# Patient Record
Sex: FEMALE | Race: WHITE | NOT HISPANIC OR LATINO | Employment: OTHER | URBAN - METROPOLITAN AREA
[De-identification: names, ages, dates, MRNs, and addresses within clinical notes are randomized per-mention and may not be internally consistent; named-entity substitution may affect disease eponyms.]

---

## 2017-03-25 DIAGNOSIS — M85.80 OSTEOPENIA: ICD-10-CM

## 2017-03-27 RX ORDER — ALENDRONATE SODIUM 70 MG/1
70 TABLET ORAL
Qty: 12 TABLET | Refills: 0 | Status: SHIPPED | OUTPATIENT
Start: 2017-03-27 | End: 2017-03-27

## 2017-03-27 RX ORDER — ALENDRONATE SODIUM 70 MG/1
70 TABLET ORAL
Qty: 12 TABLET | Refills: 0 | Status: SHIPPED | OUTPATIENT
Start: 2017-03-27 | End: 2017-05-12

## 2017-03-27 NOTE — TELEPHONE ENCOUNTER
alendronate (FOSAMAX) 70 MG       Last Written Prescription Date:  6/6/16  Last Fill Quantity: 12,   # refills: 3  Last Office Visit : 9/15/16  Future Office visit:  none  Creatinine   Date Value Ref Range Status   06/08/2016 0.58 0.52 - 1.04 mg/dL Final

## 2017-05-12 DIAGNOSIS — I10 ESSENTIAL HYPERTENSION WITH GOAL BLOOD PRESSURE LESS THAN 140/90: ICD-10-CM

## 2017-05-12 DIAGNOSIS — M85.80 OSTEOPENIA: ICD-10-CM

## 2017-05-15 RX ORDER — ALENDRONATE SODIUM 70 MG/1
70 TABLET ORAL
Qty: 4 TABLET | Refills: 0 | Status: SHIPPED | OUTPATIENT
Start: 2017-05-15 | End: 2017-05-25

## 2017-05-15 RX ORDER — LOSARTAN POTASSIUM 100 MG/1
100 TABLET ORAL DAILY
Qty: 30 TABLET | Refills: 0 | Status: SHIPPED | OUTPATIENT
Start: 2017-05-15 | End: 2017-05-25

## 2017-05-15 RX ORDER — HYDROCHLOROTHIAZIDE 25 MG/1
25 TABLET ORAL DAILY
Qty: 30 TABLET | Refills: 0 | Status: SHIPPED | OUTPATIENT
Start: 2017-05-15 | End: 2017-05-25

## 2017-05-15 NOTE — TELEPHONE ENCOUNTER
atorvastatin     Last Written Prescription Date:  5/5/16  Last Fill Quantity: 90,   # refills: 3  Last Office Visit : 9/15/16  Future Office visit:  NONE  Routing refill request to provider for review/approval because:  Drug not active on patient's medication list  STOPPED BY PT  6/6/16     losartan       Last Written Prescription Date: 6/6/16  Last Fill Quantity: 90, # refills: 3  Potassium   Date Value Ref Range Status   06/08/2016 3.5 3.4 - 5.3 mmol/L Final     Creatinine   Date Value Ref Range Status   06/08/2016 0.58 0.52 - 1.04 mg/dL Final     BP Readings from Last 3 Encounters:   09/15/16 174/80   09/05/16 156/84   06/06/16 157/79   *LABS DUE 1 MOS RF     HCTZ      Last Written Prescription Date:  6/6/16  Last Fill Quantity: 90,   # refills: 3  K CR & BPs   Results for MAGALYS REGALADO (MRN [id number]) as of 5/15/2017 16:32   Ref. Range 6/8/2016 11:58   Sodium Latest Ref Range: 133 - 144 mmol/L 134   *LABS DUE 1 MOS RF     alendronate       Last Written Prescription Date:  3/27/17   Last Fill Quantity: 12,   # refills: 0  CR  *LABS DUE 1 MOS RF

## 2017-05-18 RX ORDER — ATORVASTATIN CALCIUM 20 MG/1
20 TABLET, FILM COATED ORAL DAILY
Qty: 90 TABLET | Refills: 3 | Status: SHIPPED | OUTPATIENT
Start: 2017-05-18 | End: 2018-05-14

## 2017-05-22 ASSESSMENT — ENCOUNTER SYMPTOMS
NECK PAIN: 0
MUSCLE CRAMPS: 0
MYALGIAS: 0
MUSCLE WEAKNESS: 0
ARTHRALGIAS: 0
JOINT SWELLING: 0
STIFFNESS: 0
BACK PAIN: 1

## 2017-05-22 ASSESSMENT — ACTIVITIES OF DAILY LIVING (ADL)
ARE_THERE_FIREARMS_IN_YOUR_HOME?: N
ARE_THERE_SMOKE_DETECTORS_IN_YOUR_HOME?: Y
DO_MEMBERS_OF_YOUR_HOUSEHOLD_WEAR_SEAT_BELTS?: Y
DO_MEMBERS_OF_YOUR_HOUSEHOLD_USE_SAFETY_HELMETS?: Y
ARE_THERE_CARBON_MONOXIDE_DETECTORS_IN_YOUR_HOME?: Y
DO_MEMBERS_OF_YOUR_HOUSEHOLD_USE_SUNSCREEN?: Y

## 2017-05-25 ENCOUNTER — OFFICE VISIT (OUTPATIENT)
Dept: INTERNAL MEDICINE | Facility: CLINIC | Age: 77
End: 2017-05-25

## 2017-05-25 VITALS
SYSTOLIC BLOOD PRESSURE: 137 MMHG | HEART RATE: 57 BPM | DIASTOLIC BLOOD PRESSURE: 75 MMHG | OXYGEN SATURATION: 96 % | RESPIRATION RATE: 18 BRPM | WEIGHT: 139.9 LBS | BODY MASS INDEX: 25.59 KG/M2

## 2017-05-25 DIAGNOSIS — Z23 PNEUMOCOCCAL VACCINATION ADMINISTERED AT CURRENT VISIT: Primary | ICD-10-CM

## 2017-05-25 DIAGNOSIS — I10 ESSENTIAL HYPERTENSION WITH GOAL BLOOD PRESSURE LESS THAN 140/90: ICD-10-CM

## 2017-05-25 DIAGNOSIS — I10 ESSENTIAL HYPERTENSION: ICD-10-CM

## 2017-05-25 DIAGNOSIS — M85.80 OSTEOPENIA: ICD-10-CM

## 2017-05-25 LAB
ALBUMIN SERPL-MCNC: 4.4 G/DL (ref 3.4–5)
ALP SERPL-CCNC: 52 U/L (ref 40–150)
ALT SERPL W P-5'-P-CCNC: 29 U/L (ref 0–50)
ANION GAP SERPL CALCULATED.3IONS-SCNC: 10 MMOL/L (ref 3–14)
AST SERPL W P-5'-P-CCNC: 24 U/L (ref 0–45)
BILIRUB SERPL-MCNC: 0.7 MG/DL (ref 0.2–1.3)
BUN SERPL-MCNC: 8 MG/DL (ref 7–30)
CALCIUM SERPL-MCNC: 9.2 MG/DL (ref 8.5–10.1)
CHLORIDE SERPL-SCNC: 100 MMOL/L (ref 94–109)
CHOLEST SERPL-MCNC: 211 MG/DL
CO2 SERPL-SCNC: 26 MMOL/L (ref 20–32)
CREAT SERPL-MCNC: 0.46 MG/DL (ref 0.52–1.04)
DEPRECATED CALCIDIOL+CALCIFEROL SERPL-MC: 40 UG/L (ref 20–75)
GFR SERPL CREATININE-BSD FRML MDRD: >90 ML/MIN/1.7M2
GLUCOSE SERPL-MCNC: 94 MG/DL (ref 70–99)
HDLC SERPL-MCNC: 79 MG/DL
LDLC SERPL CALC-MCNC: 111 MG/DL
NONHDLC SERPL-MCNC: 132 MG/DL
POTASSIUM SERPL-SCNC: 3.4 MMOL/L (ref 3.4–5.3)
PROT SERPL-MCNC: 8 G/DL (ref 6.8–8.8)
SODIUM SERPL-SCNC: 136 MMOL/L (ref 133–144)
TRIGL SERPL-MCNC: 105 MG/DL

## 2017-05-25 RX ORDER — LOSARTAN POTASSIUM 100 MG/1
100 TABLET ORAL DAILY
Qty: 90 TABLET | Refills: 3 | Status: SHIPPED | OUTPATIENT
Start: 2017-05-25 | End: 2017-06-28

## 2017-05-25 RX ORDER — HYDROCHLOROTHIAZIDE 25 MG/1
25 TABLET ORAL DAILY
Qty: 90 TABLET | Refills: 3 | Status: SHIPPED | OUTPATIENT
Start: 2017-05-25 | End: 2017-06-28

## 2017-05-25 RX ORDER — ALENDRONATE SODIUM 70 MG/1
70 TABLET ORAL
Qty: 12 TABLET | Refills: 3 | Status: SHIPPED | OUTPATIENT
Start: 2017-05-25 | End: 2017-06-21

## 2017-05-25 RX ORDER — AMLODIPINE BESYLATE 10 MG/1
10 TABLET ORAL DAILY
Qty: 90 TABLET | Refills: 3 | Status: SHIPPED | OUTPATIENT
Start: 2017-05-25 | End: 2017-09-20

## 2017-05-25 ASSESSMENT — ENCOUNTER SYMPTOMS
VOMITING: 0
BRUISES/BLEEDS EASILY: 0
COUGH DISTURBING SLEEP: 0
CONSTIPATION: 0
SEIZURES: 0
BREAST MASS: 0
HYPERTENSION: 0
DISTURBANCES IN COORDINATION: 0
DECREASED LIBIDO: 0
MEMORY LOSS: 0
DYSPNEA ON EXERTION: 0
DIFFICULTY URINATING: 0
HOARSE VOICE: 0
EYE REDNESS: 0
HOT FLASHES: 0
LIGHT-HEADEDNESS: 0
JOINT SWELLING: 0
DECREASED APPETITE: 0
WEAKNESS: 0
HEMATURIA: 0
SLEEP DISTURBANCES DUE TO BREATHING: 0
DIZZINESS: 0
EYE IRRITATION: 0
TREMORS: 0
RESPIRATORY PAIN: 0
BREAST PAIN: 0
POLYDIPSIA: 0
FLANK PAIN: 0
WEIGHT LOSS: 0
HEMOPTYSIS: 0
PANIC: 0
FEVER: 0
DECREASED CONCENTRATION: 0
WEIGHT GAIN: 0
LEG PAIN: 0
INSOMNIA: 0
DOUBLE VISION: 0
JAUNDICE: 0
ORTHOPNEA: 0
NAIL CHANGES: 0
BOWEL INCONTINENCE: 0
SHORTNESS OF BREATH: 0
COUGH: 0
ARTHRALGIAS: 0
TINGLING: 0
PALPITATIONS: 0
SWOLLEN GLANDS: 0
POLYPHAGIA: 0
SINUS CONGESTION: 0
HEADACHES: 0
HYPOTENSION: 0
CHILLS: 0
STIFFNESS: 0
ABDOMINAL PAIN: 0
TACHYCARDIA: 0
BACK PAIN: 1
SINUS PAIN: 0
CLAUDICATION: 0
EXERCISE INTOLERANCE: 0
MYALGIAS: 0
RECTAL BLEEDING: 0
DEPRESSION: 0
NECK PAIN: 0
SORE THROAT: 0
NERVOUS/ANXIOUS: 0
SPEECH CHANGE: 0
WHEEZING: 0
ALTERED TEMPERATURE REGULATION: 0
RECTAL PAIN: 0
SNORES LOUDLY: 0
SMELL DISTURBANCE: 0
BLOOD IN STOOL: 0
HALLUCINATIONS: 0
EXTREMITY NUMBNESS: 0
PARALYSIS: 0
HEARTBURN: 0
SPUTUM PRODUCTION: 0
LOSS OF CONSCIOUSNESS: 0
EYE WATERING: 0
INCREASED ENERGY: 0
NIGHT SWEATS: 0
MUSCLE WEAKNESS: 0
SYNCOPE: 0
NUMBNESS: 0
DIARRHEA: 0
LEG SWELLING: 0
POOR WOUND HEALING: 0
TASTE DISTURBANCE: 0
MUSCLE CRAMPS: 0
SKIN CHANGES: 0
FATIGUE: 0
DYSURIA: 0
NAUSEA: 0
EYE PAIN: 0
NECK MASS: 0
POSTURAL DYSPNEA: 0
TROUBLE SWALLOWING: 0
BLOATING: 0

## 2017-05-25 ASSESSMENT — PAIN SCALES - GENERAL: PAINLEVEL: NO PAIN (0)

## 2017-05-25 NOTE — PROGRESS NOTES
CC: Annual physical exam    HPI:    Janette Rahman is here for a routine physical.  She is feeling well.    HTN: BP is diong good.  120's at home.  No lightheadedness or dizziness.  Small amount of LE edema, but not significant    Back pain: actually having hip pain (just couldn't find a spot to put that ni the questionnaire).  Did PT which helped.  Also sees chiropracter who's her grandson    Gyne:  No spotting    Depression screen:  PHQ-2 Score:     No flowsheet data found.    Tobacco screen:  History   Smoking Status     Never Smoker   Smokeless Tobacco     Never Used     Obesity screen:  Body mass index is 25.59 kg/(m^2).  Exercising regularly    Review of Systems     Constitutional:  Negative for fever, chills, weight loss, weight gain, fatigue, decreased appetite, night sweats, recent stressors, height gain, height loss, post-operative complications, incisional pain, hallucinations, increased energy, hyperactivity and confused.   HENT:  Negative for ear pain, hearing loss, tinnitus, nosebleeds, trouble swallowing, hoarse voice, mouth sores, sore throat, ear discharge, tooth pain, gum tenderness, taste disturbance, smell disturbance, hearing aid, bleeding gums, dry mouth, sinus pain, sinus congestion and neck mass.    Eyes:  Negative for double vision, pain, redness, eye pain, decreased vision, eye watering, eye bulging, eye dryness, flashing lights, spots, floaters, strabismus, tunnel vision, jaundice and eye irritation.   Respiratory:   Negative for cough, hemoptysis, sputum production, shortness of breath, wheezing, sleep disturbances due to breathing, snores loudly, respiratory pain, dyspnea on exertion, cough disturbing sleep and postural dyspnea.    Cardiovascular:  Negative for chest pain, dyspnea on exertion, palpitations, orthopnea, claudication, leg swelling, fingers/toes turn blue, hypertension, hypotension, syncope, history of heart murmur, chest pain on exertion, chest pain at rest, pacemaker,  few scattered varicosities, leg pain, sleep disturbances due to breathing, tachycardia, light-headedness, exercise intolerance and edema.   Gastrointestinal:  Negative for heartburn, nausea, vomiting, abdominal pain, diarrhea, constipation, blood in stool, melena, rectal pain, bloating, hemorrhoids, bowel incontinence, jaundice, rectal bleeding, coffee ground emesis and change in stool.   Genitourinary:  Negative for bladder incontinence, dysuria, urgency, hematuria, flank pain, vaginal discharge, difficulty urinating, genital sores, dyspareunia, decreased libido, nocturia, voiding less frequently, arousal difficulty, abnormal vaginal bleeding, excessive menstruation, menstrual changes, hot flashes, vaginal dryness and postmenopausal bleeding.   Musculoskeletal:  Positive for back pain. Negative for myalgias, joint swelling, arthralgias, stiffness, muscle cramps, neck pain, bone pain, muscle weakness and fracture.   Skin:  Negative for nail changes, itching, poor wound healing, rash, hair changes, skin changes, acne, warts, poor wound healing, scarring, flaky skin, Raynaud's phenomenon, sensitivity to sunlight and skin thickening.   Neurological:  Negative for dizziness, tingling, tremors, speech change, seizures, loss of consciousness, weakness, light-headedness, numbness, headaches, disturbances in coordination, extremity numbness, memory loss, difficulty walking and paralysis.   Endo/Heme:  Negative for anemia, swollen glands and bruises/bleeds easily.   Psychiatric/Behavioral:  Negative for depression, hallucinations, memory loss, decreased concentration, mood swings and panic attacks.    Breast:  Negative for breast discharge, breast mass, breast pain and nipple retraction.   Endocrine:  Negative for altered temperature regulation, polyphagia, polydipsia, unwanted hair growth and change in facial hair.      Medications, allergies, family history, and social history were reviewed and updated in the chart    Past  medical history was reviewed and updated  Patient Active Problem List   Diagnosis     Essential hypertension, benign     HLD (hyperlipidemia)     Inflamed seborrheic keratosis     Osteopenia       OBJECTIVE:  Physical Exam:  /75  Pulse 57  Resp 18  Wt 63.5 kg (139 lb 14.4 oz)  SpO2 96%  Breastfeeding? No  BMI 25.59 kg/m2    GENERAL APPEARANCE: healthy, alert and no distress     EYES: EOMI,     HENT:  mouth without ulcers or lesions     NECK: no adenopathy, no asymmetry, masses, or scars and thyroid normal to palpation     RESP: lungs clear to auscultation - no rales, rhonchi or wheezes     CV: regular rates and rhythm, normal S1 S2, no S3 or S4 and no murmur, click or rub -     ABDOMEN:  soft, nontender, no HSM or masses and bowel sounds normal     MS: extremities normal- no gross deformities noted,    SKIN: no suspicious lesions or rashes     NEURO: mentation intact and speech normal     PSYCH: mentation appears normal. and affect normal/bright     LYMPHATICS: No cervical,  or supraclavicular nodes      ASSESSMENT/PLAN:  # Preventive Care Visit:     Hyperlipidemia:   Have not rechecked lipids since starting lipitor.  WIll check today    Essential hypertension with goal blood pressure less than 140/90  At goal  - losartan (COZAAR) 100 MG tablet  Dispense: 90 tablet; Refill: 3  - hydrochlorothiazide (HYDRODIURIL) 25 MG tablet  Dispense: 90 tablet; Refill: 3  - amLODIPine (NORVASC) 10 MG tablet  Dispense: 90 tablet; Refill: 3    Osteopenia  No fractures  - alendronate (FOSAMAX) 70 MG tablet  Dispense: 12 tablet; Refill: 3  - Vitamin D Deficiency  - Comprehensive metabolic panel    Pneumococcal vaccination administered at current visit  - Pneumococcal vaccine 13 valent PCV13 IM (Prevnar) [00016]    RTC: 1 year      Alyssa Lopez MD

## 2017-05-25 NOTE — MR AVS SNAPSHOT
After Visit Summary   5/25/2017    Janette Rahman    MRN: [id number]           Patient Information     Date Of Birth          1940        Visit Information        Provider Department      5/25/2017 1:30 PM Alyssa Lopez MD [facility]        Today's Diagnoses     Pneumococcal vaccination administered at current visit    -  1    Essential hypertension with goal blood pressure less than 140/90        Essential hypertension        Osteopenia          Care Instructions    Primary Care Center Phone Number [phone]  Primary Care Center Medication Refill Request Information:  * Please contact your pharmacy regarding ANY request for medication refills.  ** [facility] Prescription Fax = [phone]  * Please allow 3 business days for routine medication refills.  * Please allow 5 business days for controlled substance medication refills.     Primary Care Center Test Result notification information:  *You will be notified with in 7-10 days of your appointment day regarding the results of your test.  If you are on MyChart you will be notified as soon as the provider has reviewed the results and signed off on them.                  Follow-ups after your visit        Your next 10 appointments already scheduled     May 25, 2017  1:30 PM CDT   (Arrive by 1:15 PM)   PHYSICAL with Alyssa Lopez MD   [facility] ([facility])    [address] [place] [address] [phone]              Future tests that were ordered for you today     Open Future Orders        Priority Expected Expires Ordered    Lipid Profile Routine 5/25/2017 5/25/2018 5/25/2017    Basic metabolic panel Routine 5/25/2017 6/8/2017 5/25/2017            Who to contact     Please call your clinic at [phone] to:    Ask questions about your health    Make or cancel appointments    Discuss your medicines    Learn about your test  results    Speak to your doctor   If you have compliments or concerns about an experience at your clinic, or if you wish to file a complaint, please contact [facility] Patient Relations at [phone] or email us at [email or web address]         Additional Information About Your Visit        Viewdlehart Information     Movablet gives you secure access to your electronic health record. If you see a primary care provider, you can also send messages to your care team and make appointments. If you have questions, please call your primary care clinic.  If you do not have a primary care provider, please call [phone] and they will assist you.      [employer] is an electronic gateway that provides easy, online access to your medical records. With [employer], you can request a clinic appointment, read your test results, renew a prescription or communicate with your care team.     To access your existing account, please contact your [facility] or call [phone] for assistance.        Care EveryWhere ID     This is your Care EveryWhere ID. This could be used by other organizations to access your [facility] medical records  [id number]        Your Vitals Were     Pulse Respirations Pulse Oximetry Breastfeeding? BMI (Body Mass Index)       57 18 96% No 25.59 kg/m2        Blood Pressure from Last 3 Encounters:   05/25/17 137/75   09/15/16 174/80   09/05/16 156/84    Weight from Last 3 Encounters:   05/25/17 63.5 kg (139 lb 14.4 oz)   09/15/16 61 kg (134 lb 8 oz)   09/05/16 60.7 kg (133 lb 12.8 oz)              We Performed the Following     Pneumococcal vaccine 13 valent PCV13 IM (Prevnar) [13341]          Today's Medication Changes          These changes are accurate as of: 5/25/17  1:02 PM.  If you have any questions, ask your nurse or doctor.               These medicines have changed or have updated prescriptions.        Dose/Directions    alendronate 70 MG  tablet   Commonly known as:  FOSAMAX   This may have changed:  additional instructions   Used for:  Osteopenia   Changed by:  Alyssa Lopez MD        Dose:  70 mg   Take 1 tablet (70 mg) by mouth every 7 days   Quantity:  12 tablet   Refills:  3            Where to get your medicines      These medications were sent to [facility] [place] [address] [place] [address]  [address] [place] [address], [place] [address]     Phone:  [phone]     alendronate 70 MG tablet    amLODIPine 10 MG tablet    hydrochlorothiazide 25 MG tablet    losartan 100 MG tablet                Primary Care Provider Office Phone # Fax #    Alsysa Lopez [phone]       [address] [place] [address]        Thank you!     Thank you for choosing [facility]  for your care. Our goal is always to provide you with excellent care. Hearing back from our patients is one way we can continue to improve our services. Please take a few minutes to complete the written survey that you may receive in the mail after your visit with us. Thank you!             Your Updated Medication List - Protect others around you: Learn how to safely use, store and throw away your medicines at www.disposemymeds.org.          This list is accurate as of: 5/25/17  1:02 PM.  Always use your most recent med list.                   Brand Name Dispense Instructions for use    alendronate 70 MG tablet    FOSAMAX    12 tablet    Take 1 tablet (70 mg) by mouth every 7 days       amLODIPine 10 MG tablet    NORVASC    90 tablet    Take 1 tablet (10 mg) by mouth daily       atorvastatin 20 MG tablet    LIPITOR    90 tablet    Take 1 tablet (20 mg) by mouth daily       fluticasone 50 MCG/ACT spray    FLONASE    3 Bottle    Spray 1-2 sprays into both nostrils daily       hydrochlorothiazide 25 MG tablet    HYDRODIURIL    90 tablet    Take 1 tablet (25 mg)  by mouth daily LABS DUE       losartan 100 MG tablet    COZAAR    90 tablet    Take 1 tablet (100 mg) by mouth daily LABS DUE       meclizine 25 MG tablet    ANTIVERT    30 tablet    Take 1 tablet (25 mg) by mouth every 6 hours as needed for dizziness

## 2017-05-25 NOTE — NURSING NOTE
Chief Complaint   Patient presents with     Physical     pt is here for an annual physical       Venessa Ray CMA at 12:42 PM on 5/25/2017

## 2017-05-25 NOTE — PATIENT INSTRUCTIONS
Primary Care Center Phone Number [phone]  Primary Care Center Medication Refill Request Information:  * Please contact your pharmacy regarding ANY request for medication refills.  ** [facility] Prescription Fax = [phone]  * Please allow 3 business days for routine medication refills.  * Please allow 5 business days for controlled substance medication refills.     Primary Care Center Test Result notification information:  *You will be notified with in 7-10 days of your appointment day regarding the results of your test.  If you are on MyChart you will be notified as soon as the provider has reviewed the results and signed off on them.

## 2017-06-15 DIAGNOSIS — M85.80 OSTEOPENIA: ICD-10-CM

## 2017-06-19 RX ORDER — ALENDRONATE SODIUM 70 MG/1
TABLET ORAL
Qty: 4 TABLET | Refills: 0 | OUTPATIENT
Start: 2017-06-19

## 2017-06-19 NOTE — TELEPHONE ENCOUNTER
Call was placed to patient to clarify where to refill her Fosamax as there was a refill request from a different pharmacy than the last order was sent to . The patient states the is no longer taking the medication.  Pharmacy was notified.  It will be removed from the medication list and an FYI will be sent to the provider.    Kathleen M Doege RN

## 2017-06-20 NOTE — TELEPHONE ENCOUNTER
At my recent visit with her she was taking this medication list -- can you clarify why she stopped?    June 21, 2017 10:39 AM  Spoke with patient. She had confused Fosamax and Flonase. She does take the Fosamax. Prescription sent to her mail order pharmacy.   Marlen Rashid RN, Care Coordinator

## 2017-06-21 RX ORDER — ALENDRONATE SODIUM 70 MG/1
70 TABLET ORAL
Qty: 12 TABLET | Refills: 3 | Status: SHIPPED | OUTPATIENT
Start: 2017-06-21 | End: 2018-06-08

## 2017-06-28 DIAGNOSIS — I10 ESSENTIAL HYPERTENSION WITH GOAL BLOOD PRESSURE LESS THAN 140/90: ICD-10-CM

## 2017-06-29 RX ORDER — LOSARTAN POTASSIUM 100 MG/1
100 TABLET ORAL DAILY
Qty: 90 TABLET | Refills: 2 | Status: SHIPPED | OUTPATIENT
Start: 2017-06-29 | End: 2018-03-11

## 2017-06-29 RX ORDER — HYDROCHLOROTHIAZIDE 25 MG/1
25 TABLET ORAL DAILY
Qty: 90 TABLET | Refills: 2 | Status: SHIPPED | OUTPATIENT
Start: 2017-06-29 | End: 2018-03-11

## 2017-09-20 DIAGNOSIS — I10 ESSENTIAL HYPERTENSION: ICD-10-CM

## 2017-09-22 RX ORDER — AMLODIPINE BESYLATE 10 MG/1
10 TABLET ORAL DAILY
Qty: 90 TABLET | Refills: 1 | Status: SHIPPED | OUTPATIENT
Start: 2017-09-22 | End: 2018-03-11

## 2018-02-19 DIAGNOSIS — M85.80 OSTEOPENIA: ICD-10-CM

## 2018-02-19 DIAGNOSIS — I10 ESSENTIAL HYPERTENSION WITH GOAL BLOOD PRESSURE LESS THAN 140/90: ICD-10-CM

## 2018-02-19 RX ORDER — ATORVASTATIN CALCIUM 20 MG/1
TABLET, FILM COATED ORAL
Qty: 90 TABLET | Refills: 3 | OUTPATIENT
Start: 2018-02-19

## 2018-03-11 DIAGNOSIS — I10 ESSENTIAL HYPERTENSION: ICD-10-CM

## 2018-03-11 DIAGNOSIS — I10 ESSENTIAL HYPERTENSION WITH GOAL BLOOD PRESSURE LESS THAN 140/90: ICD-10-CM

## 2018-03-13 NOTE — TELEPHONE ENCOUNTER
amLODIPine (NORVASC) 10 MG  Last Written Prescription Date:  9/22/17  Last Fill Quantity: 90,   # refills: 1  Last Office Visit : 5/25/17  Future Office visit:  NONE    Routing refill request for review/approval because:  REVIEW LABS    Y DAY

## 2018-03-13 NOTE — TELEPHONE ENCOUNTER
HYDRODIURIL  25MG     Last Written Prescription Date:  6/29/17  Last Fill Quantity: 90,   # refills: 2  Last Office Visit : 5/25/17  Future Office visit:  NONE    losartan (COZAAR) 100 MG   Last Written Prescription Date:  6/29/17  Last Fill Quantity: 90,   # refills: 2     **Routing refill request to provider for review/approval because:  REVIEW LAB/ CR

## 2018-03-14 RX ORDER — LOSARTAN POTASSIUM 100 MG/1
100 TABLET ORAL DAILY
Qty: 90 TABLET | Refills: 0 | Status: SHIPPED | OUTPATIENT
Start: 2018-03-14 | End: 2018-05-14

## 2018-03-14 RX ORDER — HYDROCHLOROTHIAZIDE 25 MG/1
25 TABLET ORAL DAILY
Qty: 90 TABLET | Refills: 0 | Status: SHIPPED | OUTPATIENT
Start: 2018-03-14 | End: 2018-05-14

## 2018-03-14 RX ORDER — AMLODIPINE BESYLATE 10 MG/1
10 TABLET ORAL DAILY
Qty: 90 TABLET | Refills: 0 | Status: SHIPPED | OUTPATIENT
Start: 2018-03-14 | End: 2018-05-14

## 2018-03-14 NOTE — TELEPHONE ENCOUNTER
BP Readings from Last 3 Encounters:   05/25/17 137/75   09/15/16 174/80   09/05/16 156/84     Creatinine   Date Value Ref Range Status   05/25/2017 0.46 (L) 0.52 - 1.04 mg/dL Final     Potassium   Date Value Ref Range Status   05/25/2017 3.4 3.4 - 5.3 mmol/L Final

## 2018-03-24 DIAGNOSIS — M85.80 OSTEOPENIA: ICD-10-CM

## 2018-03-26 RX ORDER — ALENDRONATE SODIUM 70 MG/1
TABLET ORAL
Qty: 12 TABLET | Refills: 3 | OUTPATIENT
Start: 2018-03-26

## 2018-05-14 DIAGNOSIS — I10 ESSENTIAL HYPERTENSION: ICD-10-CM

## 2018-05-14 DIAGNOSIS — I10 ESSENTIAL HYPERTENSION WITH GOAL BLOOD PRESSURE LESS THAN 140/90: ICD-10-CM

## 2018-05-14 RX ORDER — ATORVASTATIN CALCIUM 20 MG/1
20 TABLET, FILM COATED ORAL DAILY
Qty: 90 TABLET | Refills: 0 | Status: SHIPPED | OUTPATIENT
Start: 2018-05-14 | End: 2018-06-08

## 2018-05-14 NOTE — TELEPHONE ENCOUNTER
Hctz,cozaar,norvasc   Last Written Prescription Date:  All filled 3/14/18  Last Fill Quantity: 90,   # refills: 0  Last Office Visit : 5/25/17  Future Office visit:  No future appt    Routing refill request to nurse for review/approval because:  Failed protocol  Scheduling has been notified to contact the pt for appointment.

## 2018-05-17 DIAGNOSIS — I10 ESSENTIAL HYPERTENSION WITH GOAL BLOOD PRESSURE LESS THAN 140/90: ICD-10-CM

## 2018-05-17 DIAGNOSIS — I10 ESSENTIAL HYPERTENSION: ICD-10-CM

## 2018-05-17 RX ORDER — AMLODIPINE BESYLATE 10 MG/1
10 TABLET ORAL DAILY
Qty: 90 TABLET | Refills: 0 | Status: SHIPPED | OUTPATIENT
Start: 2018-05-17 | End: 2018-06-08

## 2018-05-17 RX ORDER — LOSARTAN POTASSIUM 100 MG/1
100 TABLET ORAL DAILY
Qty: 90 TABLET | Refills: 0 | Status: SHIPPED | OUTPATIENT
Start: 2018-05-17 | End: 2018-06-08

## 2018-05-17 RX ORDER — HYDROCHLOROTHIAZIDE 25 MG/1
25 TABLET ORAL DAILY
Qty: 90 TABLET | Refills: 0 | Status: SHIPPED | OUTPATIENT
Start: 2018-05-17 | End: 2018-06-08

## 2018-05-21 RX ORDER — HYDROCHLOROTHIAZIDE 25 MG/1
TABLET ORAL
Qty: 90 TABLET | Refills: 0 | OUTPATIENT
Start: 2018-05-21

## 2018-05-21 RX ORDER — AMLODIPINE BESYLATE 10 MG/1
TABLET ORAL
Qty: 90 TABLET | Refills: 0 | OUTPATIENT
Start: 2018-05-21

## 2018-05-21 RX ORDER — LOSARTAN POTASSIUM 100 MG/1
TABLET ORAL
Qty: 90 TABLET | Refills: 0 | OUTPATIENT
Start: 2018-05-21

## 2018-05-26 ASSESSMENT — ACTIVITIES OF DAILY LIVING (ADL)
IN_THE_PAST_7_DAYS,_DID_YOU_NEED_HELP_FROM_OTHERS_TO_TAKE_CARE_OF_THINGS_SUCH_AS_LAUNDRY_AND_HOUSEKEEPING,_BANKING,_SHOPPING,_USING_THE_TELEPHONE,_FOOD_PREPARATION,_TRANSPORTATION,_OR_TAKING_YOUR_OWN_MEDICATIONS?: N
IN_THE_PAST_7_DAYS,_DID_YOU_NEED_HELP_FROM_OTHERS_TO_PERFORM_EVERYDAY_ACTIVITIES_SUCH_AS_EATING,_GETTING_DRESSED,_GROOMING,_BATHING,_WALKING,_OR_USING_THE_TOILET: N

## 2018-05-26 ASSESSMENT — ENCOUNTER SYMPTOMS
MUSCLE CRAMPS: 0
BACK PAIN: 0
NECK PAIN: 1
ARTHRALGIAS: 1
MUSCLE WEAKNESS: 0
MYALGIAS: 0
JOINT SWELLING: 1
STIFFNESS: 1

## 2018-06-08 ENCOUNTER — OFFICE VISIT (OUTPATIENT)
Dept: INTERNAL MEDICINE | Facility: CLINIC | Age: 78
End: 2018-06-08
Payer: COMMERCIAL

## 2018-06-08 ENCOUNTER — RADIANT APPOINTMENT (OUTPATIENT)
Dept: MAMMOGRAPHY | Facility: CLINIC | Age: 78
End: 2018-06-08
Attending: INTERNAL MEDICINE
Payer: COMMERCIAL

## 2018-06-08 VITALS
BODY MASS INDEX: 26.39 KG/M2 | OXYGEN SATURATION: 98 % | DIASTOLIC BLOOD PRESSURE: 66 MMHG | HEART RATE: 57 BPM | SYSTOLIC BLOOD PRESSURE: 115 MMHG | HEIGHT: 61 IN | WEIGHT: 139.8 LBS

## 2018-06-08 DIAGNOSIS — M85.80 OSTEOPENIA, UNSPECIFIED LOCATION: ICD-10-CM

## 2018-06-08 DIAGNOSIS — Z12.31 ENCOUNTER FOR SCREENING MAMMOGRAM FOR BREAST CANCER: ICD-10-CM

## 2018-06-08 DIAGNOSIS — Z23 NEED FOR PROPHYLACTIC VACCINATION WITH TETANUS-DIPHTHERIA (TD): Primary | ICD-10-CM

## 2018-06-08 DIAGNOSIS — I10 ESSENTIAL HYPERTENSION WITH GOAL BLOOD PRESSURE LESS THAN 140/90: ICD-10-CM

## 2018-06-08 DIAGNOSIS — I10 ESSENTIAL HYPERTENSION: ICD-10-CM

## 2018-06-08 DIAGNOSIS — E78.5 HYPERLIPIDEMIA LDL GOAL <100: ICD-10-CM

## 2018-06-08 LAB
ANION GAP SERPL CALCULATED.3IONS-SCNC: 8 MMOL/L (ref 3–14)
BUN SERPL-MCNC: 7 MG/DL (ref 7–30)
CALCIUM SERPL-MCNC: 9.1 MG/DL (ref 8.5–10.1)
CHLORIDE SERPL-SCNC: 99 MMOL/L (ref 94–109)
CHOLEST SERPL-MCNC: 201 MG/DL
CO2 SERPL-SCNC: 28 MMOL/L (ref 20–32)
CREAT SERPL-MCNC: 0.6 MG/DL (ref 0.52–1.04)
GFR SERPL CREATININE-BSD FRML MDRD: >90 ML/MIN/1.7M2
GLUCOSE SERPL-MCNC: 90 MG/DL (ref 70–99)
HDLC SERPL-MCNC: 70 MG/DL
LDLC SERPL CALC-MCNC: 119 MG/DL
NONHDLC SERPL-MCNC: 132 MG/DL
POTASSIUM SERPL-SCNC: 3 MMOL/L (ref 3.4–5.3)
SODIUM SERPL-SCNC: 134 MMOL/L (ref 133–144)
TRIGL SERPL-MCNC: 65 MG/DL

## 2018-06-08 RX ORDER — AMLODIPINE BESYLATE 10 MG/1
10 TABLET ORAL DAILY
Qty: 90 TABLET | Refills: 0 | Status: SHIPPED | OUTPATIENT
Start: 2018-06-08 | End: 2018-09-17

## 2018-06-08 RX ORDER — ZOLEDRONIC ACID 5 MG/100ML
5 INJECTION, SOLUTION INTRAVENOUS ONCE
Status: CANCELLED
Start: 2018-06-08 | End: 2018-06-08

## 2018-06-08 RX ORDER — HYDROCHLOROTHIAZIDE 25 MG/1
25 TABLET ORAL DAILY
Qty: 90 TABLET | Refills: 0 | Status: SHIPPED | OUTPATIENT
Start: 2018-06-08 | End: 2018-09-17

## 2018-06-08 RX ORDER — ATORVASTATIN CALCIUM 20 MG/1
20 TABLET, FILM COATED ORAL DAILY
Qty: 90 TABLET | Refills: 0 | Status: SHIPPED | OUTPATIENT
Start: 2018-06-08 | End: 2018-09-17

## 2018-06-08 RX ORDER — LOSARTAN POTASSIUM 100 MG/1
100 TABLET ORAL DAILY
Qty: 90 TABLET | Refills: 0 | Status: SHIPPED | OUTPATIENT
Start: 2018-06-08 | End: 2018-09-17

## 2018-06-08 ASSESSMENT — ENCOUNTER SYMPTOMS
TROUBLE SWALLOWING: 0
NIGHT SWEATS: 0
DEPRESSION: 0
BACK PAIN: 0
BLOATING: 0
SNORES LOUDLY: 0
WEIGHT LOSS: 0
CHILLS: 0
LEG SWELLING: 0
EXTREMITY NUMBNESS: 0
COUGH: 0
POOR WOUND HEALING: 0
BREAST MASS: 0
BREAST PAIN: 0
FATIGUE: 0
HOT FLASHES: 0
DISTURBANCES IN COORDINATION: 0
SPUTUM PRODUCTION: 0
DIZZINESS: 0
NAUSEA: 0
HEMOPTYSIS: 0
NERVOUS/ANXIOUS: 0
SHORTNESS OF BREATH: 0
DYSPNEA ON EXERTION: 0
MYALGIAS: 0
ABDOMINAL PAIN: 0
INCREASED ENERGY: 0
HOARSE VOICE: 0
SEIZURES: 0
WEAKNESS: 0
DECREASED LIBIDO: 0
ARTHRALGIAS: 1
SMELL DISTURBANCE: 0
LOSS OF CONSCIOUSNESS: 0
HEADACHES: 0
POLYPHAGIA: 0
JOINT SWELLING: 1
EYE IRRITATION: 0
MUSCLE CRAMPS: 0
PANIC: 0
EYE REDNESS: 0
TINGLING: 0
FEVER: 0
TASTE DISTURBANCE: 0
SORE THROAT: 0
HALLUCINATIONS: 0
WEIGHT GAIN: 0
NAIL CHANGES: 0
RECTAL PAIN: 0
SLEEP DISTURBANCES DUE TO BREATHING: 0
COUGH DISTURBING SLEEP: 0
BOWEL INCONTINENCE: 0
CONSTIPATION: 0
HEMATURIA: 0
MUSCLE WEAKNESS: 0
HYPERTENSION: 0
NECK PAIN: 1
PARALYSIS: 0
NECK MASS: 0
EYE WATERING: 0
LEG PAIN: 0
ORTHOPNEA: 0
DECREASED CONCENTRATION: 0
POSTURAL DYSPNEA: 0
JAUNDICE: 0
BLOOD IN STOOL: 0
PALPITATIONS: 0
LIGHT-HEADEDNESS: 0
RESPIRATORY PAIN: 0
DOUBLE VISION: 0
CLAUDICATION: 0
HYPOTENSION: 0
MEMORY LOSS: 0
RECTAL BLEEDING: 0
POLYDIPSIA: 0
SINUS CONGESTION: 0
DECREASED APPETITE: 0
SWOLLEN GLANDS: 0
HEARTBURN: 0
DIFFICULTY URINATING: 0
SPEECH CHANGE: 0
BRUISES/BLEEDS EASILY: 0
FLANK PAIN: 0
SKIN CHANGES: 0
DIARRHEA: 0
ALTERED TEMPERATURE REGULATION: 0
SINUS PAIN: 0
VOMITING: 0
WHEEZING: 0
STIFFNESS: 1
INSOMNIA: 0
NUMBNESS: 0
EXERCISE INTOLERANCE: 0
DYSURIA: 0
EYE PAIN: 0
TACHYCARDIA: 0
SYNCOPE: 0
TREMORS: 0

## 2018-06-08 ASSESSMENT — PAIN SCALES - GENERAL: PAINLEVEL: NO PAIN (0)

## 2018-06-08 NOTE — NURSING NOTE
Chief Complaint   Patient presents with     Physical     Pt is here for annual physical.      Tiffany Colin LPN at 10:31 AM on 6/8/2018.

## 2018-06-08 NOTE — MR AVS SNAPSHOT
After Visit Summary   6/8/2018    Janette Rahman    MRN: [id number]           Patient Information     Date Of Birth          1940        Visit Information        Provider Department      6/8/2018 10:30 AM Alyssa Lopez MD [facility]        Today's Diagnoses     Need for prophylactic vaccination with tetanus-diphtheria (TD)    -  1    Essential hypertension with goal blood pressure less than 140/90        Essential hypertension        Hyperlipidemia LDL goal <100        Osteopenia, unspecified location        Encounter for screening mammogram for breast cancer          Care Instructions    Primary Care Center: [phone]     Primary Care Center Medication Refill Request Information:  * Please contact your pharmacy regarding ANY request for medication refills.  ** [facility] Prescription Fax = [phone]  * Please allow 3 business days for routine medication refills.  * Please allow 5 business days for controlled substance medication refills.     Primary Care Center Test Result notification information:  *You will be notified with in 7-10 days of your appointment day regarding the results of your test.  If you are on MyChart you will be notified as soon as the provider has reviewed the results and signed off on them.      Infusion Center [phone] option # 7          Follow-ups after your visit        Your next 10 appointments already scheduled     Jun 14, 2018 10:00 AM CDT   Infusion 60 with UC SPEC INFUSION, UC 51 ATC   [facility] ([facility])    [address]  [address]  [facility] [address] [phone]              Who to contact     Please call your clinic at [phone] to:    Ask questions about your health    Make or cancel appointments    Discuss your medicines    Learn about your test results    Speak to your doctor            Additional Information About Your Visit       "  MyChart Information     [employer] gives you secure access to your electronic health record. If you see a primary care provider, you can also send messages to your care team and make appointments. If you have questions, please call your primary care clinic.  If you do not have a primary care provider, please call [phone] and they will assist you.      [employer] is an electronic gateway that provides easy, online access to your medical records. With [employer], you can request a clinic appointment, read your test results, renew a prescription or communicate with your care team.     To access your existing account, please contact your [facility] or call [phone] for assistance.        Care EveryWhere ID     This is your Care EveryWhere ID. This could be used by other organizations to access your [facility] medical records  [id number]        Your Vitals Were     Pulse Height Pulse Oximetry Breastfeeding? BMI (Body Mass Index)       57 1.555 m (5' 1.22\") 98% No 26.23 kg/m2        Blood Pressure from Last 3 Encounters:   06/08/18 115/66   05/25/17 137/75   09/15/16 174/80    Weight from Last 3 Encounters:   06/08/18 63.4 kg (139 lb 12.8 oz)   05/25/17 63.5 kg (139 lb 14.4 oz)   09/15/16 61 kg (134 lb 8 oz)              We Performed the Following     TDAP ( BOOSTRIX AGES 10-64)          Today's Medication Changes          These changes are accurate as of 6/8/18  1:31 PM.  If you have any questions, ask your nurse or doctor.               Stop taking these medicines if you haven't already. Please contact your care team if you have questions.     alendronate 70 MG tablet   Commonly known as:  FOSAMAX   Stopped by:  Alyssa Lopez MD                Where to get your medicines      These medications were sent to [facility] - [place], [place] - [address]   [address], [facility] [address]     Phone:  [phone]     amLODIPine 10 MG tablet    " atorvastatin 20 MG tablet    hydrochlorothiazide 25 MG tablet    losartan 100 MG tablet                Primary Care Provider Office Phone # Fax #    Alyssa Mohini Lopez [phone]       [address] [place] [address] [address] [place] [address]        Equal Access to Services     LYNNERAJESH BROCK : Hadii deisy ku hadseemao Soomaali, waaxda luqadaha, qaybta kaalmada adeegyada, waxloyda butlerin hayashn adecourtney rice catherine maravilla. So [facility] [phone].    ATENCIÓN: Si habla español, tiene a alfonso disposición servicios gratuitos de asistencia lingüística. Llame al [phone].    We comply with applicable federal civil rights laws and [place] laws. We do not discriminate on the basis of race, color, national origin, age, disability, sex, sexual orientation, or gender identity.            Thank you!     Thank you for choosing [facility]  for your care. Our goal is always to provide you with excellent care. Hearing back from our patients is one way we can continue to improve our services. Please take a few minutes to complete the written survey that you may receive in the mail after your visit with us. Thank you!             Your Updated Medication List - Protect others around you: Learn how to safely use, store and throw away your medicines at www.disposemymeds.org.          This list is accurate as of 6/8/18  1:31 PM.  Always use your most recent med list.                   Brand Name Dispense Instructions for use Diagnosis    amLODIPine 10 MG tablet    NORVASC    90 tablet    Take 1 tablet (10 mg) by mouth daily    Essential hypertension with goal blood pressure less than 140/90       atorvastatin 20 MG tablet    LIPITOR    90 tablet    Take 1 tablet (20 mg) by mouth daily    Need for prophylactic vaccination with tetanus-diphtheria (TD)       COQ-10 PO      Take by mouth daily        fluticasone 50 MCG/ACT spray    FLONASE    3 Bottle    Spray 1-2 sprays into both nostrils daily    Post-nasal drainage        hydrochlorothiazide 25 MG tablet    HYDRODIURIL    90 tablet    Take 1 tablet (25 mg) by mouth daily    Essential hypertension with goal blood pressure less than 140/90       losartan 100 MG tablet    COZAAR    90 tablet    Take 1 tablet (100 mg) by mouth daily    Essential hypertension with goal blood pressure less than 140/90       meclizine 25 MG tablet    ANTIVERT    30 tablet    Take 1 tablet (25 mg) by mouth every 6 hours as needed for dizziness    Dizziness       OMEGA 3 PO      Take by mouth daily        PROBIOTIC DAILY PO      Take by mouth daily

## 2018-06-08 NOTE — PROGRESS NOTES
Rooming Note  Health Maintenance   Health Maintenance Due   Topic Date Due     ADVANCE DIRECTIVE PLANNING Q5 YRS  12/07/1997     TETANUS IMMUNIZATION (SYSTEM ASSIGNED)  09/19/2017     BMP Q1 YR  05/25/2018     LIPID MONITORING Q1 YEAR  05/25/2018     FALL RISK ASSESSMENT  05/25/2018    Health maintenance items discussed and ordered/forwarded to PCP  Blood Pressure   BP Readings from Last 1 Encounters:   06/08/18 115/66    Single BP recheck started, 10:57 AM (4 minutes)      Fall Risk  FALL RISK ASSESSMENT 6/8/2018   Fallen 2 or more times in the past year? No   Any fall with injury in the past year? No     Tiffany Colin LPN at 10:57 AM on 6/8/2018.

## 2018-06-08 NOTE — PROGRESS NOTES
CC: Annual physical exam    HPI:    Janette Rahman is here for a routine physical.  She is overall doing well.  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  She has some support systems, so her mood is currently doing well.    She has ongoing trouble with rectal discharge and incontinence.  Colonoscopy in 2016 was unremarkable.  She is trying probiotics which has been helpful.  She had a banana which made it worse.  She has to wear pads because of the issue    She does think the incontinence coincides with the fosamax, which does have listed symptoms of diarrhea and flatulence.    Gyne: no concerns  Depression screen:  PHQ-2 Score:     No flowsheet data found.    Tobacco screen:  History   Smoking Status     Never Smoker   Smokeless Tobacco     Never Used     Obesity screen:  Body mass index is 26.23 kg/(m^2).   exercising regularly.      Review of Systems     Constitutional:  Negative for fever, chills, weight loss, weight gain, fatigue, decreased appetite, night sweats, recent stressors, height gain, height loss, post-operative complications, incisional pain, hallucinations, increased energy, hyperactivity and confused.   HENT:  Negative for ear pain, hearing loss, tinnitus, nosebleeds, trouble swallowing, hoarse voice, mouth sores, sore throat, ear discharge, tooth pain, gum tenderness, taste disturbance, smell disturbance, hearing aid, bleeding gums, dry mouth, sinus pain, sinus congestion and neck mass.    Eyes:  Negative for double vision, pain, redness, eye pain, decreased vision, eye watering, eye bulging, eye dryness, flashing lights, spots, floaters, strabismus, tunnel vision, jaundice and eye irritation.   Respiratory:   Negative for cough, hemoptysis, sputum production, shortness of breath, wheezing, sleep disturbances due to breathing, snores loudly, respiratory pain, dyspnea on exertion, cough disturbing sleep and postural dyspnea.     Cardiovascular:  Negative for chest pain, dyspnea on exertion, palpitations, orthopnea, claudication, leg swelling, fingers/toes turn blue, hypertension, hypotension, syncope, history of heart murmur, chest pain on exertion, chest pain at rest, pacemaker, few scattered varicosities, leg pain, sleep disturbances due to breathing, tachycardia, light-headedness, exercise intolerance and edema.   Gastrointestinal:  Negative for heartburn, nausea, vomiting, abdominal pain, diarrhea, constipation, blood in stool, melena, rectal pain, bloating, hemorrhoids, bowel incontinence, jaundice, rectal bleeding, coffee ground emesis and change in stool.   Genitourinary:  Negative for bladder incontinence, dysuria, urgency, hematuria, flank pain, vaginal discharge, difficulty urinating, genital sores, dyspareunia, decreased libido, nocturia, voiding less frequently, arousal difficulty, abnormal vaginal bleeding, excessive menstruation, menstrual changes, hot flashes, vaginal dryness and postmenopausal bleeding.   Musculoskeletal:  Positive for joint swelling, arthralgias, stiffness and neck pain. Negative for myalgias, back pain, muscle cramps, bone pain, muscle weakness and fracture.   Skin:  Negative for nail changes, itching, poor wound healing, rash, hair changes, skin changes, acne, warts, poor wound healing, scarring, flaky skin, Raynaud's phenomenon, sensitivity to sunlight and skin thickening.   Neurological:  Negative for dizziness, tingling, tremors, speech change, seizures, loss of consciousness, weakness, light-headedness, numbness, headaches, disturbances in coordination, extremity numbness, memory loss, difficulty walking and paralysis.   Endo/Heme:  Negative for anemia, swollen glands and bruises/bleeds easily.   Psychiatric/Behavioral:  Negative for depression, hallucinations, memory loss, decreased concentration, mood swings and panic attacks.    Breast:  Negative for breast discharge, breast mass, breast pain and  "nipple retraction.   Endocrine:  Negative for altered temperature regulation, polyphagia, polydipsia, unwanted hair growth and change in facial hair.      Medications, allergies, family history, and social history were reviewed and updated in the chart    Past medical history was reviewed and updated  Patient Active Problem List   Diagnosis     Essential hypertension, benign     HLD (hyperlipidemia)     Inflamed seborrheic keratosis     Osteopenia       OBJECTIVE:  Physical Exam:  /66  Pulse 57  Ht 1.555 m (5' 1.22\")  Wt 63.4 kg (139 lb 12.8 oz)  SpO2 98%  Breastfeeding? No  BMI 26.23 kg/m2    GENERAL APPEARANCE: healthy, alert and no distress     EYES: EOMI, fundi benign- PERRL     HENT: ear canals and TM's normal and nose and mouth without ulcers or lesions     NECK: no adenopathy, no asymmetry, masses, or scars and thyroid normal to palpation     RESP: lungs clear to auscultation - no rales, rhonchi or wheezes     CV: regular rates and rhythm, normal S1 S2, no S3 or S4 and no murmur, click or rub -     ABDOMEN:  soft, nontender, no HSM or masses and bowel sounds normal     MS: extremities normal- no gross deformities noted     SKIN: no suspicious lesions or rashes     NEURO:  mentation intact and speech normal     PSYCH: mentation appears normal. and affect normal/bright     LYMPHATICS: No cervical, or supraclavicular nodes      ASSESSMENT/PLAN:  # Preventive Care Visit:     Need for prophylactic vaccination with tetanus-diphtheria (TD)  - TDAP ( BOOSTRIX AGES 10-64)  - atorvastatin (LIPITOR) 20 MG tablet  Dispense: 90 tablet; Refill: 0    Essential hypertension with goal blood pressure less than 140/90  - Lipid panel reflex to direct LDL Fasting  - losartan (COZAAR) 100 MG tablet  Dispense: 90 tablet; Refill: 0  - hydrochlorothiazide (HYDRODIURIL) 25 MG tablet  Dispense: 90 tablet; Refill: 0  - amLODIPine (NORVASC) 10 MG tablet  Dispense: 90 tablet; Refill: 0  - BASIC METABOLIC PANEL " (Today)    Rectal incontinence:  She is concerned about possible weakness of the rectal muscles.  WIll do longer trial of probiotics.  If ongoing symptoms consider PT referral    Osteopenia, unspecified location  - Vitamin D Deficiency  - d/c fosamax, start reclast.    Encounter for screening mammogram for breast cancer  mammogram ordered      RTC: del Lopez MD

## 2018-06-08 NOTE — PATIENT INSTRUCTIONS
[facility]: [phone]     [facility] Medication Refill Request Information:  * Please contact your pharmacy regarding ANY request for medication refills.  ** [facility] Prescription Fax = [phone]  * Please allow 3 business days for routine medication refills.  * Please allow 5 business days for controlled substance medication refills.     [facility] Test Result notification information:  *You will be notified with in 7-10 days of your appointment day regarding the results of your test.  If you are on MyChart you will be notified as soon as the provider has reviewed the results and signed off on them.      [facility] [phone] option # 7

## 2018-06-08 NOTE — NURSING NOTE
Patient received TDAP vaccine.  See immunization list for administration details.  Patient tolerated injection well.     Tiffany Phipps at 11:10 AM on 6/8/2018.

## 2018-06-11 LAB — DEPRECATED CALCIDIOL+CALCIFEROL SERPL-MC: 30 UG/L (ref 20–75)

## 2018-06-14 ENCOUNTER — INFUSION THERAPY VISIT (OUTPATIENT)
Dept: INFUSION THERAPY | Facility: CLINIC | Age: 78
End: 2018-06-14
Attending: INTERNAL MEDICINE
Payer: MEDICARE

## 2018-06-14 VITALS
HEART RATE: 57 BPM | DIASTOLIC BLOOD PRESSURE: 53 MMHG | SYSTOLIC BLOOD PRESSURE: 132 MMHG | OXYGEN SATURATION: 98 % | TEMPERATURE: 97.3 F | RESPIRATION RATE: 16 BRPM

## 2018-06-14 DIAGNOSIS — M85.89 OSTEOPENIA OF MULTIPLE SITES: Primary | ICD-10-CM

## 2018-06-14 PROCEDURE — 25000128 H RX IP 250 OP 636: Mod: ZF | Performed by: INTERNAL MEDICINE

## 2018-06-14 PROCEDURE — 96365 THER/PROPH/DIAG IV INF INIT: CPT

## 2018-06-14 RX ORDER — ZOLEDRONIC ACID 5 MG/100ML
5 INJECTION, SOLUTION INTRAVENOUS ONCE
Status: CANCELLED
Start: 2018-06-14 | End: 2018-06-14

## 2018-06-14 RX ORDER — ZOLEDRONIC ACID 5 MG/100ML
5 INJECTION, SOLUTION INTRAVENOUS ONCE
Status: COMPLETED | OUTPATIENT
Start: 2018-06-14 | End: 2018-06-14

## 2018-06-14 RX ADMIN — ZOLEDRONIC ACID 5 MG: 0.05 INJECTION, SOLUTION INTRAVENOUS at 10:27

## 2018-06-14 NOTE — MR AVS SNAPSHOT
After Visit Summary   6/14/2018    Janette Rahman    MRN: [id number]           Patient Information     Date Of Birth          1940        Visit Information        Provider Department      6/14/2018 10:00 AM  51 ATC; [facility] Special[facility] Procedure        Today's Diagnoses     Osteopenia of multiple sites    -  1      Care Instructions    Zometa or Reclast  Generic Name: Zoledronic Acid  Drug type  Zometa or Reclast works to slow the growth of cancer in the bones.  What this drug is used for  Bone metastasis (spread of cancer to the bones), or ________________________________________  How this drug is given  This drug is given through an IV line, a small tube inserted into a vein. Please let your nurse know right away if you feel pain, discomfort or heat during your infusion; or if you see redness on the skin where the IV is placed.  The amount of medicine that you receive depends on many things. Your doctor will decide on the best dose for you.   Make sure your health care team knows about all the medicines and supplements you take. This will help prevent drug interactions.   Side effects  Most people do not have all the side effects listed. Side effects usually go away after the treatment is complete. Some of the rare side effects are not listed here, so tell your doctor if you have any unusual symptoms.  Common side effects   (occur in more than 3 out of 10 of people):    Weakness    Fever    Chills    Feeling tired    Feeling dizzy    Feeling sleepy  Less common side effects   (occur in less than 3 out of 10 people):    Bone, joint or muscle pain    Headache    Confusion    Redness at IV site    Low blood calcium    Feel sick to your stomach (nausea)  Rare but serious side effects    Damage to the jaw bone. Tell your dentist that you are taking this drug before having any dental work. Be sure to have regular dental exams.    Kidney problems  Call  your doctor right away if you have:    Signs of an allergic reaction: Breathing problems, feel like your throat is closing up, swelling of the mouth, face, lips or tongue, or hives (red, itchy blotches on the skin).    Fever of 100.5  F (38 C) or higher or chills    Feel faint or dizzy    Feel confused  Call your doctor within 24 hours if you feel:    So sick to your stomach, that you cannot eat    So tired that you cannot care for yourself    You are vomiting more than 5 times in 24 hours    Unable to eat or drink for 24 hours  If you have any side effects, call us. We can help you cope with them.   Other information: __________________________  _________________________________________  _________________________________________  For more information, see:  www.chemocare.com   www.medlineplus.gov/druginformation.htm  www.cancer.gov/cancertopics/coping/chemotherapy-and-you  For informational purposes only. Not to replace the advice of your health care provider.   Copyright   2016 [facility]. All rights reserved. nu[employer] 349774 - 11/16.            Follow-ups after your visit        Who to contact     If you have questions or need follow up information about today's clinic visit or your schedule please contact [facility] directly at [phone].  Normal or non-critical lab and imaging results will be communicated to you by MyChart, letter or phone within 4 business days after the clinic has received the results. If you do not hear from us within 7 days, please contact the clinic through DropMathart or phone. If you have a critical or abnormal lab result, we will notify you by phone as soon as possible.  Submit refill requests through appsFreedom or call your pharmacy and they will forward the refill request to us. Please allow 3 business days for your refill to be completed.          Additional Information About Your Visit        MyChart Information     appsFreedom  gives you secure access to your electronic health record. If you see a primary care provider, you can also send messages to your care team and make appointments. If you have questions, please call your primary care clinic.  If you do not have a primary care provider, please call [phone] and they will assist you.        Care EveryWhere ID     This is your Care EveryWhere ID. This could be used by other organizations to access your [facility] medical records  [id number]        Your Vitals Were     Pulse Temperature Respirations Pulse Oximetry          57 97.3  F (36.3  C) (Oral) 16 98%         Blood Pressure from Last 3 Encounters:   06/14/18 132/53   06/08/18 115/66   05/25/17 137/75    Weight from Last 3 Encounters:   06/08/18 63.4 kg (139 lb 12.8 oz)   05/25/17 63.5 kg (139 lb 14.4 oz)   09/15/16 61 kg (134 lb 8 oz)              Today, you had the following     No orders found for display       Primary Care Provider Office Phone # Fax #    Alyssa Mohini Lopez [phone]       [address] [place] [phone]  [facility] [address]        Equal Access to Services     RAJESH GUTIÉRREZ : Hadii deisy [place], waaxda luzinaadaha, qaybta kaalmada adecourtneyyada, uzma maravilla. So [facility] [phone].    ATENCIÓN: Si habla español, tiene a alfonso disposición servicios gratuitos de asistencia lingüística. [facility] [phone].    We comply with applicable federal civil rights laws and [place] laws. We do not discriminate on the basis of race, color, national origin, age, disability, sex, sexual orientation, or gender identity.            Thank you!     Thank you for choosing [facility] SPECIALTY AND PROCEDURE  for your care. Our goal is always to provide you with excellent care. Hearing back from our patients is one way we can continue to improve our services. Please take a few minutes to complete the written survey that you may receive in the mail after  your visit with us. Thank you!             Your Updated Medication List - Protect others around you: Learn how to safely use, store and throw away your medicines at www.disposemymeds.org.          This list is accurate as of 6/14/18 10:17 AM.  Always use your most recent med list.                   Brand Name Dispense Instructions for use Diagnosis    amLODIPine 10 MG tablet    NORVASC    90 tablet    Take 1 tablet (10 mg) by mouth daily    Essential hypertension with goal blood pressure less than 140/90       atorvastatin 20 MG tablet    LIPITOR    90 tablet    Take 1 tablet (20 mg) by mouth daily    Need for prophylactic vaccination with tetanus-diphtheria (TD)       COQ-10 PO      Take by mouth daily        fluticasone 50 MCG/ACT spray    FLONASE    3 Bottle    Spray 1-2 sprays into both nostrils daily    Post-nasal drainage       hydrochlorothiazide 25 MG tablet    HYDRODIURIL    90 tablet    Take 1 tablet (25 mg) by mouth daily    Essential hypertension with goal blood pressure less than 140/90       losartan 100 MG tablet    COZAAR    90 tablet    Take 1 tablet (100 mg) by mouth daily    Essential hypertension with goal blood pressure less than 140/90       meclizine 25 MG tablet    ANTIVERT    30 tablet    Take 1 tablet (25 mg) by mouth every 6 hours as needed for dizziness    Dizziness       OMEGA 3 PO      Take by mouth daily        PROBIOTIC DAILY PO      Take by mouth daily

## 2018-06-14 NOTE — PATIENT INSTRUCTIONS
Zometa or Reclast  Generic Name: Zoledronic Acid  Drug type  Zometa or Reclast works to slow the growth of cancer in the bones.  What this drug is used for  Bone metastasis (spread of cancer to the bones), or ________________________________________  How this drug is given  This drug is given through an IV line, a small tube inserted into a vein. Please let your nurse know right away if you feel pain, discomfort or heat during your infusion; or if you see redness on the skin where the IV is placed.  The amount of medicine that you receive depends on many things. Your doctor will decide on the best dose for you.   Make sure your health care team knows about all the medicines and supplements you take. This will help prevent drug interactions.   Side effects  Most people do not have all the side effects listed. Side effects usually go away after the treatment is complete. Some of the rare side effects are not listed here, so tell your doctor if you have any unusual symptoms.  Common side effects   (occur in more than 3 out of 10 of people):    Weakness    Fever    Chills    Feeling tired    Feeling dizzy    Feeling sleepy  Less common side effects   (occur in less than 3 out of 10 people):    Bone, joint or muscle pain    Headache    Confusion    Redness at IV site    Low blood calcium    Feel sick to your stomach (nausea)  Rare but serious side effects    Damage to the jaw bone. Tell your dentist that you are taking this drug before having any dental work. Be sure to have regular dental exams.    Kidney problems  Call your doctor right away if you have:    Signs of an allergic reaction: Breathing problems, feel like your throat is closing up, swelling of the mouth, face, lips or tongue, or hives (red, itchy blotches on the skin).    Fever of 100.5  F (38 C) or higher or chills    Feel faint or dizzy    Feel confused  Call your doctor within 24 hours if you feel:    So sick to your stomach, that you cannot eat    So  tired that you cannot care for yourself    You are vomiting more than 5 times in 24 hours    Unable to eat or drink for 24 hours  If you have any side effects, call us. We can help you cope with them.   Other information: __________________________  _________________________________________  _________________________________________  For more information, see:  www.chemocare.com   www.medlineplus.gov/druginformation.htm  www.cancer.gov/cancertopics/coping/chemotherapy-and-you  For informational purposes only. Not to replace the advice of your health care provider.   Copyright   2016 [facility]. All rights reserved. [employer] 507006 - 11/16.

## 2018-06-14 NOTE — PROGRESS NOTES
Nursing Note  Janette Rah[facility] for:   During [facility]'s [facility] appointment, orders from Alyssa Lopez MD  were completed.  Frequency: once    Progress note:  Patient identification verified by name and date of birth.  Assessment completed.  Vitals recorded in Doc Flowsheets.  Patient was provided with education regarding infusion and possible side effects.  Patient verbalized understanding.      needed: No  Premedications: were not ordered.  Infusion Rates: infusion given over approximately 15 minutes.  Approximate Infusion length:30 minutes.   Labs: were drawn prior to appointment on 06/08/18.  Vascular access: peripheral IV placed today.  Treatment Conditions: RN noticed that K+ from 6/8/18 was 3.0 mg/dl. Educated on high K+ foods.  Patient tolerated infusion: well.      Discharge Plan:   Follow up plan of care with: primary medical doctor.  Discharge instructions were reviewed with patient.  Patient/representative verbalized understanding of discharge instructions and all questions answered.  [facility] discharged [facility] in stable condition.    Mohini Bacon RN    Administrations This Visit     zoledronic Acid (RECLAST) infusion 5 mg     Admin Date Action Dose Rate Route Administered By          06/14/2018 New Bag 5 mg 400 mL/hr Intravenous Mohini Bacon RN                         /53  Pulse 57  Temp 97.3  F (36.3  C) (Oral)  Resp 16  SpO2 98%

## 2018-09-17 DIAGNOSIS — Z23 NEED FOR PROPHYLACTIC VACCINATION WITH TETANUS-DIPHTHERIA (TD): ICD-10-CM

## 2018-09-17 DIAGNOSIS — I10 ESSENTIAL HYPERTENSION WITH GOAL BLOOD PRESSURE LESS THAN 140/90: ICD-10-CM

## 2018-09-18 RX ORDER — HYDROCHLOROTHIAZIDE 25 MG/1
25 TABLET ORAL DAILY
Qty: 90 TABLET | Refills: 0 | Status: SHIPPED | OUTPATIENT
Start: 2018-09-18 | End: 2018-11-19

## 2018-09-18 RX ORDER — AMLODIPINE BESYLATE 10 MG/1
10 TABLET ORAL DAILY
Qty: 90 TABLET | Refills: 2 | Status: SHIPPED | OUTPATIENT
Start: 2018-09-18 | End: 2019-03-07

## 2018-09-18 RX ORDER — ATORVASTATIN CALCIUM 20 MG/1
20 TABLET, FILM COATED ORAL DAILY
Qty: 90 TABLET | Refills: 2 | Status: SHIPPED | OUTPATIENT
Start: 2018-09-18 | End: 2019-03-07

## 2018-09-18 RX ORDER — LOSARTAN POTASSIUM 100 MG/1
100 TABLET ORAL DAILY
Qty: 90 TABLET | Refills: 0 | Status: SHIPPED | OUTPATIENT
Start: 2018-09-18 | End: 2018-11-19

## 2018-09-18 NOTE — TELEPHONE ENCOUNTER
hydrochlorothiazide (HYDRODIURIL) 25 MG  Last Written Prescription Date:  6/8/18  Last Fill Quantity: 90,   # refills: 0  Last Office Visit : 6/8/18  Future Office visit:  NONE    losartan (COZAAR) 100 MG     Last Written Prescription Date:  6/8/18  Last Fill Quantity: 90,   # refills: 0     2 MEDS Routing refill request for review/approval because: 90 DAY RF PENDING   ABN  LAB K+

## 2018-09-23 ENCOUNTER — MYC REFILL (OUTPATIENT)
Dept: INTERNAL MEDICINE | Facility: CLINIC | Age: 78
End: 2018-09-23

## 2018-09-23 DIAGNOSIS — Z23 NEED FOR PROPHYLACTIC VACCINATION WITH TETANUS-DIPHTHERIA (TD): ICD-10-CM

## 2018-09-23 RX ORDER — ATORVASTATIN CALCIUM 20 MG/1
20 TABLET, FILM COATED ORAL DAILY
Qty: 90 TABLET | Refills: 2 | Status: CANCELLED | OUTPATIENT
Start: 2018-09-23

## 2018-11-19 DIAGNOSIS — I10 ESSENTIAL HYPERTENSION WITH GOAL BLOOD PRESSURE LESS THAN 140/90: ICD-10-CM

## 2018-11-23 RX ORDER — LOSARTAN POTASSIUM 100 MG/1
TABLET ORAL
Qty: 90 TABLET | Refills: 1 | Status: SHIPPED | OUTPATIENT
Start: 2018-11-23 | End: 2019-07-02

## 2018-11-23 RX ORDER — HYDROCHLOROTHIAZIDE 25 MG/1
TABLET ORAL
Qty: 90 TABLET | Refills: 1 | Status: SHIPPED | OUTPATIENT
Start: 2018-11-23 | End: 2019-03-07

## 2018-11-23 NOTE — TELEPHONE ENCOUNTER
Losartan and HCTZ    Last Written Prescription Date:  9-18-18  Last Fill Quantity: 90,   # refills: 0  Last Office Visit : 6-8-18  Future Office visit:  none    Routing refill request to clinic RN for review/approval because:  Abnormal Potassium.      Kathleen M Doege RN

## 2019-03-06 ENCOUNTER — TELEPHONE (OUTPATIENT)
Dept: INTERNAL MEDICINE | Facility: CLINIC | Age: 79
End: 2019-03-06

## 2019-03-06 DIAGNOSIS — I10 ESSENTIAL HYPERTENSION WITH GOAL BLOOD PRESSURE LESS THAN 140/90: ICD-10-CM

## 2019-03-06 DIAGNOSIS — Z23 NEED FOR PROPHYLACTIC VACCINATION WITH TETANUS-DIPHTHERIA (TD): ICD-10-CM

## 2019-03-06 NOTE — TELEPHONE ENCOUNTER
[facility] Call Center    Phone Message    May a detailed message be left on voicemail: yes, Pt is wanting a call back from Alyssa Trujillo    Reason for Call: Medication Question or concern regarding medication   Prescription Clarification  Name of Medication:   Prescribing Provider: losartan (COZAAR) 100 MG tablet   What on the order needs clarification? Pt states she heard a recall on the medication and is wanting to know what to do. Pt states she had taken the medication today and is wanting to know if Pt is needing to throw away medication or what do to.       Action Taken: Message routed to:  [facility] ([facility] pcc

## 2019-03-06 NOTE — TELEPHONE ENCOUNTER
Health Call Center    Phone Message    May a detailed message be left on voicemail: yes, Pt would like a call when Prescriptions has been sent to the pharmacy.    Reason for Call: Medication Refill Request    Has the patient contacted the pharmacy for the refill? Yes   Name of medication being requested: hydrochlorothiazide (HYDRODIURIL) 25 MG tablet,  amLODIPine (NORVASC) 10 MG tablet,  atorvastatin (LIPITOR) 20 MG tablet    losartan (COZAAR) 100 MG tablet, Pt is needing a replaced. Losartan has been recalled, Pt is wanting to make sure     Provider who prescribed the medication: Alyssa Lopez    Pharmacy: [phone]  [facility] [id number] [facility] [address] [address]    Date medication is needed: 3/8/2019      Action Taken: Message routed to:  [facility] ([facility] wendi

## 2019-03-07 RX ORDER — HYDROCHLOROTHIAZIDE 25 MG/1
25 TABLET ORAL DAILY
Qty: 90 TABLET | Refills: 1 | Status: SHIPPED | OUTPATIENT
Start: 2019-03-07 | End: 2019-09-10

## 2019-03-07 RX ORDER — AMLODIPINE BESYLATE 10 MG/1
10 TABLET ORAL DAILY
Qty: 90 TABLET | Refills: 2 | Status: SHIPPED | OUTPATIENT
Start: 2019-03-07 | End: 2019-06-14

## 2019-03-07 RX ORDER — ATORVASTATIN CALCIUM 20 MG/1
20 TABLET, FILM COATED ORAL DAILY
Qty: 90 TABLET | Refills: 1 | Status: SHIPPED | OUTPATIENT
Start: 2019-03-07 | End: 2019-07-15

## 2019-03-07 NOTE — TELEPHONE ENCOUNTER
Last Clinic Visit: 6/8/2018  [facility]    Low potassium discussed with pt in My Chart message of 6-15-18 - no medication changes to be made.

## 2019-06-14 ENCOUNTER — ANCILLARY PROCEDURE (OUTPATIENT)
Dept: MAMMOGRAPHY | Facility: CLINIC | Age: 79
End: 2019-06-14
Attending: INTERNAL MEDICINE
Payer: COMMERCIAL

## 2019-06-14 ENCOUNTER — OFFICE VISIT (OUTPATIENT)
Dept: INTERNAL MEDICINE | Facility: CLINIC | Age: 79
End: 2019-06-14
Payer: COMMERCIAL

## 2019-06-14 ENCOUNTER — TELEPHONE (OUTPATIENT)
Dept: CARE COORDINATION | Facility: CLINIC | Age: 79
End: 2019-06-14

## 2019-06-14 ENCOUNTER — ANCILLARY PROCEDURE (OUTPATIENT)
Dept: BONE DENSITY | Facility: CLINIC | Age: 79
End: 2019-06-14
Attending: INTERNAL MEDICINE
Payer: COMMERCIAL

## 2019-06-14 VITALS
OXYGEN SATURATION: 99 % | HEART RATE: 59 BPM | WEIGHT: 139 LBS | DIASTOLIC BLOOD PRESSURE: 74 MMHG | SYSTOLIC BLOOD PRESSURE: 118 MMHG | BODY MASS INDEX: 26.08 KG/M2

## 2019-06-14 DIAGNOSIS — I10 ESSENTIAL HYPERTENSION WITH GOAL BLOOD PRESSURE LESS THAN 140/90: ICD-10-CM

## 2019-06-14 DIAGNOSIS — M89.9 DISORDER OF BONE: ICD-10-CM

## 2019-06-14 DIAGNOSIS — E78.5 HYPERLIPIDEMIA LDL GOAL <100: ICD-10-CM

## 2019-06-14 DIAGNOSIS — Z12.31 VISIT FOR SCREENING MAMMOGRAM: ICD-10-CM

## 2019-06-14 DIAGNOSIS — N95.9 MENOPAUSAL AND PERIMENOPAUSAL DISORDER: ICD-10-CM

## 2019-06-14 DIAGNOSIS — R73.09 ELEVATED GLUCOSE: ICD-10-CM

## 2019-06-14 DIAGNOSIS — R42 DIZZINESS: ICD-10-CM

## 2019-06-14 DIAGNOSIS — M85.80 OSTEOPENIA, UNSPECIFIED LOCATION: ICD-10-CM

## 2019-06-14 DIAGNOSIS — M85.80 OSTEOPENIA, UNSPECIFIED LOCATION: Primary | ICD-10-CM

## 2019-06-14 DIAGNOSIS — H81.10 BENIGN PAROXYSMAL POSITIONAL VERTIGO, UNSPECIFIED LATERALITY: ICD-10-CM

## 2019-06-14 LAB
ANION GAP SERPL CALCULATED.3IONS-SCNC: 8 MMOL/L (ref 3–14)
BUN SERPL-MCNC: 11 MG/DL (ref 7–30)
CALCIUM SERPL-MCNC: 9.7 MG/DL (ref 8.5–10.1)
CHLORIDE SERPL-SCNC: 101 MMOL/L (ref 94–109)
CHOLEST SERPL-MCNC: 191 MG/DL
CO2 SERPL-SCNC: 28 MMOL/L (ref 20–32)
CREAT SERPL-MCNC: 0.57 MG/DL (ref 0.52–1.04)
GFR SERPL CREATININE-BSD FRML MDRD: 90 ML/MIN/{1.73_M2}
GLUCOSE SERPL-MCNC: 102 MG/DL (ref 70–99)
HDLC SERPL-MCNC: 70 MG/DL
LDLC SERPL CALC-MCNC: 100 MG/DL
NONHDLC SERPL-MCNC: 121 MG/DL
POTASSIUM SERPL-SCNC: 3.2 MMOL/L (ref 3.4–5.3)
SODIUM SERPL-SCNC: 137 MMOL/L (ref 133–144)
TRIGL SERPL-MCNC: 105 MG/DL

## 2019-06-14 PROCEDURE — 36415 COLL VENOUS BLD VENIPUNCTURE: CPT | Performed by: INTERNAL MEDICINE

## 2019-06-14 PROCEDURE — 80061 LIPID PANEL: CPT | Performed by: INTERNAL MEDICINE

## 2019-06-14 PROCEDURE — 77080 DXA BONE DENSITY AXIAL: CPT | Mod: 59 | Performed by: RADIOLOGY

## 2019-06-14 PROCEDURE — 80048 BASIC METABOLIC PNL TOTAL CA: CPT | Performed by: INTERNAL MEDICINE

## 2019-06-14 PROCEDURE — 77081 DXA BONE DENSITY APPENDICULR: CPT | Performed by: RADIOLOGY

## 2019-06-14 PROCEDURE — 77067 SCR MAMMO BI INCL CAD: CPT

## 2019-06-14 RX ORDER — AMLODIPINE BESYLATE 10 MG/1
5 TABLET ORAL DAILY
Qty: 90 TABLET | Refills: 2
Start: 2019-06-14 | End: 2019-08-16

## 2019-06-14 RX ORDER — LORAZEPAM 0.5 MG/1
0.5 TABLET ORAL EVERY 6 HOURS PRN
Qty: 10 TABLET | Refills: 0 | Status: SHIPPED | OUTPATIENT
Start: 2019-06-14 | End: 2020-07-06

## 2019-06-14 RX ORDER — MECLIZINE HYDROCHLORIDE 25 MG/1
25 TABLET ORAL EVERY 6 HOURS PRN
Qty: 30 TABLET | Refills: 0 | Status: SHIPPED | OUTPATIENT
Start: 2019-06-14 | End: 2020-07-06

## 2019-06-14 ASSESSMENT — PAIN SCALES - GENERAL: PAINLEVEL: NO PAIN (0)

## 2019-06-14 ASSESSMENT — ENCOUNTER SYMPTOMS
FEVER: 0
SHORTNESS OF BREATH: 0
PALPITATIONS: 0
ALTERED TEMPERATURE REGULATION: 0
COUGH: 0
ABDOMINAL PAIN: 0

## 2019-06-14 NOTE — PATIENT INSTRUCTIONS
[facility] Medication Refill Request Information:  * Please contact your pharmacy regarding ANY request for medication refills.  ** [facility] Prescription Fax = [phone]  * Please allow 3 business days for routine medication refills.  * Please allow 5 business days for controlled substance medication refills.     [facility] Test Result notification information:  *You will be notified with in 7-10 days of your appointment day regarding the results of your test.  If you are on MyChart you will be notified as soon as the provider has reviewed the results and signed off on them.    [facility]: [phone]

## 2019-06-14 NOTE — PROGRESS NOTES
CC:   Chief Complaint   Patient presents with     Physical     Patient is here for her annual physical      Dizziness     episode of vertigo last night per patient        History of Present Illness   Janette Rahman is here for a routine physical.  She is overall doing well.    The health of her  has declined over the past year due to Alzheimers.  He is no longer able to do basic grooming, like brush his teeth independently.  She is wondering what homes ervices might be available.    Episode of severe vertigo last night.  Started when when laying left side and worse with position changes.  She had similar episodes in 2016.    BP: part of a new study that includes a BP cuff that communicates with her phone.      Gyne:  No vaginal bleeding  Depression screen:  PHQ-2 Score:     PHQ-2 ( 1999 [employer]) 6/14/2019   Q1: Little interest or pleasure in doing things 0   Q2: Feeling down, depressed or hopeless 0   PHQ-2 Score 0   Q1: Little interest or pleasure in doing things Not at all   Q2: Feeling down, depressed or hopeless Not at all   PHQ-2 Score 0     Mood is doing ok.  She is trying to take some time for herself in addition to being her 's primary caregiver    Tobacco screen:  History   Smoking Status     Never Smoker   Smokeless Tobacco     Never Used     Obesity screen:  Body mass index is 26.08 kg/m .  Walking regularly.  Getting a lot of exercise with daily routine    Review of Systems   Review of Systems     Constitutional:  Negative for fever.   Eyes:  Negative for decreased vision.   Respiratory:   Negative for cough and shortness of breath.    Cardiovascular:  Negative for chest pain and palpitations.   Gastrointestinal:  Negative for abdominal pain.   Skin:  Negative for rash.   Neurological:  Negative for difficulty walking.   Psychiatric/Behavioral:  Negative for mood swings.    Endocrine:  Negative for altered temperature regulation.      Medications   Medications and allergies were reviewed  and updated in the chart    Family History   Family history was reviewed and updated as needed in the chart    Past Medical History   Past medical history was reviewed and updated  Patient Active Problem List   Diagnosis     Essential hypertension, benign     HLD (hyperlipidemia)     Inflamed seborrheic keratosis     Osteopenia       Physical Exam   /74 (BP Location: Right arm, Patient Position: Sitting, Cuff Size: Adult Regular)   Pulse 59   Wt 63 kg (139 lb)   LMP  (LMP Unknown)   SpO2 99%   Breastfeeding? No   BMI 26.08 kg/m      GENERAL APPEARANCE: healthy, alert and no distress     EYES: EOMI,     HENT: ear canals and TM's normal and nose and mouth without ulcers or lesions     NECK: no adenopathy, no asymmetry, masses, or scars and thyroid normal to palpation     RESP: lungs clear to auscultation - no rales, rhonchi or wheezes     CV: regular rates and rhythm, normal S1 S2, no S3 or S4 and no murmur, click or rub -     ABDOMEN:  soft, nontender, no HSM or masses and bowel sounds normal     : normal cervix, adnexae, and uterus without masses or discharge and rectal exam normal without masses     MS: extremities normal- no gross deformities noted, no evidence of inflammation in joints, FROM in all extremities.     SKIN: no suspicious lesions or rashes     NEURO: Normal strength and tone, sensory exam grossly normal, mentation intact and speech normal     PSYCH: mentation appears normal. and affect normal/bright     LYMPHATICS: No axillary, cervical, inguinal, or supraclavicular nodes      Assessment & Plan   # Preventive Care Visit:     Essential hypertension with goal blood pressure less than 140/90  - BASIC METABOLIC PANEL (Today)  - amLODIPine (NORVASC) 10 MG tablet  Dispense: 90 tablet; Refill: 2    Osteopenia, unspecified location  - DEXA HIP/PELVIS/SPINE - Future    Hyperlipidemia LDL goal <100  - Lipid panel reflex to direct LDL Fasting    Benign paroxysmal positional vertigo, unspecified  laterality  Dizziness  Recurrent BPPV  - PHYSICAL THERAPY REFERRAL  - meclizine (ANTIVERT) 25 MG tablet  Dispense: 30 tablet; Refill: 0  - LORazepam (ATIVAN) 0.5 MG tablet  Dispense: 10 tablet; Refill: 0    Elevated glucose  - Hemoglobin A1c      RTC: No follow-ups on file.      Alyssa Lopez MD

## 2019-06-14 NOTE — PROGRESS NOTES
.Rooming Note  Health Maintenance   Health Maintenance Due   Topic Date Due     ADVANCED DIRECTIVE PLANNING  1940     ZOSTER IMMUNIZATION (2 of 3) 12/31/2013     PHQ-2  01/01/2019     DEXA  05/02/2019     BMP  06/08/2019     MEDICARE ANNUAL WELLNESS VISIT  06/08/2019    All health maintenance items discussed.

## 2019-06-14 NOTE — TELEPHONE ENCOUNTER
Social Work Telephone Message Note  [email or web address]    Patient Name:  Janette Rahman  /Age:  1940 (78 year old)    Referral Source: PCC  Reason for Referral:  Patient looking for resources to help her care for her spouse with Alzheimers.      contacted Patient via telephone today. Patient reported today was not a good time and requested a call back next week.  will call back and will provide assistance at that time.    FABIAN Harman  [facility]  Direct Phone: [phone]  Pager:  [phone]

## 2019-06-14 NOTE — NURSING NOTE
Chief Complaint   Patient presents with     Physical     Patient is here for her annual physical      Dizziness     episode of vertigo last night per patient        Flory Watson EMT at 9:07 AM on 6/14/2019.

## 2019-06-18 ENCOUNTER — MEDICAL CORRESPONDENCE (OUTPATIENT)
Dept: HEALTH INFORMATION MANAGEMENT | Facility: CLINIC | Age: 79
End: 2019-06-18

## 2019-06-20 ENCOUNTER — MYC MEDICAL ADVICE (OUTPATIENT)
Dept: INTERNAL MEDICINE | Facility: CLINIC | Age: 79
End: 2019-06-20

## 2019-07-02 DIAGNOSIS — I10 ESSENTIAL HYPERTENSION WITH GOAL BLOOD PRESSURE LESS THAN 140/90: ICD-10-CM

## 2019-07-07 RX ORDER — LOSARTAN POTASSIUM 100 MG/1
100 TABLET ORAL DAILY
Qty: 90 TABLET | Refills: 3 | Status: SHIPPED | OUTPATIENT
Start: 2019-07-07 | End: 2019-07-09

## 2019-07-07 NOTE — TELEPHONE ENCOUNTER
losartan (COZAAR) 100 MG tablet   Last Written Prescription Date:  11/23/18  Last Fill Quantity: 90,   # refills: 1  Last Office Visit : 6/14/19  Future Office visit: none    K+ L    All Reviewers List     Alyssa Lopez MD on 6/14/2019  3:09 PM

## 2019-07-08 ENCOUNTER — TELEPHONE (OUTPATIENT)
Dept: INTERNAL MEDICINE | Facility: CLINIC | Age: 79
End: 2019-07-08

## 2019-07-08 DIAGNOSIS — I10 ESSENTIAL HYPERTENSION WITH GOAL BLOOD PRESSURE LESS THAN 140/90: ICD-10-CM

## 2019-07-08 NOTE — TELEPHONE ENCOUNTER
[facility] Call Center    Phone Message    May a detailed message be left on voicemail: yes    Reason for Call: Other: Patient wanted RX losartan (COZAAR) 100 MG tablet  sent to [facility] in [facility] and it appears this was sent to [facility] in error.  Please fix and let patient know once complete.      Action Taken: Message routed to:  [facility] ([facility]): [facility]

## 2019-07-09 RX ORDER — LOSARTAN POTASSIUM 100 MG/1
100 TABLET ORAL DAILY
Qty: 90 TABLET | Refills: 3 | Status: SHIPPED | OUTPATIENT
Start: 2019-07-09 | End: 2020-03-06

## 2019-07-09 NOTE — TELEPHONE ENCOUNTER
Refill changed to correct pharmacy.  Patient was called with this information.      Kathleen M Doege RN

## 2019-07-15 DIAGNOSIS — Z23 NEED FOR PROPHYLACTIC VACCINATION WITH TETANUS-DIPHTHERIA (TD): ICD-10-CM

## 2019-07-15 RX ORDER — ATORVASTATIN CALCIUM 20 MG/1
20 TABLET, FILM COATED ORAL DAILY
Qty: 90 TABLET | Refills: 3 | Status: SHIPPED | OUTPATIENT
Start: 2019-07-15 | End: 2020-07-06

## 2019-08-05 ENCOUNTER — TELEPHONE (OUTPATIENT)
Dept: INTERNAL MEDICINE | Facility: CLINIC | Age: 79
End: 2019-08-05

## 2019-08-05 RX ORDER — ZOLEDRONIC ACID 5 MG/100ML
5 INJECTION, SOLUTION INTRAVENOUS ONCE
Status: CANCELLED
Start: 2019-08-05

## 2019-08-05 RX ORDER — HEPARIN SODIUM (PORCINE) LOCK FLUSH IV SOLN 100 UNIT/ML 100 UNIT/ML
5 SOLUTION INTRAVENOUS
Status: CANCELLED | OUTPATIENT
Start: 2019-08-05

## 2019-08-05 RX ORDER — HEPARIN SODIUM,PORCINE 10 UNIT/ML
5 VIAL (ML) INTRAVENOUS
Status: CANCELLED | OUTPATIENT
Start: 2019-08-05

## 2019-08-05 NOTE — TELEPHONE ENCOUNTER
RN assisted with call. I called the patient to advise the status. Yesika Kaur Paramedic on 8/5/2019 at 12:12 PM

## 2019-08-05 NOTE — TELEPHONE ENCOUNTER
[facility] Call Center    Phone Message    May a detailed message be left on voicemail: no    Reason for Call: Order(s): Other:   Reason for requested: Reclast Infusions, the orders entered by Dr. Lopez were not signed, they need to be signed to be carried out. Pt has been waiting on this since June. Patient relations # provided to Pt. Please call Pt back to f/u.  Date needed: Asap  Provider name: Dr. Lopez      Action Taken: Message routed to:  [facility] ([facility]): [facility]

## 2019-08-12 ENCOUNTER — INFUSION THERAPY VISIT (OUTPATIENT)
Dept: INFUSION THERAPY | Facility: CLINIC | Age: 79
End: 2019-08-12
Attending: INTERNAL MEDICINE
Payer: COMMERCIAL

## 2019-08-12 ENCOUNTER — APPOINTMENT (OUTPATIENT)
Dept: LAB | Facility: CLINIC | Age: 79
End: 2019-08-12
Attending: INTERNAL MEDICINE
Payer: COMMERCIAL

## 2019-08-12 VITALS
OXYGEN SATURATION: 98 % | HEART RATE: 50 BPM | DIASTOLIC BLOOD PRESSURE: 70 MMHG | WEIGHT: 137.8 LBS | SYSTOLIC BLOOD PRESSURE: 136 MMHG | BODY MASS INDEX: 25.85 KG/M2 | TEMPERATURE: 97.5 F | RESPIRATION RATE: 16 BRPM

## 2019-08-12 DIAGNOSIS — M85.80 OSTEOPENIA: ICD-10-CM

## 2019-08-12 DIAGNOSIS — M85.89 OSTEOPENIA OF MULTIPLE SITES: Primary | ICD-10-CM

## 2019-08-12 LAB
CALCIUM SERPL-MCNC: 9.3 MG/DL (ref 8.5–10.1)
CREAT SERPL-MCNC: 0.66 MG/DL (ref 0.52–1.04)
GFR SERPL CREATININE-BSD FRML MDRD: 86 ML/MIN/{1.73_M2}

## 2019-08-12 PROCEDURE — 96365 THER/PROPH/DIAG IV INF INIT: CPT

## 2019-08-12 PROCEDURE — 25000128 H RX IP 250 OP 636: Mod: ZF | Performed by: INTERNAL MEDICINE

## 2019-08-12 PROCEDURE — 82310 ASSAY OF CALCIUM: CPT | Performed by: INTERNAL MEDICINE

## 2019-08-12 PROCEDURE — 82565 ASSAY OF CREATININE: CPT | Performed by: INTERNAL MEDICINE

## 2019-08-12 RX ORDER — HEPARIN SODIUM,PORCINE 10 UNIT/ML
5 VIAL (ML) INTRAVENOUS
Status: CANCELLED | OUTPATIENT
Start: 2019-08-12

## 2019-08-12 RX ORDER — ZOLEDRONIC ACID 5 MG/100ML
5 INJECTION, SOLUTION INTRAVENOUS ONCE
Status: COMPLETED | OUTPATIENT
Start: 2019-08-12 | End: 2019-08-12

## 2019-08-12 RX ORDER — ZOLEDRONIC ACID 5 MG/100ML
5 INJECTION, SOLUTION INTRAVENOUS ONCE
Status: CANCELLED
Start: 2019-08-12

## 2019-08-12 RX ORDER — HEPARIN SODIUM (PORCINE) LOCK FLUSH IV SOLN 100 UNIT/ML 100 UNIT/ML
5 SOLUTION INTRAVENOUS
Status: CANCELLED | OUTPATIENT
Start: 2019-08-12

## 2019-08-12 RX ADMIN — ZOLEDRONIC ACID 5 MG: 0.05 INJECTION, SOLUTION INTRAVENOUS at 12:05

## 2019-08-12 ASSESSMENT — PAIN SCALES - GENERAL: PAINLEVEL: NO PAIN (0)

## 2019-08-12 NOTE — PATIENT INSTRUCTIONS
Patient Education     Patient Education    Zoledronic Acid Solution for injection    Zoledronic Acid Solution for injection [Hypercalcemia of Malignancy]    Zoledronic Acid Solution for injection [Pagets Disease]  Zoledronic Acid Solution for injection  What is this medicine?  ZOLEDRONIC ACID (STEPHON le dron ik AS id) lowers the amount of calcium loss from bone. It is used to treat Paget's disease and osteoporosis in women.  This medicine may be used for other purposes; ask your health care provider or pharmacist if you have questions.  What should I tell my health care provider before I take this medicine?  They need to know if you have any of these conditions:    aspirin-sensitive asthma    cancer, especially if you are receiving medicines used to treat cancer    dental disease or wear dentures    infection    kidney disease    low levels of calcium in the blood    past surgery on the parathyroid gland or intestines    receiving corticosteroids like dexamethasone or prednisone    an unusual or allergic reaction to zoledronic acid, other medicines, foods, dyes, or preservatives    pregnant or trying to get pregnant    breast-feeding  How should I use this medicine?  This medicine is for infusion into a vein. It is given by a health care professional in a hospital or clinic setting.  Talk to your pediatrician regarding the use of this medicine in children. This medicine is not approved for use in children.  Overdosage: If you think you have taken too much of this medicine contact a poison control center or emergency room at once.  NOTE: This medicine is only for you. Do not share this medicine with others.  What if I miss a dose?  It is important not to miss your dose. Call your doctor or health care professional if you are unable to keep an appointment.  What may interact with this medicine?    certain antibiotics given by injection    NSAIDs, medicines for pain and inflammation, like ibuprofen or naproxen    some  diuretics like bumetanide, furosemide    teriparatide  This list may not describe all possible interactions. Give your health care provider a list of all the medicines, herbs, non-prescription drugs, or dietary supplements you use. Also tell them if you smoke, drink alcohol, or use illegal drugs. Some items may interact with your medicine.  What should I watch for while using this medicine?  Visit your doctor or health care professional for regular checkups. It may be some time before you see the benefit from this medicine. Do not stop taking your medicine unless your doctor tells you to. Your doctor may order blood tests or other tests to see how you are doing.  Women should inform their doctor if they wish to become pregnant or think they might be pregnant. There is a potential for serious side effects to an unborn child. Talk to your health care professional or pharmacist for more information.  You should make sure that you get enough calcium and vitamin D while you are taking this medicine. Discuss the foods you eat and the vitamins you take with your health care professional.  Some people who take this medicine have severe bone, joint, and/or muscle pain. This medicine may also increase your risk for jaw problems or a broken thigh bone. Tell your doctor right away if you have severe pain in your jaw, bones, joints, or muscles. Tell your doctor if you have any pain that does not go away or that gets worse.  Tell your dentist and dental surgeon that you are taking this medicine. You should not have major dental surgery while on this medicine. See your dentist to have a dental exam and fix any dental problems before starting this medicine. Take good care of your teeth while on this medicine. Make sure you see your dentist for regular follow-up appointments.  What side effects may I notice from receiving this medicine?  Side effects that you should report to your doctor or health care professional as soon as  possible:    allergic reactions like skin rash, itching or hives, swelling of the face, lips, or tongue    anxiety, confusion, or depression    breathing problems    changes in vision    eye pain    feeling faint or lightheaded, falls    jaw pain, especially after dental work    mouth sores    muscle cramps, stiffness, or weakness    redness, blistering, peeling or loosening of the skin, including inside the mouth    trouble passing urine or change in the amount of urine  Side effects that usually do not require medical attention (report to your doctor or health care professional if they continue or are bothersome):    bone, joint, or muscle pain    constipation    diarrhea    fever    hair loss    irritation at site where injected    loss of appetite    nausea, vomiting    stomach upset    trouble sleeping    trouble swallowing    weak or tired  This list may not describe all possible side effects. Call your doctor for medical advice about side effects. You may report side effects to FDA at 3-738-FDA-5625.  Where should I keep my medicine?  This drug is given in a hospital or clinic and will not be stored at home.  NOTE:This sheet is a summary. It may not cover all possible information. If you have questions about this medicine, talk to your doctor, pharmacist, or health care provider. Copyright  2016 Gold Standard

## 2019-08-12 NOTE — PROGRESS NOTES
Nursing Note  Janette Rahman [facility] for:   Chief Complaint   Patient presents with     Blood Draw     Labs drawn via PIV by RN in lab. VS taken.     During today's [facility] appointment, orders from Dr. Lopez were completed.  Frequency: yearly    Progress note:  Patient identification verified by name and date of birth.  Assessment completed.  Vitals recorded in Doc Flowsheets.  Patient was provided with education regarding infusion and possible side effects.  Patient verbalized understanding.     present during visit today: Not Applicable.    Treatment Conditions: patient denies fever, chills, signs of infection, recent illness, on antibiotics, productive cough or elevated temperature.    Premedications: were not ordered.    Drug Waste Record: No    Infusion length and rate:  infusion given over approximately 15 minutes    Labs: drawn today, prior to appointment in second floor lab.    Vascular access: peripheral IV was placed prior to appointment at [facility] on 8/12/19 in [facility] lab.    Post Infusion Assessment:  Patient tolerated infusion without incident.     Discharge Plan:   Follow up plan of care with: ongoing infusions at [facility].  Discharge instructions were reviewed with patient.  Patient/representative verbalized understanding of discharge instructions and all questions answered.  Patient discharged from [facility] in stable condition.    Tatianna Mcclelland RN       Administrations This Visit     zoledronic Acid (RECLAST) infusion 5 mg     Admin Date  08/12/2019 Action  New Bag Dose  5 mg Rate  400 mL/hr Route  Intravenous Administered By  Tatianna Mcclelland RN                /73   Pulse 60   Temp 97.5  F (36.4  C) (Oral)   Resp 16   Wt 62.5 kg (137 lb 12.8 oz)   LMP  (LMP Unknown)   SpO2 98%   BMI 25.85 kg/m

## 2019-08-12 NOTE — NURSING NOTE
Chief Complaint   Patient presents with     Blood Draw     Labs drawn via PIV by RN in lab. VS taken.     Labs drawn via peripheral IV. Vital signs taken. Checked into next appointment.   Marlen Cabrera RN

## 2019-08-15 ENCOUNTER — TELEPHONE (OUTPATIENT)
Dept: INTERNAL MEDICINE | Facility: CLINIC | Age: 79
End: 2019-08-15

## 2019-08-15 NOTE — TELEPHONE ENCOUNTER
[facility] Call Center    Phone Message    May a detailed message be left on voicemail: yes    Reason for Call: Symptoms or Concerns     If patient has red-flag symptoms, warm transfer to triage line    Current symptom or concern: pt calling regarding calcification in the carotid arteries- pt went to the dentist and she is having pain in her neck area and he took some scans.  The dentist took panaromic images at her yearly appt.today  Her pain is getting worse and worse, she wonders if that is root of the problem?      Symptoms have been present for:  1.5 years(s)    Has patient previously been seen for this? No    By Chiropractor, (PT  For vertigo)    Date: NA    Are there any new or worsening symptoms? No  The left side is worse than the right. It just throbs.      Action Taken: Message routed to:  [facility] ([facility]): primary care

## 2019-08-15 NOTE — TELEPHONE ENCOUNTER
Was at check up and didn't talk about the neck. Went to PT for vertigo. Worked on neck with exercises . No dizziness now. Had dental check up, she told him he might have an abscess. Had left neck pain for a year and half but its got worse. Did an xray of the jaw line, he could see her carotid artery, calcification on both sides. , left side neck pain, also left eye changes blurry. Feels like a knot.  I advised her to not rub it, just leave it alone. Made her an appointment in the NP clinic. Yesika Kaur Paramedic on 8/15/2019 at 2:43 PM

## 2019-08-16 ENCOUNTER — OFFICE VISIT (OUTPATIENT)
Dept: FAMILY MEDICINE | Facility: CLINIC | Age: 79
End: 2019-08-16
Payer: COMMERCIAL

## 2019-08-16 ENCOUNTER — ANCILLARY PROCEDURE (OUTPATIENT)
Dept: ULTRASOUND IMAGING | Facility: CLINIC | Age: 79
End: 2019-08-16
Attending: NURSE PRACTITIONER
Payer: COMMERCIAL

## 2019-08-16 VITALS
SYSTOLIC BLOOD PRESSURE: 134 MMHG | OXYGEN SATURATION: 99 % | DIASTOLIC BLOOD PRESSURE: 74 MMHG | WEIGHT: 138.85 LBS | HEART RATE: 59 BPM | BODY MASS INDEX: 26.05 KG/M2

## 2019-08-16 DIAGNOSIS — R93.89 ABNORMAL CAROTID ULTRASOUND: ICD-10-CM

## 2019-08-16 DIAGNOSIS — I10 ESSENTIAL HYPERTENSION WITH GOAL BLOOD PRESSURE LESS THAN 140/90: ICD-10-CM

## 2019-08-16 DIAGNOSIS — M54.2 NECK PAIN ON LEFT SIDE: Primary | ICD-10-CM

## 2019-08-16 DIAGNOSIS — M54.2 NECK PAIN ON LEFT SIDE: ICD-10-CM

## 2019-08-16 RX ORDER — AMLODIPINE BESYLATE 10 MG/1
10 TABLET ORAL DAILY
Qty: 90 TABLET | Refills: 2 | COMMUNITY
Start: 2019-08-16 | End: 2019-09-10

## 2019-08-16 ASSESSMENT — ENCOUNTER SYMPTOMS
CHILLS: 0
INCREASED ENERGY: 0
SHORTNESS OF BREATH: 0
FATIGUE: 1
NECK PAIN: 1

## 2019-08-16 ASSESSMENT — PAIN SCALES - GENERAL: PAINLEVEL: MODERATE PAIN (4)

## 2019-08-16 NOTE — PATIENT INSTRUCTIONS
First, have ultra sounds of your neck and carotid arteries. Next, apply heat and take analgesics as needed for your pain. Next, the plan of care will be decided upon based on the result of your tests. Please follow up in one week or as appropriate based on the test results. Thank you.   [facility] Medication Refill Request Information:  * Please contact your pharmacy regarding ANY request for medication refills.  ** [facility] Clinic Prescription Fax = [phone]  * Please allow 3 business days for routine medication refills.  * Please allow 5 business days for controlled substance medication refills.     [facility] Test Result notification information:  *You will be notified with in 7-10 days of your appointment day regarding the results of your test.  If you are on MyChart you will be notified as soon as the provider has reviewed the results and signed off on them.    [facility]: [phone]

## 2019-08-16 NOTE — NURSING NOTE
78 year old  Chief Complaint   Patient presents with     Pain     Pt has ongoing pain on neck x1 year       Blood pressure 134/74, pulse 59, weight 63 kg (138 lb 13.6 oz), SpO2 99 %, not currently breastfeeding. Body mass index is 26.05 kg/m .  BP completed using cuff size:      Isabela Payne MA  August 16, 2019 7:26 AM

## 2019-08-16 NOTE — PROGRESS NOTES
"       HPI       Janette Rahman is a 78 year old woman who presents left sided neck pain. She started PT in early June to work on vertigo and neck pain. The pain continues to get worse and worse. Recently, her eyes seem to be changing as well. She feels a knot in her left neck, she used to rub it out and no longer does this. She saw the dentist yesterday, he did a scan and told the patient that she has some calcification in her carotid arteries bilaterally. She did have a reclast infusion this past Monday and she has felt worse after that. She denies chest pain or shortness of breath. She has noticed in the past few weeks that her short term memory has been poor. This seems to happen a few times per week. She also notices when she lays on either side, she feels like she \"blocks the flow\" of her blood, she know it sounds silly, but its how she really feels. She sees Dr. Lopez in primary care. She is generally healthy, she does have hypertension and hyperlipidemia.   Chief Complaint   Patient presents with     Pain     Pt has ongoing pain on neck x1 year         Concern: left sided neck pain, vision changes   Description of the problem :here today for exam and evaluation, has not had any testing on her carotids in the past, is feeling fatigued and has less short term memory, most importantly, the left sided neck pain is becoming debilitating.      Problem, Medication and Allergy Lists were reviewed and updated if needed..    Patient is an established patient of this clinic..           Review of Systems:   Review of Systems     Constitutional:  Positive for fatigue. Negative for chills and increased energy.   Respiratory:   Negative for shortness of breath.    Cardiovascular:  Negative for chest pain.   Musculoskeletal:  Positive for neck pain.               Physical Exam:     Vitals:    08/16/19 0723   BP: 134/74   Pulse: 59   SpO2: 99%   Weight: 63 kg (138 lb 13.6 oz)     Body mass index is 26.05 kg/m .  Vitals " were reviewed and were normal     Physical Exam   Constitutional: She is oriented to person, place, and time. She appears well-developed and well-nourished.   HENT:   Head: Normocephalic.   Neck: Normal range of motion. Neck supple.   Cardiovascular: Normal rate, regular rhythm and normal heart sounds.   Pulmonary/Chest: Effort normal and breath sounds normal. No stridor. No respiratory distress. She has no wheezes.   Musculoskeletal:        Cervical back: She exhibits pain.        Back:    Neck pain on left side starting beneath ear and going to upper back. There is a pea sized nodule as well palpated in left lateral neck.    Neurological: She is alert and oriented to person, place, and time.   Skin: Skin is warm and dry.   Psychiatric: She has a normal mood and affect. Her behavior is normal.          Results:     Impression Carotid US:     1. Right side:         Degree of stenosis of the internal carotid artery: Less than 50 %.     2. Left side:          Degree of stenosis of the internal carotid artery: Less than 50 %.                                                                     Impression Neck US:    Small rounded hypoechoic nodule in the region of concern. Appearance  is nonspecific. Atypical lymph node is a possibility. Six-month  ultrasound follow-up could be considered. Sooner if clinical picture  or symptomatology changes    Assessment and Plan     1. Neck pain on left side  - US Carotid Bilateral; Future  - US Head Neck Soft Tissue; Future    2. Essential hypertension with goal blood pressure less than 140/90    - amLODIPine (NORVASC) 10 MG tablet; Take 1 tablet (10 mg) by mouth daily  Dispense: 90 tablet; Refill: 2      There are no discontinued medications.  First, have ultra sounds of your neck and carotid arteries. Next, apply heat and take analgesics as needed for your pain. Next, the plan of care will be decided upon based on the result of your tests. Please follow up in one week or as  appropriate based on the test results. Thank you. Options for treatment and follow-up care were reviewed with the patient. Janette Rahman  engaged in the decision making process and verbalized understanding of the options discussed and agreed with the final plan.  VERÓNICA Reed, CNP  Addendum 1500: I called and explained the results of both tests to Janette. She will see cardiology first and then return to see me for follow up after this appt or prior to that as needed. Janette will monitor her symptoms and call with any questions or concerns. In regards to the nodule on her neck, she will have a repeat US in 6 months or prior to that if clinical picture changes as recommended. She verbalizes understanding of the plan.   VERÓNICA Reed, CNP

## 2019-08-21 ENCOUNTER — TELEPHONE (OUTPATIENT)
Dept: OTOLARYNGOLOGY | Facility: CLINIC | Age: 79
End: 2019-08-21

## 2019-08-21 ENCOUNTER — TELEPHONE (OUTPATIENT)
Dept: FAMILY MEDICINE | Facility: CLINIC | Age: 79
End: 2019-08-21

## 2019-08-21 DIAGNOSIS — M54.2 NECK PAIN ON LEFT SIDE: Primary | ICD-10-CM

## 2019-08-21 RX ORDER — TRAMADOL HYDROCHLORIDE 50 MG/1
50 TABLET ORAL EVERY 6 HOURS PRN
Qty: 20 TABLET | Refills: 0 | Status: SHIPPED | OUTPATIENT
Start: 2019-08-21 | End: 2019-08-24

## 2019-08-21 NOTE — TELEPHONE ENCOUNTER
[facility] Call Center    Phone Message    May a detailed message be left on voicemail: yes    Reason for Call: Other: Referral received from Beryl Prajapati for a neck nodule that was found on an US.  Please call Janette to schedule     Action Taken: Message routed to:  [facility]

## 2019-08-21 NOTE — TELEPHONE ENCOUNTER
Janette is miserable. Left sided neck pain is worse. She is not sleeping well. She feels that she is getting worse instead of better. She feels defeated. I am ordering an MRI of her C spine. She will take Ultram for pain. She will see ENT. She will follow up with me in one week. She verbalizes understanding of the plan.   Beryl Prajapati, VERÓNICA, CNP

## 2019-08-22 NOTE — TELEPHONE ENCOUNTER
Talked to patient and got her scheduled with next available with Dr. Franco, as there were no appts on 8/27 available.

## 2019-08-22 NOTE — TELEPHONE ENCOUNTER
FUTURE VISIT INFORMATION      FUTURE VISIT INFORMATION:    Date: 9/3/19    Time: 7:45AM    [place] [facility] [place]  REFERRAL INFORMATION:    Referring provider:  Beryl Prajapati NP    Referring providers clinic:  [facility]     Reason for visit/diagnosis  Neck pain on left side     RECORDS REQUESTED FROM:       Clinic name Comments Records Status Imaging Status   [facility] clinic  8/21/19 referral  8/16/19 notes with Beryl Prajapati NP EPIC    [facility] 8/16/19 US Head NEck  [facility] PACS

## 2019-08-28 ENCOUNTER — PATIENT OUTREACH (OUTPATIENT)
Dept: INTERNAL MEDICINE | Facility: CLINIC | Age: 79
End: 2019-08-28

## 2019-08-28 NOTE — PATIENT INSTRUCTIONS
RN reached patient by phone and patient was offered an appt to follow up with Dr. Lopez as PCP had sent message about this.  Patient was grateful for Dr. Lopez' suggestion, and relayed she has been resting and taking CBD oil and OTC analgesics and the pain has improved slightly.  She will call back if she thinks she needs an appt to follow up with PCP.    Sejal Gibson RN on 8/28/2019 at 8:56 AM

## 2019-08-28 NOTE — TELEPHONE ENCOUNTER
RECORDS RECEIVED FROM: Internal   DATE RECEIVED: 9-21   NOTES STATUS DETAILS   OFFICE NOTE from referring provider    Internal 8-16-19 [place]   OFFICE NOTE from other vascular specilists N/A    DISCHARGE SUMMARY from hospital    N/A    DISCHARGE REPORT from the ER   N/A    OPERATIVE REPORT    N/A    MEDICATION LIST   Internal    LABS     Most recent labs within 3 months            (most recent result of each lab test) Internal 8-12-19   IMAGING (DISC & REPORT)      Duplex Ultrasounds   N/A Carotid bilateral 8-16-19   MRI/MRA   Internal 8-30-19   Cath   N/A    CT/CTA Scan   N/A

## 2019-08-30 ENCOUNTER — ANCILLARY PROCEDURE (OUTPATIENT)
Dept: MRI IMAGING | Facility: CLINIC | Age: 79
End: 2019-08-30
Attending: NURSE PRACTITIONER
Payer: COMMERCIAL

## 2019-08-30 DIAGNOSIS — M54.2 NECK PAIN ON LEFT SIDE: ICD-10-CM

## 2019-09-03 ENCOUNTER — TELEPHONE (OUTPATIENT)
Dept: PHYSICAL MEDICINE AND REHAB | Facility: CLINIC | Age: 79
End: 2019-09-03

## 2019-09-03 ENCOUNTER — PRE VISIT (OUTPATIENT)
Dept: OTOLARYNGOLOGY | Facility: CLINIC | Age: 79
End: 2019-09-03

## 2019-09-03 ENCOUNTER — OFFICE VISIT (OUTPATIENT)
Dept: OTOLARYNGOLOGY | Facility: CLINIC | Age: 79
End: 2019-09-03
Attending: NURSE PRACTITIONER
Payer: COMMERCIAL

## 2019-09-03 VITALS
DIASTOLIC BLOOD PRESSURE: 70 MMHG | HEART RATE: 61 BPM | SYSTOLIC BLOOD PRESSURE: 130 MMHG | HEIGHT: 61 IN | WEIGHT: 138 LBS | BODY MASS INDEX: 26.06 KG/M2 | RESPIRATION RATE: 17 BRPM

## 2019-09-03 DIAGNOSIS — M54.2 CERVICALGIA: Primary | ICD-10-CM

## 2019-09-03 ASSESSMENT — PAIN SCALES - GENERAL: PAINLEVEL: EXTREME PAIN (9)

## 2019-09-03 ASSESSMENT — MIFFLIN-ST. JEOR: SCORE: 1059.95

## 2019-09-03 NOTE — PROGRESS NOTES
Otolaryngology Clinic      Name: Janette Rahman  MRN: [id number]  Age: 78 year old  : 1940  Referring provider: Beryl Prajapati  2019      Chief Complaint:  Consult       History of Present Illness:   Janette Rahman is a 78 year old female who presents for evaluation of left-sided neck mass.  The patient reports bilateral neck pain for about a year and a half.  She has tried chiropractic treatment without significant improvement.  In addition, she also has noticed a lump on the left side of her neck.  Ultrasound was done which showed a small hypoechoic nodule.  She also had a MRI of her neck which showed multilevel degenerative changes and neural foraminal narrowing.  She has not yet tried PT for her neck pain.     Active Medications:      atorvastatin (LIPITOR) 20 MG tablet, Take 1 tablet (20 mg) by mouth daily, Disp: 90 tablet, Rfl: 3     cholecalciferol 1000 units TABS, , Disp: , Rfl:      Coenzyme Q10 (COQ-10 PO), Take by mouth daily, Disp: , Rfl:      hydrochlorothiazide (HYDRODIURIL) 25 MG tablet, Take 1 tablet (25 mg) by mouth daily, Disp: 90 tablet, Rfl: 1     LORazepam (ATIVAN) 0.5 MG tablet, Take 1 tablet (0.5 mg) by mouth every 6 hours as needed (vertigo), Disp: 10 tablet, Rfl: 0     losartan (COZAAR) 100 MG tablet, Take 1 tablet (100 mg) by mouth daily, Disp: 90 tablet, Rfl: 3     meclizine (ANTIVERT) 25 MG tablet, Take 1 tablet (25 mg) by mouth every 6 hours as needed for dizziness, Disp: 30 tablet, Rfl: 0     Omega-3 Fatty Acids (OMEGA 3 PO), Take by mouth daily, Disp: , Rfl:      Probiotic Product (PROBIOTIC DAILY PO), Take by mouth daily, Disp: , Rfl:      amLODIPine (NORVASC) 10 MG tablet, Take 1 tablet (10 mg) by mouth daily, Disp: 90 tablet, Rfl: 2      Allergies:   No known drug allergies.       Past Medical History:  Hypertension  Hyperlipidemia  Osteoporosis   Inflamed seborrheic keratosis      Past Surgical History:  Rotator cuff repair 2006  Bunionectomy   Carpal tunnel  "release     Family History:   Coronary artery disease - mother  Hypertension - mother  Stroke - mother  Valvular heart disease - father      Social History:   Presents to clinic with her .  Tobacco Use: No previous or current tobacco use.   Alcohol Use: 3 glasses of wine per week  PCP: Alyssa Lopez     Review of Systems:   Pertinent items are noted in HPI or as in patient entered ROS below, remainder of complete ROS is negative.    ENT ROS 9/3/2019   Constitutional Problems with sleep   Ears, Nose, Throat Ringing/noise in ears, Sore throat   Musculoskeletal Neck pain   Hematologic Lymph node swelling      Physical Exam:   /70   Pulse 61   Resp 17   Ht 1.56 m (5' 1.42\")   Wt 62.6 kg (138 lb)   LMP  (LMP Unknown)   BMI 25.72 kg/m       Constitutional:  The patient was accompanied, well-groomed, and in no acute distress.    Skin:  Warm and pink.    Neurologic:  Alert and oriented x 3.  CN's III-XII within normal limits.  Voice normal.   Psychiatric:  The patient's affect was calm, cooperative, and appropriate.    Respiratory:  Breathing comfortably without stridor or exertion of accessory muscles.    Eyes: Extraocular movement intact.    Head:  Normocephalic and atraumatic.  No lesions or scars.    Neck:  Supple with normal laryngeal and tracheal landmarks.  The parotid beds were without masses.  No palpable thyroid. Trigger point along splenius muscles in the mid-neck in zone 3.  Lymphatic:  There is no palpable lymphadenopathy in the neck.     Neck ultrasound 8/16/19:  Findings:  Targeted sonographic evaluation of the left lateral neck in the region of palpable abnormality. In the region of concern, there is a 0.4 x 0.4 x 0.4 cm round hypoechoic nodule. No internal vascularity.  No other mass lesion or loculated fluid collection.    Impression:    Small rounded hypoechoic nodule in the region of concern. Appearance is nonspecific. Atypical lymph node is a possibility. Six-month " ultrasound follow-up could be considered. Sooner if clinical picture or symptomatology changes    Assessment and Plan:  Janette Rahman is a 78 year old female with a small left neck nodule.  This may be a reactive lymph node.  It is too small to biopsy and has no specific abnormal features.  Will plan to repeat ultrasound in 6 months.    Regarding her ongoing neck pain, will have her see PMR for further evaluation.           Scribe Disclosure:  I, Rachel Mcfarlane, am serving as a scribe to document services personally performed by Brody Franco MD at this visit, based upon the provider's statements to me. All documentation has been reviewed by the aforementioned provider prior to being entered into the official medical record.

## 2019-09-03 NOTE — LETTER
9/3/2019       RE: Janette Rahman  [address]  [address]  [place] [address]     Dear Colleague,    Thank you for referring your patient, Janette Rahman, to the [facility] EAR NOSE AND THROAT at [facility]. Please see a copy of my visit note below.      Otolaryngology Clinic      Name: Janette Rahman  MRN: [id number]  Age: 78 year old  : 1940  Referring provider: Beryl Prajapati  2019      Chief Complaint:  Consult       History of Present Illness:   Janette Rahman is a 78 year old female who presents for evaluation of left-sided neck mass.  The patient reports bilateral neck pain for about a year and a half.  She has tried chiropractic treatment without significant improvement.  In addition, she also has noticed a lump on the left side of her neck.  Ultrasound was done which showed a small hypoechoic nodule.  She also had a MRI of her neck which showed multilevel degenerative changes and neural foraminal narrowing.  She has not yet tried PT for her neck pain.     Active Medications:      atorvastatin (LIPITOR) 20 MG tablet, Take 1 tablet (20 mg) by mouth daily, Disp: 90 tablet, Rfl: 3     cholecalciferol 1000 units TABS, , Disp: , Rfl:      Coenzyme Q10 (COQ-10 PO), Take by mouth daily, Disp: , Rfl:      hydrochlorothiazide (HYDRODIURIL) 25 MG tablet, Take 1 tablet (25 mg) by mouth daily, Disp: 90 tablet, Rfl: 1     LORazepam (ATIVAN) 0.5 MG tablet, Take 1 tablet (0.5 mg) by mouth every 6 hours as needed (vertigo), Disp: 10 tablet, Rfl: 0     losartan (COZAAR) 100 MG tablet, Take 1 tablet (100 mg) by mouth daily, Disp: 90 tablet, Rfl: 3     meclizine (ANTIVERT) 25 MG tablet, Take 1 tablet (25 mg) by mouth every 6 hours as needed for dizziness, Disp: 30 tablet, Rfl: 0     Omega-3 Fatty Acids (OMEGA 3 PO), Take by mouth daily, Disp: , Rfl:      Probiotic Product (PROBIOTIC DAILY PO), Take by mouth daily, Disp: , Rfl:      amLODIPine (NORVASC) 10  "MG tablet, Take 1 tablet (10 mg) by mouth daily, Disp: 90 tablet, Rfl: 2      Allergies:   No known drug allergies.       Past Medical History:  Hypertension  Hyperlipidemia  Osteoporosis   Inflamed seborrheic keratosis      Past Surgical History:  Rotator cuff repair 2/2006  Bunionectomy   Carpal tunnel release     Family History:   Coronary artery disease - mother  Hypertension - mother  Stroke - mother  Valvular heart disease - father      Social History:   Presents to clinic with her .  Tobacco Use: No previous or current tobacco use.   Alcohol Use: 3 glasses of wine per week  PCP: Alyssa Lopez     Review of Systems:   Pertinent items are noted in HPI or as in patient entered ROS below, remainder of complete ROS is negative.    ENT ROS 9/3/2019   Constitutional Problems with sleep   Ears, Nose, Throat Ringing/noise in ears, Sore throat   Musculoskeletal Neck pain   Hematologic Lymph node swelling      Physical Exam:   /70   Pulse 61   Resp 17   Ht 1.56 m (5' 1.42\")   Wt 62.6 kg (138 lb)   LMP  (LMP Unknown)   BMI 25.72 kg/m        Constitutional:  The patient was accompanied, well-groomed, and in no acute distress.    Skin:  Warm and pink.    Neurologic:  Alert and oriented x 3.  CN's III-XII within normal limits.  Voice normal.   Psychiatric:  The patient's affect was calm, cooperative, and appropriate.    Respiratory:  Breathing comfortably without stridor or exertion of accessory muscles.    Eyes: Extraocular movement intact.    Head:  Normocephalic and atraumatic.  No lesions or scars.    Neck:  Supple with normal laryngeal and tracheal landmarks.  The parotid beds were without masses.  No palpable thyroid. Trigger point along splenius muscles in the mid-neck in zone 3.  Lymphatic:  There is no palpable lymphadenopathy in the neck.     Neck ultrasound 8/16/19:  Findings:  Targeted sonographic evaluation of the left lateral neck in the region of palpable abnormality. In the " region of concern, there is a 0.4 x 0.4 x 0.4 cm round hypoechoic nodule. No internal vascularity.  No other mass lesion or loculated fluid collection.    Impression:    Small rounded hypoechoic nodule in the region of concern. Appearance is nonspecific. Atypical lymph node is a possibility. Six-month ultrasound follow-up could be considered. Sooner if clinical picture or symptomatology changes    Assessment and Plan:  Janette Rahman is a 78 year old female with a small left neck nodule.  This may be a reactive lymph node.  It is too small to biopsy and has no specific abnormal features.  Will plan to repeat ultrasound in 6 months.    Regarding her ongoing neck pain, will have her see PMR for further evaluation.           Scribe Disclosure:  I, Rachel Maeve, am serving as a scribe to document services personally performed by Brody Franco MD at this visit, based upon the provider's statements to me. All documentation has been reviewed by the aforementioned provider prior to being entered into the official medical record.    Again, thank you for allowing me to participate in the care of your patient.      Sincerely,    Brody Franco MD

## 2019-09-03 NOTE — PATIENT INSTRUCTIONS
1. You were seen in the ENT Clinic today by Dr. Franco.  If you have any questions or concerns after your appointment, please call   - Option 1: ENT Clinic: [phone]  - Option 2: [facility] (Dr. Franco's Nurse): [phone]    2.   Plan to return for a neck ultrasound upon arriving back from [place]     3. Physical medicine referral has been ordered; please schedule on your way out today or call the number listed in your paperwork     Natice Schwab, RN  [facility] Otolaryngology  [phone]

## 2019-09-03 NOTE — TELEPHONE ENCOUNTER
"[facility] Call Center    Phone Message    May a detailed message be left on voicemail: yes    Reason for Call: Other: Calling to schedule New Patient Referral.  Referral can be found in the chart under \"Other Orders.\"  From Dr. Franco in ENT for cerrvical arthritis On  and trigger points.     Action Taken: Message routed to:  [facility] david warren    "

## 2019-09-03 NOTE — NURSING NOTE
"Chief Complaint   Patient presents with     Consult     neck nodule      Blood pressure 130/70, pulse 61, resp. rate 17, height 1.56 m (5' 1.42\"), weight 62.6 kg (138 lb), not currently breastfeeding.    Neel Dunne LPN    "

## 2019-09-04 ENCOUNTER — TELEPHONE (OUTPATIENT)
Dept: PHYSICAL MEDICINE AND REHAB | Facility: CLINIC | Age: 79
End: 2019-09-04

## 2019-09-04 ENCOUNTER — TELEPHONE (OUTPATIENT)
Dept: FAMILY MEDICINE | Facility: CLINIC | Age: 79
End: 2019-09-04

## 2019-09-04 NOTE — TELEPHONE ENCOUNTER
Left voicemail letting pt know Beryl Prajapati can referred her to pain management or a follow up appointment can be made regarding her pain. A number was left for pt to call back.     Isabela Payne MA 5:38pm  9/4/2019

## 2019-09-04 NOTE — TELEPHONE ENCOUNTER
[facility] Call Center    Phone Message    May a detailed message be left on voicemail: yes    Reason for Call: Other: Janette calling again to get scheduled based on her referral to PM&R.  Please call her back asap to get something scheduled for her.      Action Taken: Message routed to:  [facility]): PM&R

## 2019-09-04 NOTE — TELEPHONE ENCOUNTER
----- Message from Natice Schwab, RN sent at 9/3/2019  2:09 PM CDT -----  Could someone contact this patient regarding scheduling. Referral placed under miscellaneous referral and all information listed. Thanks much    Harsha

## 2019-09-04 NOTE — TELEPHONE ENCOUNTER
[facility] Call Center    Phone Message    May a detailed message be left on voicemail: yes    Reason for Call: Symptoms or Concerns     If patient has red-flag symptoms, warm transfer to triage line    Current symptom or concern: Patient called and is still having significant pain in her neck, she stated she is not scheduled with PMR but the appointment wouldn't be until October 31st.     She has concerns that this may be too far out for her to be seen and would like to discuss options.    Her  and herself usually leave October 29th to go out of town for the winter so this would be too long to wait.           Action Taken: Message routed to:  [facility] NP

## 2019-09-05 NOTE — TELEPHONE ENCOUNTER
Patient called to arrange an appointment. She is leaving in 6 weeks for [place] and will be staying there about 6 months. She would like to have PT before she leaves and feels this helps her the most. Advised that she call her Primary Provider for this request. Patient chooses not to follow with PM&R at this time.

## 2019-09-05 NOTE — TELEPHONE ENCOUNTER
Is leaving for [place] in 6 weeks and stays there until spring. Is interested in seeing a provider to order PT. Advised patient that she should see her Primary Provider to discuss this further. Patient chooses not to see PM&R due to time frame for next available appointment and not able to follow up with recommendations

## 2019-09-10 ENCOUNTER — MYC REFILL (OUTPATIENT)
Dept: INTERNAL MEDICINE | Facility: CLINIC | Age: 79
End: 2019-09-10

## 2019-09-10 DIAGNOSIS — I10 ESSENTIAL HYPERTENSION WITH GOAL BLOOD PRESSURE LESS THAN 140/90: ICD-10-CM

## 2019-09-10 RX ORDER — AMLODIPINE BESYLATE 10 MG/1
10 TABLET ORAL DAILY
Qty: 90 TABLET | Refills: 2 | Status: SHIPPED | OUTPATIENT
Start: 2019-09-10 | End: 2020-05-01

## 2019-09-10 RX ORDER — HYDROCHLOROTHIAZIDE 25 MG/1
25 TABLET ORAL DAILY
Qty: 90 TABLET | Refills: 2 | Status: SHIPPED | OUTPATIENT
Start: 2019-09-10 | End: 2020-05-01

## 2019-09-10 NOTE — TELEPHONE ENCOUNTER
Last Clinic Visit: 6/14/2019  [facility]    Outside facility Creat done 8-12-19 - 0.66

## 2019-09-21 ENCOUNTER — PRE VISIT (OUTPATIENT)
Dept: CARDIOLOGY | Facility: CLINIC | Age: 79
End: 2019-09-21

## 2019-09-24 ENCOUNTER — TELEPHONE (OUTPATIENT)
Dept: OTOLARYNGOLOGY | Facility: CLINIC | Age: 79
End: 2019-09-24

## 2019-09-24 NOTE — TELEPHONE ENCOUNTER
Voicemail received asking for call-back regarding ongoing neck pain. Phone call returned    Janette explained that she has been experiencing intolerable neck pain since her last appointment with . She was unable to get in with PM&R so she had her primary send a referral for PT. She's completed 6 sessions without relief.     I stated that  had wanted her to be seen by PM&R for further evaluation. Message sent to care coordinator and appointment obtained tomorrow.     Janette plans to attend scheduled appointment and contact me on my direct line with additional questions/concerns.     Natice Schwab, RN BSN

## 2019-09-25 ENCOUNTER — OFFICE VISIT (OUTPATIENT)
Dept: PHYSICAL MEDICINE AND REHAB | Facility: CLINIC | Age: 79
End: 2019-09-25
Payer: COMMERCIAL

## 2019-09-25 VITALS
RESPIRATION RATE: 16 BRPM | SYSTOLIC BLOOD PRESSURE: 136 MMHG | BODY MASS INDEX: 26.06 KG/M2 | DIASTOLIC BLOOD PRESSURE: 65 MMHG | HEIGHT: 61 IN | OXYGEN SATURATION: 97 % | WEIGHT: 138 LBS | HEART RATE: 57 BPM

## 2019-09-25 DIAGNOSIS — M79.18 MYOFASCIAL PAIN: Primary | ICD-10-CM

## 2019-09-25 DIAGNOSIS — M47.812 SPONDYLOSIS OF CERVICAL REGION WITHOUT MYELOPATHY OR RADICULOPATHY: ICD-10-CM

## 2019-09-25 DIAGNOSIS — G89.29 CHRONIC NECK PAIN: ICD-10-CM

## 2019-09-25 DIAGNOSIS — M54.2 CHRONIC NECK PAIN: ICD-10-CM

## 2019-09-25 RX ORDER — AMITRIPTYLINE HYDROCHLORIDE 10 MG/1
10 TABLET ORAL AT BEDTIME
Qty: 30 TABLET | Refills: 3 | Status: SHIPPED | OUTPATIENT
Start: 2019-09-25 | End: 2020-07-06

## 2019-09-25 RX ORDER — IBUPROFEN 200 MG
400 TABLET ORAL EVERY 4 HOURS PRN
COMMUNITY

## 2019-09-25 ASSESSMENT — PAIN SCALES - GENERAL: PAINLEVEL: SEVERE PAIN (6)

## 2019-09-25 ASSESSMENT — MIFFLIN-ST. JEOR: SCORE: 1060

## 2019-09-25 NOTE — NURSING NOTE
Chief Complaint   Patient presents with     Consult     [facility] NEW- CONSULT NECK PAIN       Den Hernández, EMT

## 2019-09-25 NOTE — LETTER
RE: Janette Rahman  [address]  [address]  [place] [address]     Dear Colleague,    Thank you for referring your patient, Janette Rahman, to the [facility] PHYSICAL MEDICINE AND REHABILITATION at [facility]. Please see a copy of my visit note below.    Service Date: 09/25/2019      INTERVAL HISTORY:  Janette Rahman has been referred to the Rehab Clinic for chronic left-sided neck pain.      Janette states that the neck pain came on about a year and a half ago.  She can think of no reason for it to come.  For instance, there was no injury, no illness, etc., just kind of came on.  It has slowly gotten worse.  She notes knots in the muscles on the left side of the neck.  She points to the left upper trapezius area that are very sore and when she stretches those or when she turns her neck she notes increasing pain in that area.  She denies any pain to shoot down the arm.  She denies numbness or tingling in the arms.  She denies weakness in the arms.  She notes that because of the pain she is not sleeping very well either.  The pain is located primarily on the left side of the neck.  On a 0-10 scale, she reports the least pain is 2/10, the worst pain is 10+/10.  While being seen here in clinic today, she notes the pain at a 7 level.      She notes the pain increases if she stretches the muscles on the left side of the neck.  Gentle massage seems to be helpful.      She has had 6 sessions of physical therapy at a therapist in [place], but has really noted no benefit from that.  She was given a prescription for tramadol for pain, but she found that really was not of help and she is not taking that.      She reports that her health is otherwise quite good.      She does note she is under some stress as her  does have a mild case of Alzheimer's, but she does need to help in that regard and that is causing some stress.      The patient did have an MRI  of the cervical spine on 08/30 of this year.  The MRI showed multi levels of anterolisthesis and retrolisthesis as detailed in the report, multiple levels of cervical spondylosis with multilevel neural foraminal narrowing noted, but no significant spinal canal stenosis.  There was isolated focal dilatation of the central canal of the cervical cord at level C4 measuring 1.2 mm, but no evidence for mass lesion according to the MRI report.  Reviewing the report, there is no evidence of any radiculopathy and the patient denies any evidence for radiculopathy.  She also denies any problems with bowels or bladder, ambulation, etc.      PHYSICAL EXAMINATION:  Ms. Rahman is a delightful 78-year-old woman, alert, oriented and cooperative.   VITAL SIGNS:  Blood pressure 136/65.  Pulse 57.  Respirations 16.  Height 1 meter 56 cm.  Weight 60.6 kg.   She does have decreased range of motion of cervical spine.  She does note with lateral bending to the right, rotation to the right does cause some increase in pain in the musculature on the left side of the neck.  She denies any radiating pain in the arms.  Sensation in both upper limbs is normal.  Strength is grossly normal.  Palpatory examination actually reveals significant tightness of paracervical musculature, upper trapezius muscles bilaterally; however, the left side is what is symptomatic for the patient.       ASSESSMENT:  The patient is having chronic neck pain.  It appears to be primarily myofascial in nature.  This is also keeping her from sleeping well, which I think is a part of the problem.  I discussed with the patient that probably a cause for the myofascial pain is the degenerative changes of cervical spine.  The muscles are just being tight but now she has had the myofascial pain which is quite symptomatic for her.      I do believe the patient could be benefited with a different type of physical therapy.  I will be sending her to [facility] in [place].      I  have also given her a prescription for amitriptyline as a trial.  She will start at either 5 or 10 mg at bedtime.  She will take that for 2 or 3 weeks.  If she notes no benefit and has no significant adverse side effect, she could increase that from 5-10 or 10-20 mg.  She might need to titrate to a higher dose.  I will leave that up to her primary care physician here in the [facility] system.      I gave her a prescription for 10 mg of amitriptyline taken every night at bedtime.  Again, I gave her a prescription for 30 tablets with 3 refills.  The patient may need to be titrated up on that, but I would defer to her primary care physician.      The patient reports that she and her  will be going to [place] for the winter leaving near the end of October.  We will get her started in therapy.  She might also need to continue with therapy when she gets to [place].  I would be happy to see the patient in followup in clinic, but again she is going to be out of state until the springtime.      Total time spent with the patient and her , over 50 minutes.  Return to this clinic on an as-needed basis.      Mariano Moraes MD

## 2019-09-25 NOTE — NURSING NOTE
Physical therapy orders from Dr Moraes were faxed to [facility] in [place] at [phone]. Patient was given a copy of the orders and will call to schedule.

## 2019-09-25 NOTE — PROGRESS NOTES
Service Date: 09/25/2019      INTERVAL HISTORY:  Janette Rahman has been [facility] for chronic left-sided neck pain.      Janette states that the neck pain came on about a year and a half ago.  She can think of no reason for it to come.  For instance, there was no injury, no illness, etc., just kind of came on.  It has slowly gotten worse.  She notes knots in the muscles on the left side of the neck.  She points to the left upper trapezius area that are very sore and when she stretches those or when she turns her neck she notes increasing pain in that area.  She denies any pain to shoot down the arm.  She denies numbness or tingling in the arms.  She denies weakness in the arms.  She notes that because of the pain she is not sleeping very well either.  The pain is located primarily on the left side of the neck.  On a 0-10 scale, she reports the least pain is 2/10, the worst pain is 10+/10.  While being seen here in clinic today, she notes the pain at a 7 level.      She notes the pain increases if she stretches the muscles on the left side of the neck.  Gentle massage seems to be helpful.      She has had 6 sessions of physical therapy at a therapist in [place], but has really noted no benefit from that.  She was given a prescription for tramadol for pain, but she found that really was not of help and she is not taking that.      She reports that her health is otherwise quite good.      She does note she is under some stress as her  does have a mild case of Alzheimer's, but she does need to help in that regard and that is causing some stress.      The patient did have an MRI of the cervical spine on 08/30 of this year.  The MRI showed multi levels of anterolisthesis and retrolisthesis as detailed in the report, multiple levels of cervical spondylosis with multilevel neural foraminal narrowing noted, but no significant spinal canal stenosis.  There was isolated focal dilatation of the  central canal of the cervical cord at level C4 measuring 1.2 mm, but no evidence for mass lesion according to the MRI report.  Reviewing the report, there is no evidence of any radiculopathy and the patient denies any evidence for radiculopathy.  She also denies any problems with bowels or bladder, ambulation, etc.      PHYSICAL EXAMINATION:  Ms. Rahman is a delightful 78-year-old woman, alert, oriented and cooperative.   VITAL SIGNS:  Blood pressure 136/65.  Pulse 57.  Respirations 16.  Height 1 meter 56 cm.  Weight 60.6 kg.   She does have decreased range of motion of cervical spine.  She does note with lateral bending to the right, rotation to the right does cause some increase in pain in the musculature on the left side of the neck.  She denies any radiating pain in the arms.  Sensation in both upper limbs is normal.  Strength is grossly normal.  Palpatory examination actually reveals significant tightness of paracervical musculature, upper trapezius muscles bilaterally; however, the left side is what is symptomatic for the patient.       ASSESSMENT:  The patient is having chronic neck pain.  It appears to be primarily myofascial in nature.  This is also keeping her from sleeping well, which I think is a part of the problem.  I discussed with the patient that probably a cause for the myofascial pain is the degenerative changes of cervical spine.  The muscles are just being tight but now she has had the myofascial pain which is quite symptomatic for her.      I do believe the patient could be benefited with a different type of physical therapy.  I will be sending her to [facility] in [place].      I have also given her a prescription for amitriptyline as a trial.  She will start at either 5 or 10 mg at bedtime.  She will take that for 2 or 3 weeks.  If she notes no benefit and has no significant adverse side effect, she could increase that from 5-10 or 10-20 mg.  She might need to titrate to a higher dose.  I  will leave that up to her primary care physician here in the [facility] system.      I gave her a prescription for 10 mg of amitriptyline taken every night at bedtime.  Again, I gave her a prescription for 30 tablets with 3 refills.  The patient may need to be titrated up on that, but I would defer to her primary care physician.      The patient reports that she and her  will be going to [place] for the winter leaving near the end of October.  We will get her started in therapy.  She might also need to continue with therapy when she gets to [place].  I would be happy to see the patient in followup in clinic, but again she is going to be out of state until the springtime.      Total time spent with the patient and her , over 50 minutes.  Return to this clinic on an as-needed basis.      MD LIBERTAD Calderon MD             D: 2019   T: 2019   MT: COLIN      Name:     MAGALYS REGALADO   MRN:      [id number]        Account:      [id number]   :      1940           Service Date: 2019      Document: [id number]

## 2019-09-30 ENCOUNTER — MEDICAL CORRESPONDENCE (OUTPATIENT)
Dept: HEALTH INFORMATION MANAGEMENT | Facility: CLINIC | Age: 79
End: 2019-09-30

## 2019-11-18 ENCOUNTER — TRANSFERRED RECORDS (OUTPATIENT)
Dept: HEALTH INFORMATION MANAGEMENT | Facility: CLINIC | Age: 79
End: 2019-11-18

## 2020-03-02 ENCOUNTER — HEALTH MAINTENANCE LETTER (OUTPATIENT)
Age: 80
End: 2020-03-02

## 2020-03-03 DIAGNOSIS — I10 ESSENTIAL HYPERTENSION WITH GOAL BLOOD PRESSURE LESS THAN 140/90: ICD-10-CM

## 2020-03-05 NOTE — TELEPHONE ENCOUNTER
losartan (COZAAR) 100 MG tablet      Last Written Prescription Date:  7/9/19  Last Fill Quantity: 90,   # refills: 3  Last Office Visit : 6/14/19  Future Office visit:  none    Routing refill request to provider for review/approval because:    Abnormal K+  Lab Test 06/14/19  1143   POTASSIUM 3.2*

## 2020-03-06 RX ORDER — LOSARTAN POTASSIUM 100 MG/1
TABLET ORAL
Qty: 90 TABLET | Refills: 0 | Status: SHIPPED | OUTPATIENT
Start: 2020-03-06 | End: 2020-05-01

## 2020-05-05 ENCOUNTER — TELEPHONE (OUTPATIENT)
Dept: INTERNAL MEDICINE | Facility: CLINIC | Age: 80
End: 2020-05-05

## 2020-05-05 NOTE — TELEPHONE ENCOUNTER
[facility] Call Center    Phone Message    May a detailed message be left on voicemail: yes     Reason for Call: Other: Please call pt back . Thank you.     Action Taken: Message routed to:  [facility]    Travel Screening: Not Applicable

## 2020-05-08 NOTE — TELEPHONE ENCOUNTER
I called and left VM letting the patient know about Dr. Lopez message.   Flory Watson, EMT at 9:58 AM on 5/8/2020.

## 2020-05-31 DIAGNOSIS — I10 ESSENTIAL HYPERTENSION WITH GOAL BLOOD PRESSURE LESS THAN 140/90: ICD-10-CM

## 2020-06-03 RX ORDER — HYDROCHLOROTHIAZIDE 25 MG/1
25 TABLET ORAL DAILY
Qty: 90 TABLET | Refills: 0 | Status: SHIPPED | OUTPATIENT
Start: 2020-06-03 | End: 2020-07-06

## 2020-06-03 RX ORDER — AMLODIPINE BESYLATE 10 MG/1
10 TABLET ORAL DAILY
Qty: 90 TABLET | Refills: 0 | Status: SHIPPED | OUTPATIENT
Start: 2020-06-03 | End: 2020-07-06

## 2020-06-03 NOTE — TELEPHONE ENCOUNTER
Pharmacy change      Scheduling has been notified to contact the pt for appointment.    K+ L  Notes recorded by Alyssa Lopez MD on 6/14/2019 at 3:09 PM CDT

## 2020-07-06 ENCOUNTER — OFFICE VISIT (OUTPATIENT)
Dept: FAMILY MEDICINE | Facility: CLINIC | Age: 80
End: 2020-07-06
Payer: COMMERCIAL

## 2020-07-06 VITALS
WEIGHT: 138 LBS | RESPIRATION RATE: 11 BRPM | OXYGEN SATURATION: 98 % | TEMPERATURE: 98 F | HEART RATE: 60 BPM | DIASTOLIC BLOOD PRESSURE: 82 MMHG | SYSTOLIC BLOOD PRESSURE: 128 MMHG | HEIGHT: 61 IN | BODY MASS INDEX: 26.06 KG/M2

## 2020-07-06 DIAGNOSIS — N28.1 RENAL CYST: ICD-10-CM

## 2020-07-06 DIAGNOSIS — R22.1 LUMP IN NECK: ICD-10-CM

## 2020-07-06 DIAGNOSIS — Z13.6 CARDIOVASCULAR SCREENING; LDL GOAL LESS THAN 100: Primary | ICD-10-CM

## 2020-07-06 DIAGNOSIS — Z00.00 ROUTINE HISTORY AND PHYSICAL EXAMINATION OF ADULT: ICD-10-CM

## 2020-07-06 DIAGNOSIS — I10 ESSENTIAL HYPERTENSION WITH GOAL BLOOD PRESSURE LESS THAN 140/90: ICD-10-CM

## 2020-07-06 DIAGNOSIS — R30.0 DYSURIA: ICD-10-CM

## 2020-07-06 LAB
ALT SERPL W P-5'-P-CCNC: 19 U/L (ref 0–50)
ANION GAP SERPL CALCULATED.3IONS-SCNC: 5 MMOL/L (ref 3–14)
BUN SERPL-MCNC: 12 MG/DL (ref 7–30)
CALCIUM SERPL-MCNC: 9.2 MG/DL (ref 8.5–10.1)
CHLORIDE SERPL-SCNC: 99 MMOL/L (ref 94–109)
CHOLEST SERPL-MCNC: 335 MG/DL
CO2 SERPL-SCNC: 30 MMOL/L (ref 20–32)
CREAT SERPL-MCNC: 0.73 MG/DL (ref 0.52–1.04)
GFR SERPL CREATININE-BSD FRML MDRD: 79 ML/MIN/{1.73_M2}
GLUCOSE SERPL-MCNC: 93 MG/DL (ref 70–99)
HDLC SERPL-MCNC: 69 MG/DL
LDLC SERPL CALC-MCNC: 244 MG/DL
NONHDLC SERPL-MCNC: 266 MG/DL
POTASSIUM SERPL-SCNC: 2.9 MMOL/L (ref 3.4–5.3)
SODIUM SERPL-SCNC: 134 MMOL/L (ref 133–144)
TRIGL SERPL-MCNC: 110 MG/DL

## 2020-07-06 PROCEDURE — 36415 COLL VENOUS BLD VENIPUNCTURE: CPT | Performed by: NURSE PRACTITIONER

## 2020-07-06 PROCEDURE — 80048 BASIC METABOLIC PNL TOTAL CA: CPT | Performed by: NURSE PRACTITIONER

## 2020-07-06 PROCEDURE — 80061 LIPID PANEL: CPT | Performed by: NURSE PRACTITIONER

## 2020-07-06 PROCEDURE — 84460 ALANINE AMINO (ALT) (SGPT): CPT | Performed by: NURSE PRACTITIONER

## 2020-07-06 PROCEDURE — 99397 PER PM REEVAL EST PAT 65+ YR: CPT | Performed by: NURSE PRACTITIONER

## 2020-07-06 PROCEDURE — 99213 OFFICE O/P EST LOW 20 MIN: CPT | Mod: 25 | Performed by: NURSE PRACTITIONER

## 2020-07-06 RX ORDER — LOSARTAN POTASSIUM 100 MG/1
100 TABLET ORAL DAILY
Qty: 90 TABLET | Refills: 3 | Status: SHIPPED | OUTPATIENT
Start: 2020-07-06 | End: 2021-07-19

## 2020-07-06 RX ORDER — AMLODIPINE BESYLATE 10 MG/1
10 TABLET ORAL DAILY
Qty: 90 TABLET | Refills: 3 | Status: SHIPPED | OUTPATIENT
Start: 2020-07-06 | End: 2021-07-08

## 2020-07-06 RX ORDER — HYDROCHLOROTHIAZIDE 25 MG/1
25 TABLET ORAL DAILY
Qty: 90 TABLET | Refills: 3 | Status: SHIPPED | OUTPATIENT
Start: 2020-07-06 | End: 2020-07-06 | Stop reason: SINTOL

## 2020-07-06 ASSESSMENT — MIFFLIN-ST. JEOR: SCORE: 1044.37

## 2020-07-06 NOTE — PATIENT INSTRUCTIONS
Thank you for choosing [facility].  You may be receiving an email and/or telephone survey request from [facility] Customer Experience regarding your visit today.  Please take a few minutes to respond to the survey to let us know how we are doing.      If you have questions or concerns, please contact us via ideasoft or you can contact your care team at [phone].    Our Clinic hours are:  Monday 6:40 am  to 7:00 pm  Tuesday -Friday 6:40 am to 5:00 pm    The [place] outpatient lab hours are:  Monday - Friday 6:10 am to 4:45 pm  Saturdays 7:00 am to 11:00 am  Appointments are required, call [phone]    If you have clinical questions after hours or would like to schedule an appointment,  call the clinic at [phone].

## 2020-07-06 NOTE — PROGRESS NOTES
SUBJECTIVE:   Janette Rahman is a 79 year old female who presents for Preventive Visit.      Are you in the first 12 months of your Medicare coverage?  No    HPI  Do you feel safe in your environment? Yes    Have you ever done Advance Care Planning? (For example, a Health Directive, POLST, or a discussion with a medical provider or your loved ones about your wishes): Yes, advance care planning is on file.      Fall risk       Cognitive Screening   1) Repeat 3 items (Leader, Season, Table)    2) Clock draw: NORMAL  3) 3 item recall: Recalls 3 objects  Results: NORMAL clock, 1-2 items recalled: COGNITIVE IMPAIRMENT LESS LIKELY    Mini-CogTM Copyright S Tom. Licensed by the author for use in [facility]; reprinted with permission (aide@[facility]). All rights reserved.      Do you have sleep apnea, excessive snoring or daytime drowsiness?: no    Reviewed and updated as needed this visit by clinical staff         Reviewed and updated as needed this visit by Provider        Social History     Tobacco Use     Smoking status: Never Smoker     Smokeless tobacco: Never Used   Substance Use Topics     Alcohol use: Not Currently     Alcohol/week: 1.7 - 2.5 standard drinks     Comment: 3 glasses per week     If you drink alcohol do you typically have >3 drinks per day or >7 drinks per week? No    No flowsheet data found.     Patient had a renal ultrasound in [place] for slow urine stream and frequency at night- ultrasound showed a cyst in the right kidney - was recommended to follow up with CT scan.   Symptoms started 7  Months ago . Does not leak urine.     Hyperlipidemia Follow-Up      Are you regularly taking any medication or supplement to lower your cholesterol?   No- patient stopped taking Lipitor due to dry cough - stopped medication 3-6 months ago.     Are you having muscle aches or other side effects that you think could be caused by your cholesterol lowering medication?  Yes- made her cough so she has  been off for 3-4 months and cough has improved    Hypertension Follow-up      Do you check your blood pressure regularly outside of the clinic? Yes     Are you following a low salt diet? Yes    Are your blood pressures ever more than 140 on the top number (systolic) OR more   than 90 on the bottom number (diastolic), for example 140/90? No      How many servings of fruits and vegetables do you eat daily?  2-3    On average, how many sweetened beverages do you drink each day (Examples: soda, juice, sweet tea, etc.  Do NOT count diet or artificially sweetened beverages)?   0    How many days per week do you exercise enough to make your heart beat faster? 3 or less    How many minutes a day do you exercise enough to make your heart beat faster? 9 or less  How many days per week do you miss taking your medication? none    What makes it hard for you to take your medications?  side effects       Patient wants to discuss her Renal Ultrasound done in 2019, she had a cyst in her right kidney. She is still having slow urine stream and wants to discuss    Also saw an ENT through [facility] 9/2019 for soreness on the back her neck, still have pain- was recommended to follow up with repeat ultrasound. Lump is dime size- has not grown- is painful at times.     Current providers sharing in care for this patient include:   Patient Care Team:  Alyssa Lopez MD as PCP - General (Internal Medicine)  Mariya Jordan MD as MD (Internal Medicine)  Kuldip Yeh MD as Medical Student (Student in organized health care education/training program)  Kuldip Yeh MD as Medical Student (Student in organized health care education/training program)  Stephon Quan MD as MD (Family Practice)  Meka Chris MD as MD (Internal Medicine)  Radha Enamorado MD as Medical Student (Student in organized health care education/training program)  Shaan Root MD as MD (Dermapathology)  Alyssa Lopez  "MD Mohini as MD (Internal Medicine)  Nnamdi Bullard MD as MD (Colon and Rectal Surgery)  Sejal Gibson, RN as Registered Nurse  Alyssa Lopez MD as Assigned PCP    The following health maintenance items are reviewed in Epic and correct as of today:  Health Maintenance   Topic Date Due     ADVANCE CARE PLANNING  1940     ZOSTER IMMUNIZATION (2 of 3) 12/31/2013     PHQ-2  01/01/2020     BMP  06/14/2020     LIPID  06/14/2020     MEDICARE ANNUAL WELLNESS VISIT  06/14/2020     INFLUENZA VACCINE (1) 09/01/2020     FALL RISK ASSESSMENT  09/25/2020     MAMMO SCREENING  06/14/2021     DEXA  06/14/2022     DTAP/TDAP/TD IMMUNIZATION (3 - Td) 06/08/2028     PNEUMOCOCCAL IMMUNIZATION 65+ LOW/MEDIUM RISK  Completed     IPV IMMUNIZATION  Aged Out     MENINGITIS IMMUNIZATION  Aged Out     BP Readings from Last 3 Encounters:   07/06/20 128/82   09/25/19 136/65   09/03/19 130/70    Wt Readings from Last 3 Encounters:   07/06/20 62.6 kg (138 lb)   09/25/19 62.6 kg (138 lb)   09/03/19 62.6 kg (138 lb)                  Pneumonia Vaccine:Adults age 65+ who received Pneumovax (PPSV23) at 65 years or older: Should be given PCV13 > 1 year after their most recent PPSV23  Mammogram Screening: Patient over age 75, has elected to continue with mammography screening.    Review of Systems  Constitutional, HEENT, cardiovascular, pulmonary, GI, , musculoskeletal, neuro, skin, endocrine and psych systems are negative, except as otherwise noted.    OBJECTIVE:   LMP  (LMP Unknown)  Estimated body mass index is 25.72 kg/m  as calculated from the following:    Height as of 9/25/19: 1.56 m (5' 1.42\").    Weight as of 9/25/19: 62.6 kg (138 lb).  Physical Exam  GENERAL: healthy, alert and no distress  NECK: no adenopathy, no asymmetry, masses, or scars and thyroid normal to palpation  RESP: lungs clear to auscultation - no rales, rhonchi or wheezes  CV: regular rate and rhythm, normal S1 S2, no S3 or S4, no murmur, click or rub, no " "peripheral edema and peripheral pulses strong  ABDOMEN: soft, nontender, no hepatosplenomegaly, no masses and bowel sounds normal  MS: no gross musculoskeletal defects noted, no edema    Diagnostic Test Results:  Labs reviewed in Epic    ASSESSMENT / PLAN:   1. Routine history and physical examination of adult      2. Essential hypertension with goal blood pressure less than 140/90  controlled  - amLODIPine (NORVASC) 10 MG tablet; Take 1 tablet (10 mg) by mouth daily  Dispense: 90 tablet; Refill: 3  - losartan (COZAAR) 100 MG tablet; Take 1 tablet (100 mg) by mouth daily  Dispense: 90 tablet; Refill: 3  - hydrochlorothiazide (HYDRODIURIL) 25 MG tablet; Take 1 tablet (25 mg) by mouth daily  Dispense: 90 tablet; Refill: 3  - **Basic metabolic panel FUTURE anytime; Future  - **Basic metabolic panel FUTURE anytime    3. CARDIOVASCULAR SCREENING; LDL GOAL LESS THAN 100    - Lipid panel reflex to direct LDL Fasting; Future  - **ALT FUTURE anytime; Future  - **ALT FUTURE anytime  - Lipid panel reflex to direct LDL Fasting    4. Dysuria  Likely OAB- discussed medication- patient declined  - UROLOGY ADULT REFERRAL    5. Lump in neck  Patient previously saw ENT 6 months ago- recommended 6 month follow up   - US Head Neck Soft Tissue; Future    6. Renal cyst  Noted on ultrasound in [place]- recommended CT scan for follow up   - CT Abdomen Pelvis w Contrast; Future    COUNSELING:  Reviewed preventive health counseling, as reflected in patient instructions       Regular exercise       Healthy diet/nutrition       Osteoporosis Prevention/Bone Health    Estimated body mass index is 25.72 kg/m  as calculated from the following:    Height as of 9/25/19: 1.56 m (5' 1.42\").    Weight as of 9/25/19: 62.6 kg (138 lb).    Weight management plan: Discussed healthy diet and exercise guidelines     reports that she has never smoked. She has never used smokeless tobacco.      Appropriate preventive services were discussed with this patient, " including applicable screening as appropriate for cardiovascular disease, diabetes, osteopenia/osteoporosis, and glaucoma.  As appropriate for age/gender, discussed screening for colorectal cancer, prostate cancer, breast cancer, and cervical cancer. Checklist reviewing preventive services available has been given to the patient.    Reviewed patients plan of care and provided an AVS. The Basic Care Plan (routine screening as documented in Health Maintenance) for Janette meets the Care Plan requirement. This Care Plan has been established and reviewed with the Patient.    Counseling Resources:  ATP IV Guidelines  Pooled Cohorts Equation Calculator  Breast Cancer Risk Calculator  FRAX Risk Assessment  ICSI Preventive Guidelines  Dietary Guidelines for Americans, 2010  USDA's MyPlate  ASA Prophylaxis  Lung CA Screening    VERÓNICA Aguilar CNP  [facility]    Identified Health Risks:

## 2020-07-15 ENCOUNTER — HOSPITAL ENCOUNTER (OUTPATIENT)
Dept: CT IMAGING | Facility: CLINIC | Age: 80
End: 2020-07-15
Attending: NURSE PRACTITIONER
Payer: COMMERCIAL

## 2020-07-15 ENCOUNTER — HOSPITAL ENCOUNTER (OUTPATIENT)
Dept: ULTRASOUND IMAGING | Facility: CLINIC | Age: 80
End: 2020-07-15
Attending: NURSE PRACTITIONER
Payer: COMMERCIAL

## 2020-07-15 DIAGNOSIS — R22.1 LUMP IN NECK: ICD-10-CM

## 2020-07-15 DIAGNOSIS — N28.1 RENAL CYST: ICD-10-CM

## 2020-07-15 PROCEDURE — 25000128 H RX IP 250 OP 636: Performed by: RADIOLOGY

## 2020-07-15 PROCEDURE — 76536 US EXAM OF HEAD AND NECK: CPT

## 2020-07-15 PROCEDURE — 74170 CT ABD WO CNTRST FLWD CNTRST: CPT

## 2020-07-15 PROCEDURE — 25000125 ZZHC RX 250: Performed by: RADIOLOGY

## 2020-07-15 RX ORDER — IOPAMIDOL 755 MG/ML
67 INJECTION, SOLUTION INTRAVASCULAR ONCE
Status: COMPLETED | OUTPATIENT
Start: 2020-07-15 | End: 2020-07-15

## 2020-07-15 RX ADMIN — IOPAMIDOL 67 ML: 755 INJECTION, SOLUTION INTRAVENOUS at 09:50

## 2020-07-15 RX ADMIN — SODIUM CHLORIDE 57 ML: 9 INJECTION, SOLUTION INTRAVENOUS at 09:50

## 2020-07-16 ENCOUNTER — MYC MEDICAL ADVICE (OUTPATIENT)
Dept: FAMILY MEDICINE | Facility: CLINIC | Age: 80
End: 2020-07-16

## 2020-07-20 ENCOUNTER — OFFICE VISIT (OUTPATIENT)
Dept: FAMILY MEDICINE | Facility: CLINIC | Age: 80
End: 2020-07-20
Payer: COMMERCIAL

## 2020-07-20 VITALS
BODY MASS INDEX: 26.44 KG/M2 | WEIGHT: 138.8 LBS | RESPIRATION RATE: 13 BRPM | OXYGEN SATURATION: 96 % | SYSTOLIC BLOOD PRESSURE: 132 MMHG | TEMPERATURE: 97.2 F | HEART RATE: 59 BPM | DIASTOLIC BLOOD PRESSURE: 70 MMHG

## 2020-07-20 DIAGNOSIS — E87.6 HYPOKALEMIA: Primary | ICD-10-CM

## 2020-07-20 DIAGNOSIS — R60.9 EDEMA, UNSPECIFIED TYPE: ICD-10-CM

## 2020-07-20 LAB — POTASSIUM SERPL-SCNC: 4 MMOL/L (ref 3.4–5.3)

## 2020-07-20 PROCEDURE — 36415 COLL VENOUS BLD VENIPUNCTURE: CPT | Performed by: NURSE PRACTITIONER

## 2020-07-20 PROCEDURE — 84132 ASSAY OF SERUM POTASSIUM: CPT | Performed by: NURSE PRACTITIONER

## 2020-07-20 PROCEDURE — 99213 OFFICE O/P EST LOW 20 MIN: CPT | Performed by: NURSE PRACTITIONER

## 2020-07-20 RX ORDER — HYDROCHLOROTHIAZIDE 25 MG/1
25 TABLET ORAL DAILY
Qty: 90 TABLET | Refills: 3 | Status: SHIPPED | OUTPATIENT
Start: 2020-07-20 | End: 2021-07-08

## 2020-07-20 RX ORDER — POTASSIUM CHLORIDE 1500 MG/1
20 TABLET, EXTENDED RELEASE ORAL DAILY
Qty: 90 TABLET | Refills: 3 | Status: SHIPPED | OUTPATIENT
Start: 2020-07-20 | End: 2021-07-19

## 2020-07-20 NOTE — PATIENT INSTRUCTIONS
Thank you for choosing [facility].  You may be receiving an email and/or telephone survey request from [facility] Customer Experience regarding your visit today.  Please take a few minutes to respond to the survey to let us know how we are doing.      If you have questions or concerns, please contact us via GenArts or you can contact your care team at [phone].    Our Clinic hours are:  Monday 6:40 am  to 7:00 pm  Tuesday -Friday 6:40 am to 5:00 pm    The [place] outpatient lab hours are:  Monday - Friday 6:10 am to 4:45 pm  Saturdays 7:00 am to 11:00 am  Appointments are required, call [phone]    If you have clinical questions after hours or would like to schedule an appointment,  call the clinic at [phone].

## 2020-07-20 NOTE — PROGRESS NOTES
Subjective     Janette Rahman is a 79 year old female who presents to clinic today for the following health issues:    HPI       Follow-up from 7/6/20, having foot and ankle swelling since she stopped hydrochlorothiazide. She had labs done K+ to be rechecked (see Kiwilogict message from 7/16).      -------------------------------------    Patient Active Problem List   Diagnosis     Essential hypertension, benign     HLD (hyperlipidemia)     Inflamed seborrheic keratosis     Osteopenia     Past Surgical History:   Procedure Laterality Date     ADENOIDECTOMY       BUNIONECTOMY       CARPAL TUNNEL RELEASE RT/LT       ROTATOR CUFF REPAIR RT/LT  2/2006     SHOULDER SURGERY      impingment     TONSILLECTOMY         Social History     Tobacco Use     Smoking status: Never Smoker     Smokeless tobacco: Never Used   Substance Use Topics     Alcohol use: Not Currently     Alcohol/week: 1.7 - 2.5 standard drinks     Comment: 3 glasses per week     Family History   Problem Relation Age of Onset     Coronary Artery Disease Mother      Hypertension Mother      Cerebrovascular Disease Mother         60-70's     Heart Disease Father         valve replacement in 80's     Cancer No family hx of         no skin cancer           Reviewed and updated as needed this visit by Provider         Review of Systems   Constitutional, HEENT, cardiovascular, pulmonary, GI, , musculoskeletal, neuro, skin, endocrine and psych systems are negative, except as otherwise noted.      Objective    Resp 13   Wt 63 kg (138 lb 12.8 oz)   LMP  (LMP Unknown)   BMI 26.44 kg/m    Body mass index is 26.44 kg/m .  Physical Exam   GENERAL: healthy, alert and no distress  RESP: lungs clear to auscultation - no rales, rhonchi or wheezes  CV: regular rate and rhythm, normal S1 S2, no S3 or S4, no murmur, click or rub, trace peripheral edema and peripheral pulses strong  MS: no gross musculoskeletal defects noted, no edema    Diagnostic Test Results:  Labs  reviewed in Epic        Assessment & Plan     1. Hypokalemia    - Potassium  - potassium chloride ER (K-TAB) 20 MEQ CR tablet; Take 1 tablet (20 mEq) by mouth daily  Dispense: 90 tablet; Refill: 3    2. Edema, unspecified type    - potassium chloride ER (K-TAB) 20 MEQ CR tablet; Take 1 tablet (20 mEq) by mouth daily  Dispense: 90 tablet; Refill: 3  - hydrochlorothiazide (HYDRODIURIL) 25 MG tablet; Take 1 tablet (25 mg) by mouth daily  Dispense: 90 tablet; Refill: 3       Repeat K+ in 1 month    No follow-ups on file.    VERÓNICA Aguilar [facility]

## 2020-07-21 DIAGNOSIS — E87.6 HYPOKALEMIA: Primary | ICD-10-CM

## 2020-08-04 DIAGNOSIS — Z23 NEED FOR PROPHYLACTIC VACCINATION WITH TETANUS-DIPHTHERIA (TD): ICD-10-CM

## 2020-08-09 RX ORDER — ATORVASTATIN CALCIUM 20 MG/1
20 TABLET, FILM COATED ORAL DAILY
Qty: 90 TABLET | Refills: 3 | OUTPATIENT
Start: 2020-08-09

## 2020-08-11 ENCOUNTER — MYC MEDICAL ADVICE (OUTPATIENT)
Dept: FAMILY MEDICINE | Facility: CLINIC | Age: 80
End: 2020-08-11

## 2020-08-11 DIAGNOSIS — E78.5 HYPERLIPIDEMIA, UNSPECIFIED HYPERLIPIDEMIA TYPE: Primary | ICD-10-CM

## 2020-08-14 DIAGNOSIS — Z23 NEED FOR PROPHYLACTIC VACCINATION WITH TETANUS-DIPHTHERIA (TD): ICD-10-CM

## 2020-08-14 RX ORDER — ATORVASTATIN CALCIUM 20 MG/1
20 TABLET, FILM COATED ORAL DAILY
Qty: 90 TABLET | Refills: 3 | Status: SHIPPED | OUTPATIENT
Start: 2020-08-14 | End: 2021-07-08

## 2020-08-14 NOTE — TELEPHONE ENCOUNTER
Provider covering for Alyssa Membreno,    Please see my chart message from patient.  Was told by Yulia to restart her lipitor but she does not have any.  I have pended for your approval. LAUREEN Sánchez, Alyssa Ventura, VERÓNICA CNP   7/6/2020  3:58 PM       Patient's cholesterol is very high- I recommend that patient restart taking her cholesterol medication that she stopped and recheck cholesterol in 3 months       Her potassium is low- recommend to stop taking hydrochlorothiazide and then follow up in clinic with a blood pressure recheck and a repeat K+ in 2 weeks.

## 2020-08-18 RX ORDER — ATORVASTATIN CALCIUM 20 MG/1
20 TABLET, FILM COATED ORAL DAILY
Qty: 90 TABLET | Refills: 3 | OUTPATIENT
Start: 2020-08-18

## 2020-08-18 NOTE — TELEPHONE ENCOUNTER
ATORVASTATIN CALCIUM 20 MG 20 TAB     atorvastatin (LIPITOR) 20 MG tablet  90 tablet  3  8/14/2020   --    Sig - Route: Take 1 tablet (20 mg) by mouth daily - Oral    Sent to pharmacy as: Atorvastatin Calcium 20 MG Oral Tablet (LIPITOR)    Class: E-Prescribe    Order: [id number]    E-Prescribing Status: Receipt confirmed by pharmacy (8/14/2020  5:00 PM CDT)    Printout Tracking     External Result Report    Pharmacy     [facility] [place] [address] [address]      Darlene Draper RN  Central Triage Red Flags/Med Refills

## 2020-08-21 DIAGNOSIS — E87.6 HYPOKALEMIA: ICD-10-CM

## 2020-08-21 LAB — POTASSIUM SERPL-SCNC: 3.6 MMOL/L (ref 3.4–5.3)

## 2020-08-21 PROCEDURE — 84132 ASSAY OF SERUM POTASSIUM: CPT | Performed by: NURSE PRACTITIONER

## 2020-08-21 PROCEDURE — 36415 COLL VENOUS BLD VENIPUNCTURE: CPT | Performed by: NURSE PRACTITIONER

## 2020-09-14 DIAGNOSIS — I10 ESSENTIAL HYPERTENSION WITH GOAL BLOOD PRESSURE LESS THAN 140/90: ICD-10-CM

## 2020-09-17 RX ORDER — LOSARTAN POTASSIUM 100 MG/1
TABLET ORAL
Qty: 90 TABLET | Refills: 0 | OUTPATIENT
Start: 2020-09-17

## 2020-12-04 ENCOUNTER — MYC MEDICAL ADVICE (OUTPATIENT)
Dept: FAMILY MEDICINE | Facility: CLINIC | Age: 80
End: 2020-12-04

## 2020-12-07 ENCOUNTER — OFFICE VISIT (OUTPATIENT)
Dept: FAMILY MEDICINE | Facility: CLINIC | Age: 80
End: 2020-12-07
Payer: COMMERCIAL

## 2020-12-07 ENCOUNTER — ANCILLARY PROCEDURE (OUTPATIENT)
Dept: GENERAL RADIOLOGY | Facility: CLINIC | Age: 80
End: 2020-12-07
Attending: NURSE PRACTITIONER
Payer: COMMERCIAL

## 2020-12-07 ENCOUNTER — HOSPITAL ENCOUNTER (OUTPATIENT)
Dept: MRI IMAGING | Facility: CLINIC | Age: 80
Discharge: HOME | End: 2020-12-07
Attending: NURSE PRACTITIONER | Admitting: NURSE PRACTITIONER
Payer: COMMERCIAL

## 2020-12-07 VITALS
SYSTOLIC BLOOD PRESSURE: 124 MMHG | BODY MASS INDEX: 25.86 KG/M2 | RESPIRATION RATE: 16 BRPM | HEIGHT: 61 IN | HEART RATE: 54 BPM | WEIGHT: 137 LBS | OXYGEN SATURATION: 98 % | TEMPERATURE: 97.1 F | DIASTOLIC BLOOD PRESSURE: 72 MMHG

## 2020-12-07 DIAGNOSIS — S83.281A TEAR OF LATERAL CARTILAGE OR MENISCUS OF KNEE, CURRENT, RIGHT, INITIAL ENCOUNTER: ICD-10-CM

## 2020-12-07 DIAGNOSIS — S89.91XA KNEE INJURY, RIGHT, INITIAL ENCOUNTER: ICD-10-CM

## 2020-12-07 DIAGNOSIS — S83.241A TEAR OF MEDIAL MENISCUS OF RIGHT KNEE, CURRENT, UNSPECIFIED TEAR TYPE, INITIAL ENCOUNTER: Primary | ICD-10-CM

## 2020-12-07 DIAGNOSIS — S89.91XA KNEE INJURY, RIGHT, INITIAL ENCOUNTER: Primary | ICD-10-CM

## 2020-12-07 PROCEDURE — 73562 X-RAY EXAM OF KNEE 3: CPT | Mod: RT | Performed by: RADIOLOGY

## 2020-12-07 PROCEDURE — 73721 MRI JNT OF LWR EXTRE W/O DYE: CPT | Mod: RT

## 2020-12-07 PROCEDURE — 99214 OFFICE O/P EST MOD 30 MIN: CPT | Performed by: NURSE PRACTITIONER

## 2020-12-07 PROCEDURE — 73721 MRI JNT OF LWR EXTRE W/O DYE: CPT | Mod: 26 | Performed by: RADIOLOGY

## 2020-12-07 ASSESSMENT — MIFFLIN-ST. JEOR: SCORE: 1034.84

## 2020-12-07 NOTE — PATIENT INSTRUCTIONS
Schedule MRI and I will let you know what it shows.      Thank you for choosing [facility]  You may be receiving an email and/or telephone survey request from [facility] Customer Experience regarding your visit today.  Please take a few minutes to respond to the survey to let us know how we are doing.      If you have questions or concerns, please contact us via [email or web address] or you can contact your care team at [phone].    Our Clinic hours are:  Monday 6:40 am  to 7:00 pm  Tuesday -Friday 6:40 am to 5:00 pm    The [place] outpatient lab hours are:  Monday - Friday 6:10 am to 4:45 pm  Saturdays 7:00 am to 11:00 am  Appointments are required, call [phone]    If you have clinical questions after hours or would like to schedule an appointment,  call the clinic at [phone].

## 2020-12-07 NOTE — PROGRESS NOTES
"Subjective     Janette Rahman is a 80 year old female who presents to clinic today for the following health issues:    HPI         Musculoskeletal problem/pain  Onset/Duration: 8 weeks ago, something popped in the knee.  Description  Location: knee - right.   Joint Swelling: YES and stiffness.  Redness: no  Pain: YES-  Inner part of the right knee.  Sometimes the knee can give out when walking.  Warmth: no  Intensity:  moderate  Progression of Symptoms:  same  Accompanying signs and symptoms:   Fevers: no  Numbness/tingling/weakness: no  History  Trauma to the area: no, she just went to turn around and that is when she heard the popping sound.  Recent illness:  no  Previous similar problem: no  Previous evaluation:  no  Precipitating or alleviating factors:  Aggravating factors include: walking, climbing stairs and getting into bed.  Therapies tried and outcome: heat and ice, Advil two BID.    Above HPI reviewed. Additionally, pain worst with using stairs, seems to have worsened over past month, feels stiff often. Swells by end of the day.          Review of Systems   Constitutional, HEENT, cardiovascular, pulmonary, gi and gu systems are negative, except as otherwise noted.      Objective    /72   Pulse 54   Temp 97.1  F (36.2  C) (Tympanic)   Resp 16   Ht 1.543 m (5' 0.75\")   Wt 62.1 kg (137 lb)   LMP  (LMP Unknown)   SpO2 98%   BMI 26.10 kg/m    Body mass index is 26.1 kg/m .  Physical Exam  Vitals signs and nursing note reviewed.   Constitutional:       General: She is not in acute distress.     Appearance: Normal appearance.   HENT:      Head: Normocephalic and atraumatic.      Mouth/Throat:      Mouth: Mucous membranes are moist.   Neck:      Musculoskeletal: Neck supple.   Cardiovascular:      Rate and Rhythm: Normal rate.   Pulmonary:      Effort: Pulmonary effort is normal.   Musculoskeletal:      Comments: Right knee - no deformity, no overlying erythema. TTP over medial joint line. Full " extension, flexion limited by pain. No TTP of popliteal fossa, no masses appreciated. CMS intact.   Skin:     General: Skin is warm and dry.   Neurological:      General: No focal deficit present.      Mental Status: She is alert.   Psychiatric:         Mood and Affect: Mood normal.         Behavior: Behavior normal.            Xray - Right knee ?small joint effusion. Pending radiology interpretation.          Assessment & Plan     Knee injury, right, initial encounter  Suspected medial meniscus tear, however given worsening pain over 8 weeks, will obtain MRI. If partial tear, will refer for PT otherwise refer to ortho.  - XR Knee Right 3 Views  - MR Knee Right w/o Contrast; Future        Patient Instructions   Schedule MRI and I will let you know what it shows.      Thank you for choosing [facility].  You may be receiving an email and/or telephone survey request from [facility] regarding your visit today.  Please take a few minutes to respond to the survey to let us know how we are doing.      If you have questions or concerns, please contact us via [employer] or you can contact your care team at [phone].    Our Clinic hours are:  Monday 6:40 am  to 7:00 pm  Tuesday -Friday 6:40 am to 5:00 pm    The [place] outpatient lab hours are:  Monday - Friday 6:10 am to 4:45 pm  Saturdays 7:00 am to 11:00 am  Appointments are required, call [phone]    If you have clinical questions after hours or would like to schedule an appointment,  call the clinic at [phone].        Return in about 1 week (around 12/14/2020) for worsening or continued symptoms.    VERÓNICA Camarillo [facility]

## 2020-12-10 ENCOUNTER — OFFICE VISIT (OUTPATIENT)
Dept: ORTHOPEDICS | Facility: CLINIC | Age: 80
End: 2020-12-10
Payer: COMMERCIAL

## 2020-12-10 VITALS — HEART RATE: 84 BPM | SYSTOLIC BLOOD PRESSURE: 134 MMHG | OXYGEN SATURATION: 96 % | DIASTOLIC BLOOD PRESSURE: 69 MMHG

## 2020-12-10 DIAGNOSIS — S83.241A OTHER TEAR OF MEDIAL MENISCUS OF RIGHT KNEE, UNSPECIFIED WHETHER OLD OR CURRENT TEAR, INITIAL ENCOUNTER: ICD-10-CM

## 2020-12-10 DIAGNOSIS — M17.11 PRIMARY OSTEOARTHRITIS OF RIGHT KNEE: Primary | ICD-10-CM

## 2020-12-10 DIAGNOSIS — S83.281A OTHER TEAR OF LATERAL MENISCUS OF RIGHT KNEE, UNSPECIFIED WHETHER OLD OR CURRENT TEAR, INITIAL ENCOUNTER: ICD-10-CM

## 2020-12-10 PROCEDURE — 20610 DRAIN/INJ JOINT/BURSA W/O US: CPT | Mod: RT | Performed by: ORTHOPAEDIC SURGERY

## 2020-12-10 PROCEDURE — 99203 OFFICE O/P NEW LOW 30 MIN: CPT | Mod: 25 | Performed by: ORTHOPAEDIC SURGERY

## 2020-12-10 RX ORDER — LIDOCAINE HYDROCHLORIDE 10 MG/ML
4 INJECTION, SOLUTION INFILTRATION; PERINEURAL
Status: DISCONTINUED | OUTPATIENT
Start: 2020-12-10 | End: 2021-07-19

## 2020-12-10 RX ORDER — METHYLPREDNISOLONE ACETATE 80 MG/ML
80 INJECTION, SUSPENSION INTRA-ARTICULAR; INTRALESIONAL; INTRAMUSCULAR; SOFT TISSUE
Status: DISCONTINUED | OUTPATIENT
Start: 2020-12-10 | End: 2021-07-19

## 2020-12-10 RX ADMIN — METHYLPREDNISOLONE ACETATE 80 MG: 80 INJECTION, SUSPENSION INTRA-ARTICULAR; INTRALESIONAL; INTRAMUSCULAR; SOFT TISSUE at 13:31

## 2020-12-10 RX ADMIN — LIDOCAINE HYDROCHLORIDE 4 ML: 10 INJECTION, SOLUTION INFILTRATION; PERINEURAL at 13:31

## 2020-12-10 ASSESSMENT — PAIN SCALES - GENERAL: PAINLEVEL: MODERATE PAIN (5)

## 2020-12-10 NOTE — PROGRESS NOTES
Large Joint Injection/Arthocentesis: R knee joint    Date/Time: 12/10/2020 1:31 PM  Performed by: Ho Mendoza  Authorized by: David Schultz MD     Indications:  Pain and osteoarthritis  Needle Size:  22 G  Guidance: landmark guided    Approach:  Anterolateral  Location:  Knee      Medications:  80 mg methylPREDNISolone 80 MG/ML; 4 mL lidocaine 1 %  Outcome:  Tolerated well, no immediate complications  Procedure discussed: discussed risks, benefits, and alternatives    Consent Given by:  Patient  Timeout: timeout called immediately prior to procedure    Prep: patient was prepped and draped in usual sterile fashion

## 2020-12-10 NOTE — LETTER
12/10/2020         RE: Janette Rahman  [address] [place] [address]        Dear Colleague,    Thank you for referring your patient, Janette Rahman, to the [facility]. Please see a copy of my visit note below.    SUBJECTIVE:   Janette Rahman is a 80 year old female who is seen in consultation at the request of Giselle SPRINGER for evaluation of right knee pain.      Onset/Duration: 8 weeks ago, something popped in the knee.  Description  Location: knee - right.   Joint Swelling: YES and stiffness.  Redness: no  Pain: YES-  Inner part of the right knee.  Sometimes the knee can give out when walking.  Warmth: no  Intensity:  moderate    Present symptoms: + giving-way of the knee.  Swells.    Pain medial  Gets warm  Pain squatting  Night pain  Pain to extend     Treatments tried to this point: ice, heat, nsaids.    Previous knee issues: none really.    Past Medical History:   Past Medical History:   Diagnosis Date     HLD (hyperlipidaemia)      HTN (hypertension)      Osteoporosis      Past Surgical History:   Past Surgical History:   Procedure Laterality Date     ADENOIDECTOMY       BUNIONECTOMY       CARPAL TUNNEL RELEASE RT/LT       ROTATOR CUFF REPAIR RT/LT  2/2006     SHOULDER SURGERY      impingment     TONSILLECTOMY       Family History:   Family History   Problem Relation Age of Onset     Coronary Artery Disease Mother      Hypertension Mother      Cerebrovascular Disease Mother         60-70's     Heart Disease Father         valve replacement in 80's     Cancer No family hx of         no skin cancer     Social History:   Social History     Tobacco Use     Smoking status: Never Smoker     Smokeless tobacco: Never Used   Substance Use Topics     Alcohol use: Not Currently     Alcohol/week: 1.7 - 2.5 standard drinks     Comment: 3 glasses per week       Review of Systems:  Constitutional:  NEGATIVE for fever, chills, change in weight  Integumentary/Skin:   "NEGATIVE for worrisome rashes, moles or lesions  Eyes:  NEGATIVE for vision changes or irritation  ENT/Mouth:  NEGATIVE for ear, mouth and throat problems  Resp:  NEGATIVE for significant cough or SOB  Breast:  NEGATIVE for masses, tenderness or discharge  CV:  NEGATIVE for chest pain, palpitations or peripheral edema  GI:  NEGATIVE for nausea, abdominal pain, heartburn, or change in bowel habits  :  Negative   Musculoskeletal:  See HPI above  Neuro:  NEGATIVE for weakness, dizziness or paresthesias  Endocrine:  NEGATIVE for temperature intolerance, skin/hair changes  Heme/allergy/immune:  NEGATIVE for bleeding problems  Psychiatric:  NEGATIVE for changes in mood or affect      OBJECTIVE:  Physical Exam:  /72   Pulse 54   Temp 97.1  F (36.2  C) (Tympanic)   Resp 16   Ht 1.543 m (5' 0.75\")   Wt 62.1 kg (137 lb)   LMP  (LMP Unknown)   SpO2 98%   BMI 26.10 kg/m      General Appearance: healthy, alert and no distress   Skin: no suspicious lesions or rashes  Neuro: Normal strength and tone, mentation intact and speech normal  Vascular: good pulses, and cappillary refill   Lymph: no lymphadenopathy   Psych:  mentation appears normal and affect normal/bright  Resp: no increased work of breathing     Right Knee Exam:  Gait: walks with normal gait  Alignment: normal   Squat: not tested    Patellofemoral joint: minimal crepitations in the patellofemoral joint.  Effusion: mild  ROM: 2*-105*  Tender: medial joint line and lateral joint line  Masses: none  Ligaments:   Lachman's: stable   Anterior/Posterior drawer: stable,   Varus/Valgus stress: stable to varus and valgus stress  McMurrays: negative, pain with hyperflexion    X-rays:  Obtained 12/7/20, standing films, reviewed in the office with the patient today:    No acute bony or soft tissue abnormality. No significant  medial or lateral joint space narrowing but there is mild lateral  compartment marginal bony spurring. Moderate medial patellofemoral  joint " narrowing with minimal bony spurring. Possible mild  chondrocalcinosis. No joint space effusion.      MRI:  Right knee  From 12/7/20 reviewed in the office with the patient today:                                                                      Impression:     1. Horizontal tearing of the body and posterior horn of the medial  meniscus. Horizontal tearing of the body and posterior horn of the  lateral meniscus.     2. 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 Partial-thickness fissuring of cartilage in the medial  compartment.     3. Moderate joint effusion       ASSESSMENT:   Osteoarthritis right knee  Medial meniscus tear and lateral meniscus tear right knee,     Encounter Diagnoses   Name Primary?     Primary osteoarthritis of right knee Yes     Other tear of medial meniscus of right knee, unspecified whether old or current tear, initial encounter      Other tear of lateral meniscus of right knee, unspecified whether old or current tear, initial encounter         PLAN:   We discussed the options.   Injection Therapy: (oa/mt) :Discussed findings and diagnosis with patient.  We talked about treatment options.  The pain may be due to either arthritis or the meniscal tear; or a combination of both. Because of the lack of mechanical symptoms, I recommended corticosteroid injection .     With the patient's consent, right knee(s) injected intra-articularly with 80mg of Depomedrol and 4cc of local anesthetic after sterile prep.    Return to clinic: as needed     GIANNA Schultz MD  Dept. Orthopedic Surgery  [facility]     Large Joint Injection/Arthocentesis: R knee joint    Date/Time: 12/10/2020 1:31 PM  Performed by: Ho Mendoza  Authorized by: David Schultz MD     Indications:  Pain and osteoarthritis  Needle Size:  22 G  Guidance: landmark guided    Approach:  Anterolateral  Location:  Knee      Medications:  80 mg methylPREDNISolone 80 MG/ML; 4 mL  lidocaine 1 %  Outcome:  Tolerated well, no immediate complications  Procedure discussed: discussed risks, benefits, and alternatives    Consent Given by:  Patient  Timeout: timeout called immediately prior to procedure    Prep: patient was prepped and draped in usual sterile fashion              Again, thank you for allowing me to participate in the care of your patient.        Sincerely,        David Schultz MD

## 2020-12-10 NOTE — PROGRESS NOTES
SUBJECTIVE:   Janette Rahman is a 80 year old female who is seen in consultation at the request of Giselle SPRINGER for evaluation of right knee pain.      Onset/Duration: 8 weeks ago, something popped in the knee.  Description  Location: knee - right.   Joint Swelling: YES and stiffness.  Redness: no  Pain: YES-  Inner part of the right knee.  Sometimes the knee can give out when walking.  Warmth: no  Intensity:  moderate    Present symptoms: + giving-way of the knee.  Swells.    Pain medial  Gets warm  Pain squatting  Night pain  Pain to extend     Treatments tried to this point: ice, heat, nsaids.    Previous knee issues: none really.    Past Medical History:   Past Medical History:   Diagnosis Date     HLD (hyperlipidaemia)      HTN (hypertension)      Osteoporosis      Past Surgical History:   Past Surgical History:   Procedure Laterality Date     ADENOIDECTOMY       BUNIONECTOMY       CARPAL TUNNEL RELEASE RT/LT       ROTATOR CUFF REPAIR RT/LT  2/2006     SHOULDER SURGERY      impingment     TONSILLECTOMY       Family History:   Family History   Problem Relation Age of Onset     Coronary Artery Disease Mother      Hypertension Mother      Cerebrovascular Disease Mother         60-70's     Heart Disease Father         valve replacement in 80's     Cancer No family hx of         no skin cancer     Social History:   Social History     Tobacco Use     Smoking status: Never Smoker     Smokeless tobacco: Never Used   Substance Use Topics     Alcohol use: Not Currently     Alcohol/week: 1.7 - 2.5 standard drinks     Comment: 3 glasses per week       Review of Systems:  Constitutional:  NEGATIVE for fever, chills, change in weight  Integumentary/Skin:  NEGATIVE for worrisome rashes, moles or lesions  Eyes:  NEGATIVE for vision changes or irritation  ENT/Mouth:  NEGATIVE for ear, mouth and throat problems  Resp:  NEGATIVE for significant cough or SOB  Breast:  NEGATIVE for masses, tenderness or discharge  CV:   "NEGATIVE for chest pain, palpitations or peripheral edema  GI:  NEGATIVE for nausea, abdominal pain, heartburn, or change in bowel habits  :  Negative   Musculoskeletal:  See HPI above  Neuro:  NEGATIVE for weakness, dizziness or paresthesias  Endocrine:  NEGATIVE for temperature intolerance, skin/hair changes  Heme/allergy/immune:  NEGATIVE for bleeding problems  Psychiatric:  NEGATIVE for changes in mood or affect      OBJECTIVE:  Physical Exam:  /72   Pulse 54   Temp 97.1  F (36.2  C) (Tympanic)   Resp 16   Ht 1.543 m (5' 0.75\")   Wt 62.1 kg (137 lb)   LMP  (LMP Unknown)   SpO2 98%   BMI 26.10 kg/m      General Appearance: healthy, alert and no distress   Skin: no suspicious lesions or rashes  Neuro: Normal strength and tone, mentation intact and speech normal  Vascular: good pulses, and cappillary refill   Lymph: no lymphadenopathy   Psych:  mentation appears normal and affect normal/bright  Resp: no increased work of breathing     Right Knee Exam:  Gait: walks with normal gait  Alignment: normal   Squat: not tested    Patellofemoral joint: minimal crepitations in the patellofemoral joint.  Effusion: mild  ROM: 2*-105*  Tender: medial joint line and lateral joint line  Masses: none  Ligaments:   Lachman's: stable   Anterior/Posterior drawer: stable,   Varus/Valgus stress: stable to varus and valgus stress  McMurrays: negative, pain with hyperflexion    X-rays:  Obtained 12/7/20, standing films, reviewed in the office with the patient today:    No acute bony or soft tissue abnormality. No significant  medial or lateral joint space narrowing but there is mild lateral  compartment marginal bony spurring. Moderate medial patellofemoral  joint narrowing with minimal bony spurring. Possible mild  chondrocalcinosis. No joint space effusion.      MRI:  Right knee  From 12/7/20 reviewed in the office with the patient today:                                                                    "   Impression:     1. Horizontal tearing of the body and posterior horn of the medial  meniscus. Horizontal tearing of the body and posterior horn of the  lateral meniscus.     2. 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 Partial-thickness fissuring of cartilage in the medial  compartment.     3. Moderate joint effusion       ASSESSMENT:   Osteoarthritis right knee  Medial meniscus tear and lateral meniscus tear right knee,     Encounter Diagnoses   Name Primary?     Primary osteoarthritis of right knee Yes     Other tear of medial meniscus of right knee, unspecified whether old or current tear, initial encounter      Other tear of lateral meniscus of right knee, unspecified whether old or current tear, initial encounter         PLAN:   We discussed the options.   Injection Therapy: (oa/mt) :Discussed findings and diagnosis with patient.  We talked about treatment options.  The pain may be due to either arthritis or the meniscal tear; or a combination of both. Because of the lack of mechanical symptoms, I recommended corticosteroid injection .     With the patient's consent, right knee(s) injected intra-articularly with 80mg of Depomedrol and 4cc of local anesthetic after sterile prep.    Return to clinic: as needed     GIANNA Schultz MD  Dept. Orthopedic Surgery  [facility]

## 2020-12-20 ENCOUNTER — HEALTH MAINTENANCE LETTER (OUTPATIENT)
Age: 80
End: 2020-12-20

## 2021-02-28 ENCOUNTER — HEALTH MAINTENANCE LETTER (OUTPATIENT)
Age: 81
End: 2021-02-28

## 2021-03-31 ENCOUNTER — MYC MEDICAL ADVICE (OUTPATIENT)
Dept: FAMILY MEDICINE | Facility: CLINIC | Age: 81
End: 2021-03-31

## 2021-03-31 NOTE — TELEPHONE ENCOUNTER
Alyssa,    Patient is in [place] and sends my chart message about having swollen ankles.  Patient thinks its her losartan.  She is on amlodipine also.  Please advise. Mckenzie AVILA RN

## 2021-06-01 NOTE — PROGRESS NOTES
HISTORY OF PRESENT ILLNESS    Janette Rahman is a 80 year old female who is seen in follow-up for evaluation of  right knee   Osteoarthritis and medial meniscus and lateral meniscus tears based on a 12/7/20 MRI.    Treatments tried to this point:   Because of the lack of mechanical symptoms, Corticosteroid injection 12/10/20.  Length of effectiveness: 5-6 months    Present symptoms:   Pain has returned with some swelling, +catching/popping, +locking, +giving way.      Other PMH:  has a past medical history of HLD (hyperlipidaemia), HTN (hypertension), and Osteoporosis.    Surgical:  has a past surgical history that includes shoulder surgery; rotator cuff repair rt/lt (2/2006); Bunionectomy; carpal tunnel release rt/lt; Adenoidectomy; Tonsillectomy; and meniscal tear knee (Right, 12/2020).    Family Hx:  family history includes Cerebrovascular Disease in her mother; Coronary Artery Disease in her mother; Heart Disease in her father; Hypertension in her mother.    Social Hx:  reports that she has never smoked. She has never used smokeless tobacco. She reports previous alcohol use of about 1.7 - 2.5 standard drinks of alcohol per week. She reports that she does not use drugs.    REVIEW OF SYSTEMS:    CONSTITUTIONAL:  NEGATIVE for fever, chills, change in weight  INTEGUMENTARY/SKIN:  NEGATIVE for worrisome rashes, moles or lesions  EYES:  NEGATIVE for vision changes or irritation  ENT/MOUTH:  NEGATIVE for ear, mouth and throat problems  RESP:  NEGATIVE for significant cough or SOB  BREAST:  NEGATIVE for masses, tenderness or discharge  CV:  NEGATIVE for chest pain, palpitations or peripheral edema  GI:  NEGATIVE for nausea, abdominal pain, heartburn, or change in bowel habits  :  Negative   MUSCULOSKELETAL:  See HPI above  NEURO:  NEGATIVE for weakness, dizziness or paresthesias  ENDOCRINE:  NEGATIVE for temperature intolerance, skin/hair changes  HEME/ALLERGY/IMMUNE:  NEGATIVE for bleeding problems  PSYCHIATRIC:   NEGATIVE for changes in mood or affect    PHYSICAL EXAM:  LMP  (LMP Unknown)    GENERAL APPEARANCE: healthy, alert and no distress   SKIN: no suspicious lesions or rashes  NEURO: Normal strength and tone, mentation intact and speech normal  VASCULAR:  good pulses, and cappillary refill   LYMPH: no lymphadenopathy   PSYCH:  mentation appears normal and affect normal/bright  RESP: no increased work of breathing     KNEE EXAM:   Gait: walks with antalgic gait favoring right side  Alignment: normal   Effusion: moderate  ROM: 0-110    X-RAY:  Obtained 12/7/20,    No acute bony or soft tissue abnormality. No significant  medial or lateral joint space narrowing but there is mild lateral  compartment marginal bony spurring. Moderate medial patellofemoral  joint narrowing with minimal bony spurring. Possible mild  chondrocalcinosis. No joint space effusion.       MRI 12/7/20 right knee  1 Horizontal tearing of the body and posterior horn of the medial  meniscus. Horizontal tearing of the body and posterior horn of the  lateral meniscus.  2. 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 Partial-thickness fissuring of cartilage in the medial  compartment.  3. Moderate joint effusion       Impression:   Osteoarthritis right knee  Medial meniscus tear and lateral meniscus tear right knee,     Plan:  She is going out of town next week, and even though she has mechanical symptoms, she doesn't have them when the injection is working, and doesn't want arthroscopy at this time.    Injection therapy: Discussed findings and diagnosis with patient.  We talked about treatment options.  We decided that repeat corticosteroid injection would be the best option.  Thus, With the patient's consent, right knee(s) injected intra-articularly with 80mg of Depomedrol and 4cc of local anesthetic after sterile prep.      Return to clinic as needed.      GIANNA Schultz MD  Dept. Orthopedic Surgery  [facility]  Services

## 2021-06-02 ENCOUNTER — OFFICE VISIT (OUTPATIENT)
Dept: ORTHOPEDICS | Facility: CLINIC | Age: 81
End: 2021-06-02
Payer: COMMERCIAL

## 2021-06-02 VITALS
DIASTOLIC BLOOD PRESSURE: 75 MMHG | HEART RATE: 72 BPM | HEIGHT: 61 IN | OXYGEN SATURATION: 97 % | SYSTOLIC BLOOD PRESSURE: 130 MMHG | WEIGHT: 137 LBS | BODY MASS INDEX: 25.86 KG/M2

## 2021-06-02 DIAGNOSIS — M17.11 PRIMARY OSTEOARTHRITIS OF RIGHT KNEE: ICD-10-CM

## 2021-06-02 DIAGNOSIS — S83.281A OTHER TEAR OF LATERAL MENISCUS OF RIGHT KNEE, UNSPECIFIED WHETHER OLD OR CURRENT TEAR, INITIAL ENCOUNTER: ICD-10-CM

## 2021-06-02 DIAGNOSIS — S83.241A OTHER TEAR OF MEDIAL MENISCUS OF RIGHT KNEE, UNSPECIFIED WHETHER OLD OR CURRENT TEAR, INITIAL ENCOUNTER: ICD-10-CM

## 2021-06-02 PROCEDURE — 20610 DRAIN/INJ JOINT/BURSA W/O US: CPT | Mod: RT | Performed by: ORTHOPAEDIC SURGERY

## 2021-06-02 RX ORDER — METHYLPREDNISOLONE ACETATE 80 MG/ML
80 INJECTION, SUSPENSION INTRA-ARTICULAR; INTRALESIONAL; INTRAMUSCULAR; SOFT TISSUE
Status: DISCONTINUED | OUTPATIENT
Start: 2021-06-02 | End: 2021-07-19

## 2021-06-02 RX ORDER — LIDOCAINE HYDROCHLORIDE 10 MG/ML
4 INJECTION, SOLUTION EPIDURAL; INFILTRATION; INTRACAUDAL; PERINEURAL
Status: DISCONTINUED | OUTPATIENT
Start: 2021-06-02 | End: 2021-07-19

## 2021-06-02 RX ADMIN — LIDOCAINE HYDROCHLORIDE 4 ML: 10 INJECTION, SOLUTION EPIDURAL; INFILTRATION; INTRACAUDAL; PERINEURAL at 16:19

## 2021-06-02 RX ADMIN — METHYLPREDNISOLONE ACETATE 80 MG: 80 INJECTION, SUSPENSION INTRA-ARTICULAR; INTRALESIONAL; INTRAMUSCULAR; SOFT TISSUE at 16:19

## 2021-06-02 ASSESSMENT — PAIN SCALES - GENERAL: PAINLEVEL: SEVERE PAIN (7)

## 2021-06-02 ASSESSMENT — MIFFLIN-ST. JEOR: SCORE: 1034.84

## 2021-06-02 NOTE — PATIENT INSTRUCTIONS
AFTER VISIT SUMMARY    [facility] Injection Discharge Instructions     You may shower, however avoid swimming, tub baths or hot tubs for 24 hours following your procedure     You may have a mild to moderate increase in pain for several days following the injection.     It may take up to 14 days for the steroid medication to start working although you may feel the effect as early as a few days after the procedure.     You may use ice packs for 10-15 minutes, 3 to 4 times a day at the injection site for comfort     You may use anti-inflammatory medications (such as Ibuprofen or Aleve or Advil) or Tylenol for pain control if necessary     If you were fasting, you may resume your normal diet and medications after the procedure     If you have diabetes, check your blood sugar more frequently than usual as your blood sugar may be higher than normal for 10-14 days following a steroid injection. Contact your doctor who manages your diabetes if your blood sugar is higher than usual      If you experience any of the following, call [facility] [phone]  -Fever over 100 degree F  -Swelling, bleeding, redness, drainage, warmth at the injection site  - New or worsening pain

## 2021-06-02 NOTE — LETTER
6/2/2021         RE: Janette Rahman  [address] [place] [address]        Dear Colleague,    Thank you for referring your patient, Janette Rahman, to the [facility]. Please see a copy of my visit note below.    HISTORY OF PRESENT ILLNESS    Janette Rahman is a 80 year old female who is seen in follow-up for evaluation of  right knee   Osteoarthritis and medial meniscus and lateral meniscus tears based on a 12/7/20 MRI.    Treatments tried to this point:   Because of the lack of mechanical symptoms, Corticosteroid injection 12/10/20.  Length of effectiveness: 5-6 months    Present symptoms:   Pain has returned with some swelling, +catching/popping, +locking, +giving way.      Other PMH:  has a past medical history of HLD (hyperlipidaemia), HTN (hypertension), and Osteoporosis.    Surgical:  has a past surgical history that includes shoulder surgery; rotator cuff repair rt/lt (2/2006); Bunionectomy; carpal tunnel release rt/lt; Adenoidectomy; Tonsillectomy; and meniscal tear knee (Right, 12/2020).    Family Hx:  family history includes Cerebrovascular Disease in her mother; Coronary Artery Disease in her mother; Heart Disease in her father; Hypertension in her mother.    Social Hx:  reports that she has never smoked. She has never used smokeless tobacco. She reports previous alcohol use of about 1.7 - 2.5 standard drinks of alcohol per week. She reports that she does not use drugs.    REVIEW OF SYSTEMS:    CONSTITUTIONAL:  NEGATIVE for fever, chills, change in weight  INTEGUMENTARY/SKIN:  NEGATIVE for worrisome rashes, moles or lesions  EYES:  NEGATIVE for vision changes or irritation  ENT/MOUTH:  NEGATIVE for ear, mouth and throat problems  RESP:  NEGATIVE for significant cough or SOB  BREAST:  NEGATIVE for masses, tenderness or discharge  CV:  NEGATIVE for chest pain, palpitations or peripheral edema  GI:  NEGATIVE for nausea, abdominal pain, heartburn, or  change in bowel habits  :  Negative   MUSCULOSKELETAL:  See HPI above  NEURO:  NEGATIVE for weakness, dizziness or paresthesias  ENDOCRINE:  NEGATIVE for temperature intolerance, skin/hair changes  HEME/ALLERGY/IMMUNE:  NEGATIVE for bleeding problems  PSYCHIATRIC:  NEGATIVE for changes in mood or affect    PHYSICAL EXAM:  LMP  (LMP Unknown)    GENERAL APPEARANCE: healthy, alert and no distress   SKIN: no suspicious lesions or rashes  NEURO: Normal strength and tone, mentation intact and speech normal  VASCULAR:  good pulses, and cappillary refill   LYMPH: no lymphadenopathy   PSYCH:  mentation appears normal and affect normal/bright  RESP: no increased work of breathing     KNEE EXAM:   Gait: walks with antalgic gait favoring right side  Alignment: normal   Effusion: moderate  ROM: 0-110    X-RAY:  Obtained 12/7/20,    No acute bony or soft tissue abnormality. No significant  medial or lateral joint space narrowing but there is mild lateral  compartment marginal bony spurring. Moderate medial patellofemoral  joint narrowing with minimal bony spurring. Possible mild  chondrocalcinosis. No joint space effusion.       MRI 12/7/20 right knee  1 Horizontal tearing of the body and posterior horn of the medial  meniscus. Horizontal tearing of the body and posterior horn of the  lateral meniscus.  2. 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 Partial-thickness fissuring of cartilage in the medial  compartment.  3. Moderate joint effusion       Impression:   Osteoarthritis right knee  Medial meniscus tear and lateral meniscus tear right knee,     Plan:  She is going out of town next week, and even though she has mechanical symptoms, she doesn't have them when the injection is working, and doesn't want arthroscopy at this time.    Injection therapy: Discussed findings and diagnosis with patient.  We talked about treatment options.  We decided that repeat corticosteroid injection would be the  best option.  Thus, With the patient's consent, right knee(s) injected intra-articularly with 80mg of Depomedrol and 4cc of local anesthetic after sterile prep.      Return to clinic as needed.      GIANNA Schultz MD  Dept. Orthopedic Surgery  [facility]     Large Joint Injection/Arthocentesis: R knee joint    Date/Time: 6/2/2021 4:19 PM  Performed by: Ho Mendoza  Authorized by: David Schultz MD     Indications:  Pain and osteoarthritis  Needle Size:  22 G  Guidance: landmark guided    Approach:  Anterolateral  Location:  Knee      Medications:  80 mg methylPREDNISolone 80 MG/ML; 4 mL lidocaine (PF) 1 %  Outcome:  Tolerated well, no immediate complications  Procedure discussed: discussed risks, benefits, and alternatives    Consent Given by:  Patient  Timeout: timeout called immediately prior to procedure    Prep: patient was prepped and draped in usual sterile fashion              Again, thank you for allowing me to participate in the care of your patient.        Sincerely,        David Schultz MD

## 2021-06-02 NOTE — PROGRESS NOTES
Large Joint Injection/Arthocentesis: R knee joint    Date/Time: 6/2/2021 4:19 PM  Performed by: Ho Mendoza  Authorized by: David Schultz MD     Indications:  Pain and osteoarthritis  Needle Size:  22 G  Guidance: landmark guided    Approach:  Anterolateral  Location:  Knee      Medications:  80 mg methylPREDNISolone 80 MG/ML; 4 mL lidocaine (PF) 1 %  Outcome:  Tolerated well, no immediate complications  Procedure discussed: discussed risks, benefits, and alternatives    Consent Given by:  Patient  Timeout: timeout called immediately prior to procedure    Prep: patient was prepped and draped in usual sterile fashion

## 2021-07-06 ENCOUNTER — TELEPHONE (OUTPATIENT)
Dept: FAMILY MEDICINE | Facility: CLINIC | Age: 81
End: 2021-07-06

## 2021-07-06 DIAGNOSIS — R60.9 EDEMA, UNSPECIFIED TYPE: ICD-10-CM

## 2021-07-06 DIAGNOSIS — E78.5 HYPERLIPIDEMIA, UNSPECIFIED HYPERLIPIDEMIA TYPE: ICD-10-CM

## 2021-07-06 DIAGNOSIS — I10 ESSENTIAL HYPERTENSION WITH GOAL BLOOD PRESSURE LESS THAN 140/90: ICD-10-CM

## 2021-07-08 NOTE — TELEPHONE ENCOUNTER
Routing refill requests to provider for review/approval because: Labs not current:  BMP, Lipids  Needs appt    Seema CARLOS RN, BSN

## 2021-07-13 RX ORDER — AMLODIPINE BESYLATE 10 MG/1
10 TABLET ORAL DAILY
Qty: 30 TABLET | Refills: 0 | Status: SHIPPED | OUTPATIENT
Start: 2021-07-13 | End: 2021-07-19

## 2021-07-13 RX ORDER — ATORVASTATIN CALCIUM 20 MG/1
20 TABLET, FILM COATED ORAL DAILY
Qty: 30 TABLET | Refills: 0 | Status: SHIPPED | OUTPATIENT
Start: 2021-07-13 | End: 2021-07-19

## 2021-07-13 RX ORDER — HYDROCHLOROTHIAZIDE 25 MG/1
25 TABLET ORAL DAILY
Qty: 30 TABLET | Refills: 0 | Status: SHIPPED | OUTPATIENT
Start: 2021-07-13 | End: 2021-07-19

## 2021-07-14 ENCOUNTER — MYC MEDICAL ADVICE (OUTPATIENT)
Dept: FAMILY MEDICINE | Facility: CLINIC | Age: 81
End: 2021-07-14

## 2021-07-14 DIAGNOSIS — I10 ESSENTIAL HYPERTENSION WITH GOAL BLOOD PRESSURE LESS THAN 140/90: ICD-10-CM

## 2021-07-15 NOTE — TELEPHONE ENCOUNTER
Routing refill request to provider for review/approval because:  Labs not current: Last BMP was done 7/6/20.  Advise.  Andrei

## 2021-07-17 ASSESSMENT — ENCOUNTER SYMPTOMS
HEADACHES: 0
SHORTNESS OF BREATH: 0
DIARRHEA: 0
HEARTBURN: 0
DYSURIA: 0
BREAST MASS: 0
HEMATURIA: 0
NERVOUS/ANXIOUS: 0
DIZZINESS: 0
MYALGIAS: 0
CHILLS: 0
ARTHRALGIAS: 1
PALPITATIONS: 0
COUGH: 0
CONSTIPATION: 0
NAUSEA: 0
FREQUENCY: 0
SORE THROAT: 0
HEMATOCHEZIA: 0
JOINT SWELLING: 0
ABDOMINAL PAIN: 0
FEVER: 0
WEAKNESS: 0
EYE PAIN: 0
PARESTHESIAS: 0

## 2021-07-17 ASSESSMENT — ACTIVITIES OF DAILY LIVING (ADL): CURRENT_FUNCTION: NO ASSISTANCE NEEDED

## 2021-07-19 ENCOUNTER — OFFICE VISIT (OUTPATIENT)
Dept: FAMILY MEDICINE | Facility: CLINIC | Age: 81
End: 2021-07-19
Payer: COMMERCIAL

## 2021-07-19 VITALS
HEART RATE: 57 BPM | BODY MASS INDEX: 25.07 KG/M2 | DIASTOLIC BLOOD PRESSURE: 82 MMHG | HEIGHT: 61 IN | OXYGEN SATURATION: 96 % | SYSTOLIC BLOOD PRESSURE: 130 MMHG | TEMPERATURE: 97.1 F | WEIGHT: 132.8 LBS

## 2021-07-19 DIAGNOSIS — M54.2 CHRONIC NECK PAIN: ICD-10-CM

## 2021-07-19 DIAGNOSIS — Z00.00 ENCOUNTER FOR MEDICARE ANNUAL WELLNESS EXAM: Primary | ICD-10-CM

## 2021-07-19 DIAGNOSIS — H90.3 SENSORINEURAL HEARING LOSS, BILATERAL: ICD-10-CM

## 2021-07-19 DIAGNOSIS — Z12.31 VISIT FOR SCREENING MAMMOGRAM: ICD-10-CM

## 2021-07-19 DIAGNOSIS — I10 ESSENTIAL HYPERTENSION WITH GOAL BLOOD PRESSURE LESS THAN 140/90: ICD-10-CM

## 2021-07-19 DIAGNOSIS — E87.6 HYPOKALEMIA: ICD-10-CM

## 2021-07-19 DIAGNOSIS — R60.9 EDEMA, UNSPECIFIED TYPE: ICD-10-CM

## 2021-07-19 DIAGNOSIS — E78.5 HYPERLIPIDEMIA, UNSPECIFIED HYPERLIPIDEMIA TYPE: ICD-10-CM

## 2021-07-19 DIAGNOSIS — G89.29 CHRONIC NECK PAIN: ICD-10-CM

## 2021-07-19 LAB
ALT SERPL W P-5'-P-CCNC: 21 U/L (ref 0–50)
ANION GAP SERPL CALCULATED.3IONS-SCNC: 6 MMOL/L (ref 3–14)
BUN SERPL-MCNC: 13 MG/DL (ref 7–30)
CALCIUM SERPL-MCNC: 9.3 MG/DL (ref 8.5–10.1)
CHLORIDE BLD-SCNC: 99 MMOL/L (ref 94–109)
CHOLEST SERPL-MCNC: 213 MG/DL
CO2 SERPL-SCNC: 28 MMOL/L (ref 20–32)
CREAT SERPL-MCNC: 0.65 MG/DL (ref 0.52–1.04)
FASTING STATUS PATIENT QL REPORTED: YES
GFR SERPL CREATININE-BSD FRML MDRD: 85 ML/MIN/1.73M2
GLUCOSE BLD-MCNC: 84 MG/DL (ref 70–99)
HDLC SERPL-MCNC: 85 MG/DL
LDLC SERPL CALC-MCNC: 115 MG/DL
NONHDLC SERPL-MCNC: 128 MG/DL
POTASSIUM BLD-SCNC: 3.2 MMOL/L (ref 3.4–5.3)
SODIUM SERPL-SCNC: 133 MMOL/L (ref 133–144)
TRIGL SERPL-MCNC: 63 MG/DL

## 2021-07-19 PROCEDURE — 80048 BASIC METABOLIC PNL TOTAL CA: CPT | Performed by: NURSE PRACTITIONER

## 2021-07-19 PROCEDURE — G0438 PPPS, INITIAL VISIT: HCPCS | Performed by: NURSE PRACTITIONER

## 2021-07-19 PROCEDURE — 80061 LIPID PANEL: CPT | Performed by: NURSE PRACTITIONER

## 2021-07-19 PROCEDURE — 92551 PURE TONE HEARING TEST AIR: CPT | Performed by: NURSE PRACTITIONER

## 2021-07-19 PROCEDURE — 84460 ALANINE AMINO (ALT) (SGPT): CPT | Performed by: NURSE PRACTITIONER

## 2021-07-19 PROCEDURE — 36415 COLL VENOUS BLD VENIPUNCTURE: CPT | Performed by: NURSE PRACTITIONER

## 2021-07-19 PROCEDURE — 99213 OFFICE O/P EST LOW 20 MIN: CPT | Mod: 25 | Performed by: NURSE PRACTITIONER

## 2021-07-19 RX ORDER — HYDROCHLOROTHIAZIDE 25 MG/1
25 TABLET ORAL DAILY
Qty: 90 TABLET | Refills: 3 | Status: SHIPPED | OUTPATIENT
Start: 2021-07-19 | End: 2022-08-18

## 2021-07-19 RX ORDER — AMLODIPINE BESYLATE 10 MG/1
10 TABLET ORAL DAILY
Qty: 90 TABLET | Refills: 3 | Status: SHIPPED | OUTPATIENT
Start: 2021-07-19 | End: 2022-06-03

## 2021-07-19 RX ORDER — LOSARTAN POTASSIUM 100 MG/1
100 TABLET ORAL DAILY
Qty: 90 TABLET | Refills: 3 | Status: SHIPPED | OUTPATIENT
Start: 2021-07-19 | End: 2022-05-09

## 2021-07-19 RX ORDER — POTASSIUM CHLORIDE 1500 MG/1
20 TABLET, EXTENDED RELEASE ORAL DAILY
Qty: 90 TABLET | Refills: 3 | Status: SHIPPED | OUTPATIENT
Start: 2021-07-19 | End: 2022-06-03

## 2021-07-19 RX ORDER — ATORVASTATIN CALCIUM 20 MG/1
20 TABLET, FILM COATED ORAL DAILY
Qty: 90 TABLET | Refills: 3 | Status: SHIPPED | OUTPATIENT
Start: 2021-07-19 | End: 2022-10-27

## 2021-07-19 RX ORDER — LOSARTAN POTASSIUM 100 MG/1
TABLET ORAL
Qty: 30 TABLET | Refills: 0 | OUTPATIENT
Start: 2021-07-19

## 2021-07-19 ASSESSMENT — ENCOUNTER SYMPTOMS
DIARRHEA: 0
EYE PAIN: 0
COUGH: 0
NERVOUS/ANXIOUS: 0
FEVER: 0
HEMATOCHEZIA: 0
DYSURIA: 0
DIZZINESS: 0
ABDOMINAL PAIN: 0
SHORTNESS OF BREATH: 0
PALPITATIONS: 0
ARTHRALGIAS: 1
HEADACHES: 0
NAUSEA: 0
PARESTHESIAS: 0
CHILLS: 0
HEMATURIA: 0
HEARTBURN: 0
MYALGIAS: 0
JOINT SWELLING: 0
WEAKNESS: 0
FREQUENCY: 0
SORE THROAT: 0
CONSTIPATION: 0
BREAST MASS: 0

## 2021-07-19 ASSESSMENT — ACTIVITIES OF DAILY LIVING (ADL): CURRENT_FUNCTION: NO ASSISTANCE NEEDED

## 2021-07-19 ASSESSMENT — MIFFLIN-ST. JEOR: SCORE: 1015.79

## 2021-07-19 NOTE — PROGRESS NOTES
"SUBJECTIVE:   Janette Rahman is a 80 year old female who presents for Preventive Visit.      Patient has been advised of split billing requirements and indicates understanding: Yes   Are you in the first 12 months of your Medicare coverage?  No    Healthy Habits:     In general, how would you rate your overall health?  Excellent    Frequency of exercise:  2-3 days/week    Duration of exercise:  30-45 minutes    Do you usually eat at least 4 servings of fruit and vegetables a day, include whole grains    & fiber and avoid regularly eating high fat or \"junk\" foods?  Yes    Taking medications regularly:  Yes    Medication side effects:  None    Ability to successfully perform activities of daily living:  No assistance needed    Home Safety:  No safety concerns identified    Hearing Impairment:  Find that men's voices are easier to understand than woman's    In the past 6 months, have you been bothered by leaking of urine?  No    In general, how would you rate your overall mental or emotional health?  Excellent      PHQ-2 Total Score: 0    Additional concerns today:  Yes    Do you feel safe in your environment? Yes    Have you ever done Advance Care Planning? (For example, a Health Directive, POLST, or a discussion with a medical provider or your loved ones about your wishes): Yes, patient states has an Advance Care Planning document and will bring a copy to the clinic.      Right Ear:      1000 Hz RESPONSE- on Level: 40 db (Conditioning sound)   1000 Hz: RESPONSE- on Level: 20 db   2000 Hz: RESPONSE- on Level: 40 db   4000 Hz: RESPONSE- on Level: tone not heard    Left Ear:      4000 Hz: RESPONSE- on Level: tone not heard   2000 Hz: RESPONSE- on Level: 40 db   1000 Hz: RESPONSE- on Level:   20 db     500 Hz: RESPONSE- on Level: 25 db    Right Ear:    500 Hz: RESPONSE- on Level: 25 db    Hearing Acuity: REFER    Hearing Assessment: abnormal--refer to audiology     Fall risk  Fallen 2 or more times in the past year?: " No  Any fall with injury in the past year?: No    Cognitive Screening   1) Repeat 3 items (Leader, Season, Table)    2) Clock draw: NORMAL  3) 3 item recall: Recalls 3 objects  Results: 3 items recalled: COGNITIVE IMPAIRMENT LESS LIKELY    Mini-CogTM Copyright S Tom. Licensed by the author for use in [facility]; reprinted with permission (aide@[facility]). All rights reserved.      Do you have sleep apnea, excessive snoring or daytime drowsiness?: no    Reviewed and updated as needed this visit by clinical staff  Tobacco  Allergies  Meds   Med Hx  Surg Hx  Fam Hx  Soc Hx        Reviewed and updated as needed this visit by Provider                Social History     Tobacco Use     Smoking status: Never Smoker     Smokeless tobacco: Never Used   Substance Use Topics     Alcohol use: Not Currently     Alcohol/week: 1.7 - 2.5 standard drinks     Comment: 3 glasses per week     If you drink alcohol do you typically have >3 drinks per day or >7 drinks per week? No    Alcohol Use 7/17/2021   Prescreen: >3 drinks/day or >7 drinks/week? No   Prescreen: >3 drinks/day or >7 drinks/week? -   No flowsheet data found.        Hyperlipidemia Follow-Up      Are you regularly taking any medication or supplement to lower your cholesterol?   Yes- atorvastatin 20mg     Are you having muscle aches or other side effects that you think could be caused by your cholesterol lowering medication?  Unknown but has a sore neck a lot of the time. Recently has cramping in lower legs at night.     Hypertension Follow-up      Do you check your blood pressure regularly outside of the clinic? Yes     Are you following a low salt diet? Yes    Are your blood pressures ever more than 140 on the top number (systolic) OR more   than 90 on the bottom number (diastolic), for example 140/90? No      Current providers sharing in care for this patient include:   Patient Care Team:  Alyssa Lopez MD as PCP - General (Internal  Medicine)  Mariya Jordan MD as MD (Internal Medicine)  Kuldip Yeh MD as Medical Student (Student in organized health care education/training program)  Kuldip Yeh MD as Medical Student (Student in organized health care education/training program)  Stephon Quan MD as MD (Family Practice)  Meka Chris MD as MD (Internal Medicine)  Radha Enamorado MD as Medical Student (Student in organized health care education/training program)  Shaan Root MD as MD (Dermapathology)  Alyssa Lopez MD as MD (Internal Medicine)  Nnamdi Bullard MD as MD (Colon and Rectal Surgery)  Sejal Gibson RN as Registered Nurse  David Schultz MD as Assigned Musculoskeletal Provider  Giselle Lemus APRN CNP as Assigned PCP    The following health maintenance items are reviewed in Epic and correct as of today:  Health Maintenance Due   Topic Date Due     ANNUAL REVIEW OF HM ORDERS  Never done     COVID-19 Vaccine (1) Never done     HEPATITIS C SCREENING  Never done     ZOSTER IMMUNIZATION (2 of 3) 12/31/2013     MAMMO SCREENING  06/14/2020     FALL RISK ASSESSMENT  09/25/2020     BMP  07/06/2021     LIPID  07/06/2021     BP Readings from Last 3 Encounters:   07/19/21 130/82   06/02/21 130/75   12/10/20 134/69    Wt Readings from Last 3 Encounters:   07/19/21 60.2 kg (132 lb 12.8 oz)   06/02/21 62.1 kg (137 lb)   12/07/20 62.1 kg (137 lb)                  Pneumonia Vaccine:Adults age 65+ who received Pneumovax (PPSV23) at 65 years or older: Should be given PCV13 > 1 year after their most recent PPSV23  Mammogram Screening: Mammogram Screening - Patient over age 75, has elected to continue with screening.    Mammogram Screening - Patient over age 75, has elected to continue with screening.  Pertinent mammograms are reviewed under the imaging tab.    Review of Systems   Constitutional: Negative for chills and fever.   HENT: Negative for congestion, ear pain, hearing loss and sore  "throat.    Eyes: Negative for pain and visual disturbance.   Respiratory: Negative for cough and shortness of breath.    Cardiovascular: Negative for chest pain, palpitations and peripheral edema.   Gastrointestinal: Negative for abdominal pain, constipation, diarrhea, heartburn, hematochezia and nausea.   Breasts:  Negative for tenderness, breast mass and discharge.   Genitourinary: Negative for dysuria, frequency, genital sores, hematuria, pelvic pain, urgency, vaginal bleeding and vaginal discharge.   Musculoskeletal: Positive for arthralgias. Negative for joint swelling and myalgias.   Skin: Negative for rash.   Neurological: Negative for dizziness, weakness, headaches and paresthesias.   Psychiatric/Behavioral: Negative for mood changes. The patient is not nervous/anxious.      Constitutional, HEENT, cardiovascular, pulmonary, GI, , musculoskeletal, neuro, skin, endocrine and psych systems are negative, except as otherwise noted.    OBJECTIVE:   BP (!) 140/82   Pulse 57   Temp 97.1  F (36.2  C) (Tympanic)   Ht 1.543 m (5' 0.75\")   Wt 60.2 kg (132 lb 12.8 oz)   LMP  (LMP Unknown)   SpO2 96%   BMI 25.30 kg/m   Estimated body mass index is 25.3 kg/m  as calculated from the following:    Height as of this encounter: 1.543 m (5' 0.75\").    Weight as of this encounter: 60.2 kg (132 lb 12.8 oz).  Physical Exam  GENERAL: healthy, alert and no distress  EYES: Eyes grossly normal to inspection, PERRL and conjunctivae and sclerae normal  HENT: ear canals and TM's normal, nose and mouth without ulcers or lesions  NECK: no adenopathy, no asymmetry, masses, or scars and thyroid normal to palpation  RESP: lungs clear to auscultation - no rales, rhonchi or wheezes  CV: regular rate and rhythm, normal S1 S2, no S3 or S4, no murmur, click or rub, no peripheral edema and peripheral pulses strong  ABDOMEN: soft, nontender, no hepatosplenomegaly, no masses and bowel sounds normal  MS: no gross musculoskeletal defects " "noted, no edema  SKIN: no suspicious lesions or rashes  NEURO: Normal strength and tone, mentation intact and speech normal  PSYCH: mentation appears normal, affect normal/bright    Diagnostic Test Results:  Labs reviewed in Epic    ASSESSMENT / PLAN:   1. Encounter for Medicare annual wellness exam      2. Visit for screening mammogram  Ordered mammmogram    3. Essential hypertension with goal blood pressure less than 140/90  controlled  - Basic metabolic panel  (Ca, Cl, CO2, Creat, Gluc, K, Na, BUN)  - Refilled amLODIPine (NORVASC) 10 MG tablet; Take 1 tablet (10 mg) by mouth daily  Dispense: 90 tablet; Refill: 3  - Refilled losartan (COZAAR) 100 MG tablet; Take 1 tablet (100 mg) by mouth daily  Dispense: 90 tablet; Refill: 3    4. Hyperlipidemia, unspecified hyperlipidemia type  Tolerating statin  - Lipid panel reflex to direct LDL Fasting  - Refilled atorvastatin (LIPITOR) 20 MG tablet; Take 1 tablet (20 mg) by mouth daily  Dispense: 90 tablet; Refill: 3  - ALT; Future    5. Edema, unspecified type  stable  - hydrochlorothiazide (HYDRODIURIL) 25 MG tablet; Take 1 tablet (25 mg) by mouth daily  Dispense: 90 tablet; Refill: 3    6. Hypokalemia    - potassium chloride ER (K-TAB) 20 MEQ CR tablet; Take 1 tablet (20 mEq) by mouth daily  Dispense: 90 tablet; Refill: 3    7. Sensorineural hearing loss, bilateral    - Adult Audiology Referral; Future    8. Chronic neck pain  Patient has orthopedic that she will see  Has had workup in the past- was told she had bone spurs and arthritis       Patient has been advised of split billing requirements and indicates understanding: Yes  COUNSELING:  Reviewed preventive health counseling, as reflected in patient instructions       Regular exercise       Healthy diet/nutrition       Osteoporosis prevention/bone health    Estimated body mass index is 25.3 kg/m  as calculated from the following:    Height as of this encounter: 1.543 m (5' 0.75\").    Weight as of this encounter: 60.2 " kg (132 lb 12.8 oz).        She reports that she has never smoked. She has never used smokeless tobacco.      Appropriate preventive services were discussed with this patient, including applicable screening as appropriate for cardiovascular disease, diabetes, osteopenia/osteoporosis, and glaucoma.  As appropriate for age/gender, discussed screening for colorectal cancer, prostate cancer, breast cancer, and cervical cancer. Checklist reviewing preventive services available has been given to the patient.    Reviewed patients plan of care and provided an AVS. The Basic Care Plan (routine screening as documented in Health Maintenance) for Janette meets the Care Plan requirement. This Care Plan has been established and reviewed with the Patient.    Counseling Resources:  ATP IV Guidelines  Pooled Cohorts Equation Calculator  Breast Cancer Risk Calculator  Breast Cancer: Medication to Reduce Risk  FRAX Risk Assessment  ICSI Preventive Guidelines  Dietary Guidelines for Americans, 2010  USDA's MyPlate  ASA Prophylaxis  Lung CA Screening    VERÓNICA Aguilar CNP  [facility]    Identified Health Risks:    The patient was provided with written information regarding signs of hearing loss.

## 2021-07-19 NOTE — PATIENT INSTRUCTIONS
Patient Education   Personalized Prevention Plan  You are due for the preventive services outlined below.  Your care team is available to assist you in scheduling these services.  If you have already completed any of these items, please share that information with your care team to update in your medical record.  Health Maintenance Due   Topic Date Due     ANNUAL REVIEW OF HM ORDERS  Never done     COVID-19 Vaccine (1) Never done     Hepatitis C Screening  Never done     Zoster (Shingles) Vaccine (2 of 3) 12/31/2013     Mammogram  06/14/2020     FALL RISK ASSESSMENT  09/25/2020     Basic Metabolic Panel  07/06/2021     Cholesterol Lab  07/06/2021       Signs of Hearing Loss      Hearing much better with one ear can be a sign of hearing loss.   Hearing loss is a problem shared by many people. In fact, it is one of the most common health problems, particularly as people age. Most people age 65 and older have some hearing loss. By age 80, almost everyone does. Hearing loss often occurs slowly over the years. So you may not realize your hearing has gotten worse.  Have your hearing checked  Call your healthcare provider if you:    Have to strain to hear normal conversation    Have to watch other people s faces very carefully to follow what they re saying    Need to ask people to repeat what they ve said    Often misunderstand what people are saying    Turn the volume of the television or radio up so high that others complain    Feel that people are mumbling when they re talking to you    Find that the effort to hear leaves you feeling tired and irritated    Notice, when using the phone, that you hear better with one ear than the other  [employer] last reviewed this educational content on 1/1/2020 2000-2021 [employer]. All rights reserved. This information is not intended as a substitute for professional medical care. Always follow your healthcare professional's instructions.            - - -

## 2021-08-11 ENCOUNTER — OFFICE VISIT (OUTPATIENT)
Dept: AUDIOLOGY | Facility: CLINIC | Age: 81
End: 2021-08-11
Attending: NURSE PRACTITIONER
Payer: COMMERCIAL

## 2021-08-11 DIAGNOSIS — H90.3 SENSORINEURAL HEARING LOSS, BILATERAL: ICD-10-CM

## 2021-08-11 DIAGNOSIS — R42 DIZZINESS: Primary | ICD-10-CM

## 2021-08-11 PROCEDURE — 99207 PR NO CHARGE LOS: CPT | Performed by: AUDIOLOGIST

## 2021-08-11 PROCEDURE — 92550 TYMPANOMETRY & REFLEX THRESH: CPT | Performed by: AUDIOLOGIST

## 2021-08-11 PROCEDURE — 92557 COMPREHENSIVE HEARING TEST: CPT | Performed by: AUDIOLOGIST

## 2021-08-11 NOTE — PROGRESS NOTES
AUDIOLOGY REPORT    SUBJECTIVE:  Janette Rahman is a 80 year old female who was seen in the Audiology [facility] on 8/11/21 for audiologic evaluation, referred by Alyssa SANCHES CNP.  The patient reports a gradual decline in hearing as well as some episodes of true vertigo brought on by movement. Patient reports they are under the care of a therapist for the dizziness. The patient denies  bilateral tinnitus, bilateral otalgia, bilateral drainage, bilateral aural fullness, family history of hearing loss, and history of noise exposure. The patient notes difficulty with communication in a variety of listening situations especially when in background noise. Patient was unaccompanied to today's visit.     Abuse Screening:  Do you feel unsafe at home or work/school? No  Do you feel threatened by someone? No  Does anyone try to keep you from having contact with others, or doing things outside of your home? No  Physical signs of abuse present? No     OBJECTIVE:    Otoscopic exam indicates ears are clear of cerumen bilaterally   NOTE: There is a small bump on the entrance of the right ear canal, Janette was made aware of this.      Pure Tone Thresholds assessed using standard techniques  audiometry with good  reliability from 250-8000 Hz bilaterally using insert earphones and circumaural headphones     RIGHT:  normal hearing sensitivity through 1500 Hz the a mild-moderate sensorineural hearing loss    LEFT:    normal hearing sensitivity through 1500 Hz the a mild-moderate sensorineural hearing loss    NOTE: Change in transducers did not merit a change in thresholds.     Tympanogram:    RIGHT: restricted eardrum mobility (Type B)    LEFT:   restricted eardrum mobility (Type As)    Reflexes (reported by stimulus ear): 1000 Hz  RIGHT: Ipsilateral is absent at frequencies tested  RIGHT: Contralateral is absent at frequencies tested  LEFT:   Ipsilateral is absent at frequencies  tested  LEFT:   Contralateral is absent at frequencies tested    Speech Reception Threshold:    RIGHT: 30 dB HL    LEFT:   20 dB HL    Word Recognition Score:     RIGHT: 100% at 70 dB HL using NU-6 recorded word list.    LEFT:   96% at 60 dB HL using NU-6 recorded word list.    ASSESSMENT:     ICD-10-CM    1. Sensorineural hearing loss, bilateral  H90.3 Adult Audiology Referral       Today s results were discussed with the patient in detail. Patient requested and was provided a copy of her audiogram.     PLAN:  Patient was counseled regarding hearing loss and impact on communication.  Patient is a good candidate for amplification at this time. Handout on good communication strategies, and hearing aid use was given to patient. It is recommended that the patient return for a hearing aid consultation.  Please call this clinic with questions regarding these results or recommendations.    Ry Grant CCC-A  Licensed Audiologist #[id number]  8/11/2021    CC: Alyssa SANCHES CNP

## 2021-09-09 ENCOUNTER — HOSPITAL ENCOUNTER (OUTPATIENT)
Dept: MAMMOGRAPHY | Facility: CLINIC | Age: 81
Discharge: HOME | End: 2021-09-09
Attending: NURSE PRACTITIONER | Admitting: NURSE PRACTITIONER
Payer: COMMERCIAL

## 2021-09-09 DIAGNOSIS — Z12.31 VISIT FOR SCREENING MAMMOGRAM: ICD-10-CM

## 2021-09-09 PROCEDURE — 77067 SCR MAMMO BI INCL CAD: CPT

## 2021-10-03 ENCOUNTER — HEALTH MAINTENANCE LETTER (OUTPATIENT)
Age: 81
End: 2021-10-03

## 2022-05-03 NOTE — PROGRESS NOTES
SUBJECTIVE:  Janette Rahman is a 81 year old female who is seen as self referral for right shoulder pain that started  that started/occurred  1 year ago.  Previous right rotator cuff repair and rotator cuff repair revision 1997 and 2007    Present symptoms: pain just sitting. Has to hold arm sometimes for comfort  Pain with overhead activities.  Worse pain bringing it down.   Feels weak, feels like it's going to pop  No noises.  Pain anteriorly and laterally  Night pain.   Pain sneezing    Associated symptoms: none    Treatment up to this point: none.  Prior history of related problems: Right rotator cuff repair in Feb 2006, 1997     Significant Orthopedic past medical history: osteoarthritis knee with medial meniscus tear and lateral meniscus tear with mechanical symptoms.   1 Horizontal tearing of the body and posterior horn of the medial  meniscus. Horizontal tearing of the body and posterior horn of the  lateral meniscus.  2. 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 Partial-thickness fissuring of cartilage in the medial  compartment.Has has corticosteroid injection   Previous corticosteroid injection for this 6/2/21    Past Medical History:   Diagnosis Date     HLD (hyperlipidaemia)      HTN (hypertension)      Osteoporosis        Past Surgical History:   Procedure Laterality Date     ADENOIDECTOMY       BUNIONECTOMY       CARPAL TUNNEL RELEASE RT/LT       meniscal tear knee Right 12/2020     ROTATOR CUFF REPAIR RT/LT  2/2006     SHOULDER SURGERY      impingment     TONSILLECTOMY         REVIEW OF SYSTEMS:  CONSTITUTIONAL:  NEGATIVE for fever, chills, change in weight  INTEGUMENTARY/SKIN:  NEGATIVE for worrisome rashes, moles or lesions  EYES:  NEGATIVE for vision changes or irritation  ENT/MOUTH:  NEGATIVE for ear, mouth and throat problems  RESP:  NEGATIVE for significant cough or SOB  BREAST:  NEGATIVE for masses, tenderness or discharge  CV:  NEGATIVE for chest pain,  "palpitations or peripheral edema  GI:  NEGATIVE for nausea, abdominal pain, heartburn, or change in bowel habits  :  Negative   MUSCULOSKELETAL:  See HPI above  NEURO:  NEGATIVE for weakness, dizziness or paresthesias  ENDOCRINE:  NEGATIVE for temperature intolerance, skin/hair changes  HEME/ALLERGY/IMMUNE:  NEGATIVE for bleeding problems  PSYCHIATRIC:  NEGATIVE for changes in mood or affect    OBJECTIVE:  /76 (BP Location: Left arm, Patient Position: Sitting, Cuff Size: Adult Regular)   Pulse 62   Ht 1.543 m (5' 0.75\")   Wt 59.9 kg (132 lb)   LMP  (LMP Unknown)   SpO2 98%   BMI 25.15 kg/m       GENERAL: healthy, alert and no distress  GAIT: normal   SKIN: no suspicious lesions or rashes  NEURO: Normal strength and tone, mentation intact and speech normal  VASCULAR:  normal pulses and cappillary refill   PSYCH:  mentation appears normal and affect normal/bright    MUSCULOSKELETAL:    NECK:  Cervical range of motion: limited,  causes pain in neck, does not cause pain into shoulder or reproduce shoulder pain  Sensory deficits:  none  Motor deficits:  none    SHOULDER:  Shoulder Inspection: moderate muscle atrophy supraspinatus     Tender: proximal bicep tendon and greater tuberosity  Non-tender: AC joint  Range of Motion:   Active:forward flexion 130 degrees, external rotation  70 degrees   Passive: forward flexion 130 degrees, internal rotation  T9  Impingement: all grade 2 positive  Strength: forward flexion 4+/5, External rotation 5/5  Liftoff: Able Bear Hug: Negative      X-RAY INTERPRETATION Radiographs from today were reviewed independently with the patient, and demonstrate: significant glenohumeral joint narrowing. Evidence of previous rotator cuff repair with metal suture anchor in place. Flat acromion. Humeral head not particularly elevated.      MRI:    none    ASSESSMENT    ICD-10-CM    1. Chronic right shoulder pain  M25.511 XR Shoulder Right G/E 3 Views    G89.29 Physical Therapy Referral    "  Orthopedic  Referral   2. Osteoarthritis of glenohumeral joint, right  M19.011 Physical Therapy Referral     Orthopedic  Referral   3. Nontraumatic tear of right rotator cuff, unspecified tear extent  M75.101 Physical Therapy Referral     Orthopedic  Referral     I think her rotator cuff is compromised, but may not have a massive recurrent tear, based on exam.    PLAN:   Image guided corticosteroid injection ordered for the right glenohumeral joint   physical therapy ordered  She should gauge how much pain goes away with this injection.   She has issues with a finger cyst and right knee issues that she will come in a different time for.     GIANNA Schultz MD  Dept. Orthopedic Surgery  [facility]

## 2022-05-04 ENCOUNTER — OFFICE VISIT (OUTPATIENT)
Dept: ORTHOPEDICS | Facility: CLINIC | Age: 82
End: 2022-05-04
Payer: COMMERCIAL

## 2022-05-04 VITALS
HEIGHT: 61 IN | SYSTOLIC BLOOD PRESSURE: 129 MMHG | BODY MASS INDEX: 24.92 KG/M2 | HEART RATE: 62 BPM | WEIGHT: 132 LBS | OXYGEN SATURATION: 98 % | DIASTOLIC BLOOD PRESSURE: 76 MMHG

## 2022-05-04 DIAGNOSIS — G89.29 CHRONIC RIGHT SHOULDER PAIN: Primary | ICD-10-CM

## 2022-05-04 DIAGNOSIS — M25.511 CHRONIC RIGHT SHOULDER PAIN: Primary | ICD-10-CM

## 2022-05-04 DIAGNOSIS — M75.101 NONTRAUMATIC TEAR OF RIGHT ROTATOR CUFF, UNSPECIFIED TEAR EXTENT: ICD-10-CM

## 2022-05-04 DIAGNOSIS — M19.011 OSTEOARTHRITIS OF GLENOHUMERAL JOINT, RIGHT: ICD-10-CM

## 2022-05-04 PROCEDURE — 99213 OFFICE O/P EST LOW 20 MIN: CPT | Performed by: ORTHOPAEDIC SURGERY

## 2022-05-04 NOTE — LETTER
5/4/2022         RE: Janette Rahman  [address] [place] [address]        Dear Colleague,    Thank you for referring your patient, Janette Rahman, to the [facility]. Please see a copy of my visit note below.    SUBJECTIVE:  Janette Rahman is a 81 year old female who is seen as self referral for right shoulder pain that started  that started/occurred  1 year ago.  Previous right rotator cuff repair and rotator cuff repair revision 1997 and 2007    Present symptoms: pain just sitting. Has to hold arm sometimes for comfort  Pain with overhead activities.  Worse pain bringing it down.   Feels weak, feels like it's going to pop  No noises.  Pain anteriorly and laterally  Night pain.   Pain sneezing    Associated symptoms: none    Treatment up to this point: none.  Prior history of related problems: Right rotator cuff repair in Feb 2006, 1997     Significant Orthopedic past medical history: osteoarthritis knee with medial meniscus tear and lateral meniscus tear with mechanical symptoms.   1 Horizontal tearing of the body and posterior horn of the medial  meniscus. Horizontal tearing of the body and posterior horn of the  lateral meniscus.  2. 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 Partial-thickness fissuring of cartilage in the medial  compartment.Has has corticosteroid injection   Previous corticosteroid injection for this 6/2/21    Past Medical History:   Diagnosis Date     HLD (hyperlipidaemia)      HTN (hypertension)      Osteoporosis        Past Surgical History:   Procedure Laterality Date     ADENOIDECTOMY       BUNIONECTOMY       CARPAL TUNNEL RELEASE RT/LT       meniscal tear knee Right 12/2020     ROTATOR CUFF REPAIR RT/LT  2/2006     SHOULDER SURGERY      impingment     TONSILLECTOMY         REVIEW OF SYSTEMS:  CONSTITUTIONAL:  NEGATIVE for fever, chills, change in weight  INTEGUMENTARY/SKIN:  NEGATIVE for worrisome rashes,  "moles or lesions  EYES:  NEGATIVE for vision changes or irritation  ENT/MOUTH:  NEGATIVE for ear, mouth and throat problems  RESP:  NEGATIVE for significant cough or SOB  BREAST:  NEGATIVE for masses, tenderness or discharge  CV:  NEGATIVE for chest pain, palpitations or peripheral edema  GI:  NEGATIVE for nausea, abdominal pain, heartburn, or change in bowel habits  :  Negative   MUSCULOSKELETAL:  See HPI above  NEURO:  NEGATIVE for weakness, dizziness or paresthesias  ENDOCRINE:  NEGATIVE for temperature intolerance, skin/hair changes  HEME/ALLERGY/IMMUNE:  NEGATIVE for bleeding problems  PSYCHIATRIC:  NEGATIVE for changes in mood or affect    OBJECTIVE:  /76 (BP Location: Left arm, Patient Position: Sitting, Cuff Size: Adult Regular)   Pulse 62   Ht 1.543 m (5' 0.75\")   Wt 59.9 kg (132 lb)   LMP  (LMP Unknown)   SpO2 98%   BMI 25.15 kg/m       GENERAL: healthy, alert and no distress  GAIT: normal   SKIN: no suspicious lesions or rashes  NEURO: Normal strength and tone, mentation intact and speech normal  VASCULAR:  normal pulses and cappillary refill   PSYCH:  mentation appears normal and affect normal/bright    MUSCULOSKELETAL:    NECK:  Cervical range of motion: limited,  causes pain in neck, does not cause pain into shoulder or reproduce shoulder pain  Sensory deficits:  none  Motor deficits:  none    SHOULDER:  Shoulder Inspection: moderate muscle atrophy supraspinatus     Tender: proximal bicep tendon and greater tuberosity  Non-tender: AC joint  Range of Motion:   Active:forward flexion 130 degrees, external rotation  70 degrees   Passive: forward flexion 130 degrees, internal rotation  T9  Impingement: all grade 2 positive  Strength: forward flexion 4+/5, External rotation 5/5  Liftoff: Able Bear Hug: Negative      X-RAY INTERPRETATION Radiographs from today were reviewed independently with the patient, and demonstrate: significant glenohumeral joint narrowing. Evidence of previous rotator " cuff repair with metal suture anchor in place. Flat acromion. Humeral head not particularly elevated.      MRI:    none    ASSESSMENT    ICD-10-CM    1. Chronic right shoulder pain  M25.511 XR Shoulder Right G/E 3 Views    G89.29 Physical Therapy Referral     Orthopedic  Referral   2. Osteoarthritis of glenohumeral joint, right  M19.011 Physical Therapy Referral     Orthopedic  Referral   3. Nontraumatic tear of right rotator cuff, unspecified tear extent  M75.101 Physical Therapy Referral     Orthopedic  Referral     I think her rotator cuff is compromised, but may not have a massive recurrent tear, based on exam.    PLAN:   Image guided corticosteroid injection ordered for the right glenohumeral joint   physical therapy ordered  She should gauge how much pain goes away with this injection.   She has issues with a finger cyst and right knee issues that she will come in a different time for.     GIANNA Schultz MD  Dept. Orthopedic Surgery  [facility]        Again, thank you for allowing me to participate in the care of your patient.        Sincerely,        David Schultz MD

## 2022-05-05 DIAGNOSIS — I10 ESSENTIAL HYPERTENSION WITH GOAL BLOOD PRESSURE LESS THAN 140/90: ICD-10-CM

## 2022-05-09 ENCOUNTER — OFFICE VISIT (OUTPATIENT)
Dept: ORTHOPEDICS | Facility: CLINIC | Age: 82
End: 2022-05-09
Payer: COMMERCIAL

## 2022-05-09 DIAGNOSIS — M19.011 OSTEOARTHRITIS OF GLENOHUMERAL JOINT, RIGHT: ICD-10-CM

## 2022-05-09 DIAGNOSIS — M25.511 CHRONIC RIGHT SHOULDER PAIN: ICD-10-CM

## 2022-05-09 DIAGNOSIS — M75.101 NONTRAUMATIC TEAR OF RIGHT ROTATOR CUFF, UNSPECIFIED TEAR EXTENT: ICD-10-CM

## 2022-05-09 DIAGNOSIS — G89.29 CHRONIC RIGHT SHOULDER PAIN: ICD-10-CM

## 2022-05-09 PROCEDURE — 20611 DRAIN/INJ JOINT/BURSA W/US: CPT | Mod: RT | Performed by: FAMILY MEDICINE

## 2022-05-09 RX ORDER — LOSARTAN POTASSIUM 100 MG/1
100 TABLET ORAL DAILY
Qty: 90 TABLET | Refills: 0 | Status: SHIPPED | OUTPATIENT
Start: 2022-05-09 | End: 2022-08-12

## 2022-05-09 RX ORDER — METHYLPREDNISOLONE ACETATE 40 MG/ML
40 INJECTION, SUSPENSION INTRA-ARTICULAR; INTRALESIONAL; INTRAMUSCULAR; SOFT TISSUE
Status: DISCONTINUED | OUTPATIENT
Start: 2022-05-09 | End: 2023-06-16

## 2022-05-09 RX ADMIN — METHYLPREDNISOLONE ACETATE 40 MG: 40 INJECTION, SUSPENSION INTRA-ARTICULAR; INTRALESIONAL; INTRAMUSCULAR; SOFT TISSUE at 11:09

## 2022-05-09 NOTE — TELEPHONE ENCOUNTER
losartan (COZAAR) 100 MG tablet  Last Written Prescription Date:  7/19/2021  Last Fill Quantity: 90,   # refills: 3  Last Office Visit :  7/19/2021  Future Office visit:  6/3/2022  Routing refill request to provider for review/approval because:  Last filled by [place] Provider.   Pt has visit with [facility] Provider in June.   Who will be taking care of medication management and follow up.    [place] Provider or [facility] Provider?          Darlene Draper RN  Central Triage Red Flags/Med Refills

## 2022-05-09 NOTE — LETTER
5/9/2022         RE: Janette Rahman  [address] [place] [address]        Dear Colleague,    Thank you for referring your patient, Janette Rahman, to the [facility] [place]. Please see a copy of my visit note below.    Janette is here for a right GH injection at the request of Dr. Schultz, she has right GH OA.            Large Joint Injection/Arthocentesis: R glenohumeral joint    Date/Time: 5/9/2022 11:09 AM  Performed by: Giuliano Segal DO  Authorized by: Giuliano Segal DO     Indications:  Pain  Needle Size:  25 G  Guidance: ultrasound    Location:  Shoulder      Site:  R glenohumeral joint  Medications:  40 mg methylPREDNISolone 40 MG/ML  Outcome:  Tolerated well, no immediate complications  Procedure discussed: discussed risks, benefits, and alternatives    Consent Given by:  Patient  Timeout: timeout called immediately prior to procedure    Prep: patient was prepped and draped in usual sterile fashion         Glenohumeral Injection - Ultrasound Guided    The patient was informed of the risks and the benefits of the procedure and a written consent was signed.  The patient s right shoulder was prepped with chlorhexidine in sterile fashion.   40 mg of kenalog suspension was drawn up into a 5 mL syringe with 3 mL of 1% lidocaine w/o Epi.  Injection was performed using sterile technique.  Under ultrasound guidance a 3.5-inch 22-gauge needle was used to enter the glenohumeral joint.  Posterior approach was used with the patient in lateral recumbent position, arm in neutral position at the side.  Needle placement was visualized and documented with ultrasound.  Ultrasound visualization was necessary due to the small joint space entered.  Injection performed long axis to the probe.  Injection solution visualized within the joint space.  Images were permanently stored for the patient's record.  There were no complications. The patient tolerated the  procedure well. There was negligible bleeding.                     Again, thank you for allowing me to participate in the care of your patient.        Sincerely,        Giuliano Segal, DO

## 2022-05-09 NOTE — PROGRESS NOTES
Janette is here for a right GH injection at the request of Dr. Schultz, she has right GH OA.            Large Joint Injection/Arthocentesis: R glenohumeral joint    Date/Time: 5/9/2022 11:09 AM  Performed by: Giuliano Segal DO  Authorized by: Giuliano Segal DO     Indications:  Pain  Needle Size:  25 G  Guidance: ultrasound    Location:  Shoulder      Site:  R glenohumeral joint  Medications:  40 mg methylPREDNISolone 40 MG/ML  Outcome:  Tolerated well, no immediate complications  Procedure discussed: discussed risks, benefits, and alternatives    Consent Given by:  Patient  Timeout: timeout called immediately prior to procedure    Prep: patient was prepped and draped in usual sterile fashion         Glenohumeral Injection - Ultrasound Guided    The patient was informed of the risks and the benefits of the procedure and a written consent was signed.  The patient's right shoulder was prepped with chlorhexidine in sterile fashion.   40 mg of methylprednisolone suspension was drawn up into a 5 mL syringe with 3 mL of 1% lidocaine w/o Epi.  Injection was performed using sterile technique.  Under ultrasound guidance a 3.5-inch 22-gauge needle was used to enter the glenohumeral joint.  Posterior approach was used with the patient in lateral recumbent position, arm in neutral position at the side.  Needle placement was visualized and documented with ultrasound.  Ultrasound visualization was necessary due to the small joint space entered.  Injection performed long axis to the probe.  Injection solution visualized within the joint space.  Images were permanently stored for the patient's record.  There were no complications. The patient tolerated the procedure well. There was negligible bleeding.

## 2022-05-09 NOTE — PATIENT INSTRUCTIONS
Thanks for coming today.  Ortho/[facility] Medicine Clinic  [address] [place] [address]    To schedule future appointments in Ortho Clinic, you may call [phone].    To schedule ordered imaging by your Provider: Call [facility] at [phone]    [employer] available online at:   [email or web address]    Please call if any further questions or concerns [phone] and ask for the Orthopedic Department. Clinic hours 8 am to 5 pm.    Return to clinic if symptoms worsen.

## 2022-06-01 ENCOUNTER — TRANSFERRED RECORDS (OUTPATIENT)
Dept: HEALTH INFORMATION MANAGEMENT | Facility: CLINIC | Age: 82
End: 2022-06-01
Payer: COMMERCIAL

## 2022-06-01 ENCOUNTER — NURSE TRIAGE (OUTPATIENT)
Dept: NURSING | Facility: CLINIC | Age: 82
End: 2022-06-01

## 2022-06-01 NOTE — TELEPHONE ENCOUNTER
Nurse Triage SBAR    Is this a 2nd Level Triage?  Yes    Situation:    Questions regarding appointment and B/P concerns.    Background/Assessment:     Pt has a visit on 6/3/2022 for a physical.      But 3 weeks ago.  Pt was diagnosed with Covid.  So Pt is not sure if she should come in for her visit on Friday.  Pt wants to make sure she is clear of the Covid.  Pt is feeling better and symptoms have subsided.   But does not want to jeopardize the safety of others.   Also, Pt was in the hospital for one night during her Covid symptoms. Pt collapsed on the floor due to increased weakness.   Pt was hospitalized for one night. Pt had low potassium and sodium.  The hospitalitis at that time.   Told the Pt to stop taking all of her blood pressure medications and follow up with her Provider.   Pt has been monitoring her blood pressure for the last 3 weeks since she has been off her blood pressure medications.     But yesterday 5/31/2022 and this morning her blood pressure has been running higher.    It was 171/89 on 5/31 and This morning it was 169/80.    No chest pain, chest pressure, headache, or shortness of breath noted.    No edema in the lower extremities.    So Pt took her blood pressure medications again last night and this morning.       So Pt is wondering if she needs to be on the same blood pressure medications, or continue taking the same medications.   But Pt mentioned when she was on Blood pressure medications.   She had severe swelling in her lower extremities.        So Pt is not sure what to do at this point.    Would like a call back from the clinic.  Please call [phone] for further assistance.         Darlene Draper RN  Central Triage Red Flags/Med Refills        Additional Information    Negative: Drug overdose and triager unable to answer question    Caller requesting information unrelated to medicine    Negative: Nursing judgment    Negative: Nursing judgment    Negative: Nursing judgment    Nursing  judgment    Protocols used: MEDICATION QUESTION CALL-A-OH, INFORMATION ONLY CALL - NO TRIAGE-A-OH

## 2022-06-03 ENCOUNTER — OFFICE VISIT (OUTPATIENT)
Dept: INTERNAL MEDICINE | Facility: CLINIC | Age: 82
End: 2022-06-03

## 2022-06-03 ENCOUNTER — MYC MEDICAL ADVICE (OUTPATIENT)
Dept: INTERNAL MEDICINE | Facility: CLINIC | Age: 82
End: 2022-06-03

## 2022-06-03 VITALS
BODY MASS INDEX: 25.22 KG/M2 | RESPIRATION RATE: 14 BRPM | DIASTOLIC BLOOD PRESSURE: 75 MMHG | SYSTOLIC BLOOD PRESSURE: 150 MMHG | TEMPERATURE: 97.8 F | OXYGEN SATURATION: 97 % | WEIGHT: 133.6 LBS | HEART RATE: 62 BPM | HEIGHT: 61 IN

## 2022-06-03 DIAGNOSIS — Z00.00 ENCOUNTER FOR MEDICARE ANNUAL WELLNESS EXAM: Primary | ICD-10-CM

## 2022-06-03 DIAGNOSIS — E78.5 HYPERLIPIDEMIA, UNSPECIFIED HYPERLIPIDEMIA TYPE: ICD-10-CM

## 2022-06-03 DIAGNOSIS — N95.2 VAGINAL ATROPHY: ICD-10-CM

## 2022-06-03 DIAGNOSIS — N94.10 DYSPAREUNIA, FEMALE: ICD-10-CM

## 2022-06-03 DIAGNOSIS — I10 ESSENTIAL HYPERTENSION, BENIGN: ICD-10-CM

## 2022-06-03 DIAGNOSIS — Z78.0 POST-MENOPAUSAL: ICD-10-CM

## 2022-06-03 PROCEDURE — G0439 PPPS, SUBSEQ VISIT: HCPCS | Performed by: INTERNAL MEDICINE

## 2022-06-03 RX ORDER — ESTRADIOL 0.1 MG/G
2 CREAM VAGINAL
Qty: 42.5 G | Refills: 3 | Status: SHIPPED | OUTPATIENT
Start: 2022-06-06 | End: 2023-07-14

## 2022-06-03 RX ORDER — SPIRONOLACTONE 25 MG/1
25 TABLET ORAL DAILY
Qty: 90 TABLET | Refills: 0 | Status: SHIPPED | OUTPATIENT
Start: 2022-06-03 | End: 2022-06-17

## 2022-06-03 RX ORDER — VIT C/B6/B5/MAGNESIUM/HERB 173 50-5-6-5MG
CAPSULE ORAL DAILY
COMMUNITY
End: 2023-06-16

## 2022-06-03 ASSESSMENT — PAIN SCALES - GENERAL: PAINLEVEL: NO PAIN (0)

## 2022-06-03 NOTE — PROGRESS NOTES
Medicare Annual Wellness Visit Previsit note    This 81 year old year old female presents for a  Medicare Wellness Exam.        What is your marital status:    Who lives in your household?     Does your home have loose rugs in the hallway?   No    Does your home have grab bars in the bathroom?   Yes    Does your home have handrails on the stairs?   Yes     Does your home have poorly lit areas?  No    How many times have you fallen in the past year? 1    How many times have you been in the Emergency Room in the last year? 1     How many times have you been hospitalized in the last year? 1    Do you feel threatened or controlled by a partner, ex-partner or anyone in your life? No    Has anyone hurt you physically, for example by pushing, hitting, slapping or kicking you   or forcing you to have sex? No    Are you sexually active? Yes      If yes, with men, women, or both? Male    If yes, do you have more than one current partner? No    If yes, are you using condoms? No     Have you had any sexually transmitted infections in the last year? No    Do you have any sexual concerns? Yes        FOR WOMEN ONLY:  1. What year did you stop having periods (approximate age)? 50    2.   Any vaginal bleeding in the last year? No    3.   Have you ever had an abnormal Pap smear? No        Do you need help with the phone, transportation, shopping, preparing meals, housework, laundry, medications or managing money? No     Have you noticed any hearing difficulties? No    Do you wear hearing aids? Yes     Have you seen a hearing professional such as an audiologist in the past year? Yes     Do you have vision difficulties? No    Do you wear glasses or contacts? Yes     Have you seen an eye doctor in the past year? Yes     How many servings of fruits and vegetables do you eat a day (1 serving is 1 cup)? Once a day    How often do you exercise in a week? 4x    What kind of exercise do you do and how long? Walking    Do you  wear a seatbelt when driving or riding in a vehicle? Yes    Do you use tobacco?  No    Do you use e-cigarettes?  No    Do you use any other drugs? No         Do you drink alcohol? Yes    If you drink alcohol, how many drinks per week? 4      PHQ-2: Over the past 2 weeks, how often have you been bothered by any of the following problems?  1) Little interest or pleasure in doing things: 0    2) Feeling down, depressed, or hopeless: 0    Total = 0    Have you completed an Advance Directives document? Yes     If yes, have you given a copy to the clinic? No    Would you like information on Advance Directives today? No      ANTONETTE Resendez at 8:52 AM on 6/3/2022.

## 2022-06-03 NOTE — PATIENT INSTRUCTIONS
Patient Education   Personalized Prevention Plan  You are due for the preventive services outlined below.  Your care team is available to assist you in scheduling these services.  If you have already completed any of these items, please share that information with your care team to update in your medical record.  Health Maintenance Due   Topic Date Due     COVID-19 Vaccine (1) Never done     Hepatitis C Screening  Never done     Zoster (Shingles) Vaccine (2 of 3) 12/31/2013     Osteoporosis Screening  06/14/2022         Assessment  1  Pancreatitis (577 0) (K85 90)   2  Controlled type 2 diabetes mellitus without complication (791 92) (Y34 3)   3  Mild intermittent asthma without complication (393 64) (M18 94)    Plan  Controlled type 2 diabetes mellitus without complication    · NovoLIN 70/30 ReliOn (70-30) 100 UNIT/ML Subcutaneous Suspension; INJECT  20 UNIT Twice daily   · ReliOn Insulin Syringe 31G X 5/16 0 5 ML Miscellaneous; Inject insulin BID   · (1) CBC/PLT/DIFF; Status:Active; Requested ROHITH:[id number];    · (1) COMPREHENSIVE METABOLIC PANEL; Status:Active; Requested ZHR:[id number];    · (1) HEMOGLOBIN A1C; Status:Active; Requested ZEO:[id number];    · (1) LIPID PANEL FASTING W DIRECT LDL REFLEX; Status:Active; Requested  YIJ:[id number];    · (1) MICROALBUMIN CREATININE RATIO, RANDOM URINE; Status:Active; Requested  for:26Oct2017;    · (1) TSH WITH FT4 REFLEX; Status:Active; Requested VEP:[id number];    · 2 - Mayank GARCIA, Jai DENISE [facility]  *  Status: Hold For -  Scheduling  Requested for: [id number]  Care Summary provided  : Yes  Pancreatitis    · (1) LIPASE; Status:Active; Requested TAWANA:[id number];     Discussion/Summary  Discussion Summary:   *DMadvised the metformin 1000mg BID will not help him  Adding insulin to regimen  We will switch the insulin once his insurance becomes active  Relion 70/30 BID, 20 units for now  Need to stop drinking as this is a source of carbs  Need to start a low intensity exercise program  RTC in 1 week  lipase  Abd is negative on palpation for pain in pancreas  back pain: will assess in next visits  : will order spirometry next visit  For now, stay away from irritants  We have agreed that give the cost, Ventolin will be prescribed when his insurance gets active  Counseling Documentation With Imm: The patient was counseled regarding instructions for management,-- risk factor reductions,-- risks and benefits of treatment options,-- importance of compliance with treatment     Patient Education: Educational resources provided:   Medication SE Review and Pt Understands Tx: Possible side effects of new medications were reviewed with the patient/guardian today  The treatment plan was reviewed with the patient/guardian  The patient/guardian understands and agrees with the treatment plan      Chief Complaint  Chief Complaint Free Text Note Form: Presents to the clinic to establish care      History of Present Illness  HPI: as abovereports he has been diagnosed with DM x 2 years but has not been taking his meds  was supposed to be on insulin and metformin  He started using the insulin but stopped  Started taking the metformin only last night, because his serum glucose was 599 and he was feeling weak while at work  While reviewed his record, it showed he had been to [facility] ED on Oct 25th 2017, and his serum glucose was 600 mg/dL  Denied C P , SOB, cramps, abd pain  Has had frequent fatigue  No change in vision  He is not sure what insulin he was supposed to be on but says it looks like it was Levemir (he knows how to use insulin and has testing supplies at home, but by his wife)  With no insurance at this time, he is unable to afford it  Advised to get relion 70/30 from [facility] and take as directed  He reports he drinks a lot, almost every day  He uses it to hide away from daily problems of life at his job and at home   with 5 kids  No other medical problems  PHQ-9 score is elevated  He is advised to consult with psych  Also reports he has been told in the past that he has problems with his pancreas  He is not sure what  Has a Hx of mild intermittent asthma, several years, not having frequent wheezes, maybe 2-3 times a year  No a cougher  Hospital Based Practices Required Assessment:   Pain Assessment   the patient states they have pain  The pain is located in the Head and Stomach  The pain radiates to the Weakness , Diziness   The patient describes the pain as throbbing  (on a scale of 0 to 10, the patient rates the pain at 7 )    Prefered Language is  [address]   Primary Language is  Upper sorbian  Education Completed: disease/condition,-- medications-- and-- treatment/procedure   Teaching Method: verbal   Person Taught: patient   Evaluation Of Learning: verbalized/demonstrated understanding      Review of Systems  Complete-Male:   Constitutional: No fever or chills, feels well, no tiredness, no recent weight gain or weight loss  Cardiovascular: No complaints of slow heart rate, no fast heart rate, no chest pain, no palpitations, no leg claudication, no lower extremity  Respiratory: No complaints of shortness of breath, no wheezing, no cough, no SOB on exertion, no orthopnea or PND  Musculoskeletal: No complaints of arthralgia, no myalgias, no joint swelling or stiffness, no limb pain or swelling  Psychiatric: depression, but-- as noted in HPI,-- not suicidal,-- no anxiety-- and-- no sleep disturbances  ROS Reviewed:   ROS reviewed  Past Medical History  Active Problems And Past Medical History Reviewed: The active problems and past medical history were reviewed and updated today  Surgical History  1  History Of Prior Surgery  Surgical History Reviewed: The surgical history was reviewed and updated today  Family History  Mother    1  Family history of diabetes mellitus (V18 0) (Z83 3)   2  Family history of hypertension (V17 49) (Z82 49)  Father    3  Family history of bipolar disorder (V17 0) (Z81 8)   4  Family history of epilepsy (V17 2) (Z82 0)  Family History    5  Denied: Family history of drug addiction  Family History Reviewed: The family history was reviewed and updated today  Social History   · Alcohol use (V49 89) (Z78 9)   · Currently working   · Five children   · 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)   ·   Social History Reviewed: The social history was reviewed and updated today  The social history was reviewed and is unchanged  Current Meds   1  MetFORMIN HCl - 1000 MG Oral Tablet; Take 1 tablet twice daily; Therapy: 10SKM5305 to (Evaluate:24Apr2018) Recorded    Allergies  1  No Known Drug Allergies  2  Seafood   3  Seasonal    Vitals  Signs   Recorded: 01QZD5130 02:52PM   Temperature: 98 9 F  Heart Rate: 84  Systolic: 320  Diastolic: 80  Height: 5 ft 11 in  Weight: 264 lb 15 86 oz  BMI Calculated: 36 96  BSA Calculated: 2 38    Physical Exam    Constitutional   General appearance: Abnormal   well developed,-- normal body odor,-- does not smell of feces-- and-- does not smell of urine  Eyes   Conjunctiva and lids: No erythema, swelling or discharge  Pupils and irises: Equal, round, reactive to light  Pulmonary   Respiratory effort: No increased work of breathing or signs of respiratory distress  Auscultation of lungs: Clear to auscultation  Cardiovascular   Auscultation of heart: Normal rate and rhythm, normal S1 and S2, no murmurs  Pedal pulses: 2+ bilaterally  Abdomen   Abdomen: Abnormal  -- obese abd, soft, NTTP, no mass effect  Liver and spleen: No hepatomegaly or splenomegaly  Psychiatric   Judgment and insight: Normal     Orientation to person, place and time: Normal     Recent and remote memory: Intact  Mood and affect: Normal        Results/Data  PHQ-9 Adult Depression Screening 26Oct2017 02:58PM User, s     Test Name Result Flag Reference   PHQ-9 Adult Depression Score 11     Over the last two weeks, how often have you been bothered by any of the following problems?   Little interest or pleasure in doing things: More than half the days - 2  Feeling down, depressed, or hopeless: More than half the days - 2  Trouble falling or staying asleep, or sleeping too much: More than half the days - 2  Feeling tired or having little energy: Several days - 1  Poor appetite or over eating: More than half the days - 2  Feeling bad about yourself - or that you are a failure or have let yourself or your family down: More than half the days - 2  Trouble concentrating on things, such as reading the newspaper or watching television: Not at all - 0  Moving or speaking so slowly that other people could have noticed  Or the opposite -  being so fidgety or restless that you have been moving around a lot more than usual: Not at all - 0  Thoughts that you would be better off dead, or of hurting yourself in some way: Not at all - 0   PHQ-9 Adult Depression Screening Positive     PHQ-9 Difficulty Level Somewhat difficult     PHQ-9 Severity Moderate Depression         Attending Note  Collaborating Physician Note: Collaborating Physician: I agree with the Advanced Practitioner note  Agree with Advanced Practitioner Note Except: discussed the case today with provider  Patient is being referred to the financial counselor  Recommended a referral to  to provide assistance for mental health issues/alcohol use  Per provider, patient is aware of the diabetic diet and how to administer insulin  As he is asymptomatic regarding abdominal pain, not sure checking lipase is indicated  Will be following up at [facility] in one week        Signatures   Electronically signed by : Hermelinda Herndon; Oct 26 2017  4:25PM EST                       (Author)    Electronically signed by : Hermelinda Herndon; Oct 27 2017  9:01AM EST                       (Author)    Electronically signed by : Luciano Brittle, M D ; Oct 27 2017  9:17AM EST                       (Review)

## 2022-06-03 NOTE — NURSING NOTE
Chief Complaint   Patient presents with     Wellness Visit     Patient comes in for a wellness exam.         Marciano Galicia MA on 6/3/2022 at 7:14 AM

## 2022-06-03 NOTE — PROGRESS NOTES
Janette Rahman is a 81 year old year old female who presents for Annual Wellness Visit.    Are you in the first 12 months of your Medicare coverage?  No    Recently recovered from COVID.  She was very hypovolemic.  BP meds were stopped  Restarted meds 3 days ago  At home, BP has been 148/78.  No lightheadedness    In [place] over the winter she was having low BPs.  She would note blurry vision.  Typically in the afternoon    Takes hydrochlorothiazide in the am and lisinopril/amlodipine in the afternoon    Healthy Habits:  See MA note    1) Repeat 3 items (Leader, Season, Table)    2) Clock draw: NORMAL  3) 3 item recall: Recalls 3 objects  Results: 3 items recalled: COGNITIVE IMPAIRMENT LESS LIKELY    Mini-CogTM Copyright S Tom. Licensed by the author for use in [facility] [employer]; reprinted with permission (aide@[facility]). All rights reserved.      The patient does not drink >3 drinks per day nor >7 drinks per week.      Do you have a Health Care Directive?: No, advance care planning information given to patient to review.  Advanced care planning was discussed at today's visit.    Current providers sharing in care for this patient include:   Patient Care Team: see care team in [facility]      Hearing impairment: Yes, uses hearing aid    Ability to successfully perform activities of daily living: Yes, no assistance needed     Fall risk:   Fallen 2 or more times in the past year?: No  Any fall with injury in the past year?: No    Home safety:  none identified    All Histories, problem list, medication list, allergies and medical/social/surgical histories reviewed and updated in [facility] as appropriate.  HM was reviewed and updated     ROS:  Constitutional: no fevers, chills, night sweats or unintentional weight change   Eyes: no vision change, diplopia or red eyes   Ears, Nose, Mouth, Throat: no tinnitus or hearing change, no epistaxis or nasal discharge, no oral lesions, throat clear   Cardiovascular: no chest  "pain, palpitations, or pain with walking, no orthopnea or PND   Respiratory: no dyspnea, cough, shortness of breath or wheezing   GI: no nausea, vomiting, diarrhea or constipation, no abdominal pain   : no change in urine, no dysuria or hematuria, no sexual dysfunction   Musculoskeletal: no joint or muscle pain or swelling   Integumentary: no concerning lesions or moles   Neuro: no loss of strength or sensation, no numbness or tingling, no tremor, no dizziness, no headache   Endo: no polyuria or polydipsia, no temperature intolerance   Heme/Lymph: no concerning bumps, no bleeding problems   Allergy: no environmental allergies   Psych: no depression or anxiety, no sleep problems    OBJECTIVE:                                                            BP (!) 150/75   Pulse 62   Temp 97.8  F (36.6  C) (Oral)   Resp 14   Ht 1.543 m (5' 0.75\")   Wt 60.6 kg (133 lb 9.6 oz)   LMP  (LMP Unknown)   SpO2 97%   BMI 25.45 kg/m   Body mass index is 25.45 kg/m .  General:  Pleasant, alert, no acute distress  Eyes:  Pupils 2-3 mm, sclera white, EOM's full.    Ears:  TM's normal, EAC's patent, clear of cerumen  Throat/Mouth:  Wearing mask  Neck:  Full AROM, supple, thyroid smooth, symmetric, not enlarge, no nodules  Lungs:  Clear to auscultation throughout, no wheezes, rhonchi or rales.  C/V:  Regular rate and rhythm, no murmurs, rubs or gallops.  No JVD, no carotid bruits.  No peripheral edema.    Abdomen:  Not distended.  Bowel sounds active.  No tenderness, no hepatosplenomegaly or masses.  No CVA tenderness or masses.  Lymph:  No cervical, lymph nodes.  Neuro:   Face symmetric. No tremor.  Gait steady.   M/S:  No joint deformities noted.  No joint swelling.  Skin:  No suspicious lesions.  No rashes or jaundice.  Normal moisture, good skin turgor.   Psych:  Broad range affect.  Not psychomotor slowed.  No signs of anxiety or agitation.    ASSESSMENT / PLAN:                                                          "     COUNSELING:  Reviewed preventive health counseling, as reflected in patient instructions       Advanced Planning   (recent marriage)  Declines shingles and COVID vaccination  Continue yearly mammograms as she is overall in excellent health    Essential hypertension, benign  Above gaol today, although previously was having low BPs at home.  She also noted bothersome LE edema with the amlodipine  Discontinue amlodipine  Start low dose spironolactone and titrate as needed.  She historically has had hypokalemia on her labs.  - she will send home BP readings in 1-2 weeks  - spironolactone (ALDACTONE) 25 MG tablet  Dispense: 90 tablet; Refill: 0  - Basic metabolic panel (stopped K replacement)    Dyspareunia, female  Vaginal atrophy  Continue with lubricants  Can also start topical estrogen  - estradiol (ESTRACE) 0.1 MG/GM vaginal cream  Dispense: 42.5 g; Refill: 3    Hyperlipidemia, unspecified hyperlipidemia type  - Lipid panel reflex to direct LDL Fasting    Reviewed patients plan of care and provided an AVS. This Care Plan has been established and reviewed with the Patient.    Alyssa Lopez MD   [facility]

## 2022-06-09 ENCOUNTER — OFFICE VISIT (OUTPATIENT)
Dept: ORTHOPEDICS | Facility: CLINIC | Age: 82
End: 2022-06-09
Payer: COMMERCIAL

## 2022-06-09 ENCOUNTER — LAB (OUTPATIENT)
Dept: LAB | Facility: OTHER | Age: 82
End: 2022-06-09

## 2022-06-09 VITALS
DIASTOLIC BLOOD PRESSURE: 70 MMHG | OXYGEN SATURATION: 97 % | BODY MASS INDEX: 24.92 KG/M2 | WEIGHT: 132 LBS | HEART RATE: 69 BPM | SYSTOLIC BLOOD PRESSURE: 134 MMHG | HEIGHT: 61 IN

## 2022-06-09 DIAGNOSIS — M17.11 PRIMARY OSTEOARTHRITIS OF RIGHT KNEE: Primary | ICD-10-CM

## 2022-06-09 DIAGNOSIS — I10 ESSENTIAL HYPERTENSION, BENIGN: ICD-10-CM

## 2022-06-09 DIAGNOSIS — S83.241A OTHER TEAR OF MEDIAL MENISCUS OF RIGHT KNEE, UNSPECIFIED WHETHER OLD OR CURRENT TEAR, INITIAL ENCOUNTER: ICD-10-CM

## 2022-06-09 DIAGNOSIS — M67.449 GANGLION CYST OF FINGER: ICD-10-CM

## 2022-06-09 DIAGNOSIS — S83.281A OTHER TEAR OF LATERAL MENISCUS OF RIGHT KNEE, UNSPECIFIED WHETHER OLD OR CURRENT TEAR, INITIAL ENCOUNTER: ICD-10-CM

## 2022-06-09 DIAGNOSIS — E78.5 HYPERLIPIDEMIA, UNSPECIFIED HYPERLIPIDEMIA TYPE: ICD-10-CM

## 2022-06-09 DIAGNOSIS — Z11.59 NEED FOR HEPATITIS C SCREENING TEST: Primary | ICD-10-CM

## 2022-06-09 LAB
ANION GAP SERPL CALCULATED.3IONS-SCNC: 7 MMOL/L (ref 3–14)
BUN SERPL-MCNC: 11 MG/DL (ref 7–30)
CALCIUM SERPL-MCNC: 9 MG/DL (ref 8.5–10.1)
CHLORIDE BLD-SCNC: 98 MMOL/L (ref 94–109)
CHOLEST SERPL-MCNC: 195 MG/DL
CO2 SERPL-SCNC: 27 MMOL/L (ref 20–32)
CREAT SERPL-MCNC: 0.65 MG/DL (ref 0.52–1.04)
FASTING STATUS PATIENT QL REPORTED: YES
GFR SERPL CREATININE-BSD FRML MDRD: 89 ML/MIN/1.73M2
GLUCOSE BLD-MCNC: 101 MG/DL (ref 70–99)
HDLC SERPL-MCNC: 61 MG/DL
LDLC SERPL CALC-MCNC: 108 MG/DL
NONHDLC SERPL-MCNC: 134 MG/DL
POTASSIUM BLD-SCNC: 3.8 MMOL/L (ref 3.4–5.3)
SODIUM SERPL-SCNC: 132 MMOL/L (ref 133–144)
TRIGL SERPL-MCNC: 129 MG/DL

## 2022-06-09 PROCEDURE — 80061 LIPID PANEL: CPT

## 2022-06-09 PROCEDURE — 20610 DRAIN/INJ JOINT/BURSA W/O US: CPT | Mod: RT | Performed by: ORTHOPAEDIC SURGERY

## 2022-06-09 PROCEDURE — 99213 OFFICE O/P EST LOW 20 MIN: CPT | Mod: 25 | Performed by: ORTHOPAEDIC SURGERY

## 2022-06-09 PROCEDURE — 36415 COLL VENOUS BLD VENIPUNCTURE: CPT

## 2022-06-09 PROCEDURE — 80048 BASIC METABOLIC PNL TOTAL CA: CPT

## 2022-06-09 RX ORDER — LIDOCAINE HYDROCHLORIDE 10 MG/ML
4 INJECTION, SOLUTION EPIDURAL; INFILTRATION; INTRACAUDAL; PERINEURAL
Status: DISCONTINUED | OUTPATIENT
Start: 2022-06-09 | End: 2023-06-16

## 2022-06-09 RX ORDER — LIDOCAINE HYDROCHLORIDE 10 MG/ML
1 INJECTION, SOLUTION INFILTRATION; PERINEURAL
Status: DISCONTINUED | OUTPATIENT
Start: 2022-06-09 | End: 2024-06-28

## 2022-06-09 RX ORDER — METHYLPREDNISOLONE ACETATE 80 MG/ML
80 INJECTION, SUSPENSION INTRA-ARTICULAR; INTRALESIONAL; INTRAMUSCULAR; SOFT TISSUE
Status: DISCONTINUED | OUTPATIENT
Start: 2022-06-09 | End: 2023-06-16

## 2022-06-09 RX ADMIN — LIDOCAINE HYDROCHLORIDE 4 ML: 10 INJECTION, SOLUTION EPIDURAL; INFILTRATION; INTRACAUDAL; PERINEURAL at 15:35

## 2022-06-09 RX ADMIN — METHYLPREDNISOLONE ACETATE 80 MG: 80 INJECTION, SUSPENSION INTRA-ARTICULAR; INTRALESIONAL; INTRAMUSCULAR; SOFT TISSUE at 15:35

## 2022-06-09 RX ADMIN — LIDOCAINE HYDROCHLORIDE 1 ML: 10 INJECTION, SOLUTION INFILTRATION; PERINEURAL at 15:33

## 2022-06-09 ASSESSMENT — PAIN SCALES - GENERAL: PAINLEVEL: SEVERE PAIN (6)

## 2022-06-09 NOTE — PROGRESS NOTES
Hand / Upper Extremity Injection/Arthrocentesis    Date/Time: 6/9/2022 3:33 PM  Performed by: David Schultz MD  Authorized by: David Schultz MD     Indications:  Therapeutic and pain  Needle Size:  18 G  Guidance: landmark    Approach:  Lateral    Medications:  1 mL lidocaine 1 %  Procedure discussed: discussed risks, benefits, and alternatives    Consent Given by:  Patient  Timeout: timeout called immediately prior to procedure    Prep: patient was prepped and draped in usual sterile fashion    Large Joint Injection/Arthocentesis: R knee joint    Date/Time: 6/9/2022 3:35 PM  Performed by: David Schultz MD  Authorized by: David Schultz MD     Indications:  Pain and osteoarthritis  Needle Size:  22 G  Guidance: landmark guided    Approach:  Anterolateral  Location:  Knee      Medications:  80 mg methylPREDNISolone 80 MG/ML; 4 mL lidocaine (PF) 1 %  Outcome:  Tolerated well, no immediate complications  Procedure discussed: discussed risks, benefits, and alternatives    Consent Given by:  Patient  Timeout: timeout called immediately prior to procedure    Prep: patient was prepped and draped in usual sterile fashion

## 2022-06-09 NOTE — LETTER
6/9/2022         RE: Janette Rahman  [address] [place] [address]        Dear Colleague,    Thank you for referring your patient, Janette Rahman, to the [facility]. Please see a copy of my visit note below.    HISTORY OF PRESENT ILLNESS    Janette Rahman is a 80 year old female who is seen in follow-up for evaluation of  right knee   Osteoarthritis and medial meniscus and lateral meniscus tears based on a 12/7/20 MRI.    Treatments tried to this point:   Because of the lack of mechanical symptoms, Corticosteroid injection 12/10/20.  Length of effectiveness: 5-6 months  Corticosteroid injection 6/2/21: close to one years    Present symptoms:   Pain has returned  +catching/popping, +locking, +feeling of giving-way, the injections help these symptoms       Also complains of a finger cyst of the left 3rd finger.  Causes stiffness  Pain if bumped.   Makes finger looked deformed    She says her right shoulder is doing well after the corticosteroid injection     Other PMH:  has a past medical history of HLD (hyperlipidaemia), HTN (hypertension), and Osteoporosis.    She has no past medical history of Breast cancer (H) or Malignant neoplasm of ovary (H).    Surgical:  has a past surgical history that includes shoulder surgery; rotator cuff repair rt/lt (2/2006); Bunionectomy; carpal tunnel release rt/lt; Adenoidectomy; Tonsillectomy; and meniscal tear knee (Right, 12/2020).    Family Hx:  family history includes Cerebrovascular Disease in her mother; Coronary Artery Disease in her mother; Heart Disease in her father; Hypertension in her mother.    Social Hx:  reports that she has never smoked. She has never used smokeless tobacco. She reports previous alcohol use of about 1.7 - 2.5 standard drinks of alcohol per week. She reports that she does not use drugs.    REVIEW OF SYSTEMS:    CONSTITUTIONAL:  NEGATIVE for fever, chills, change in  "weight  INTEGUMENTARY/SKIN:  NEGATIVE for worrisome rashes, moles or lesions  EYES:  NEGATIVE for vision changes or irritation  ENT/MOUTH:  NEGATIVE for ear, mouth and throat problems  RESP:  NEGATIVE for significant cough or SOB  BREAST:  NEGATIVE for masses, tenderness or discharge  CV:  NEGATIVE for chest pain, palpitations or peripheral edema  GI:  NEGATIVE for nausea, abdominal pain, heartburn, or change in bowel habits  :  Negative   MUSCULOSKELETAL:  See HPI above  NEURO:  NEGATIVE for weakness, dizziness or paresthesias  ENDOCRINE:  NEGATIVE for temperature intolerance, skin/hair changes  HEME/ALLERGY/IMMUNE:  NEGATIVE for bleeding problems  PSYCHIATRIC:  NEGATIVE for changes in mood or affect    PHYSICAL EXAM:  /70 (BP Location: Right arm, Patient Position: Sitting, Cuff Size: Adult Regular)   Pulse 69   Ht 1.543 m (5' 0.75\")   Wt 59.9 kg (132 lb)   LMP  (LMP Unknown)   SpO2 97%   BMI 25.15 kg/m     GENERAL APPEARANCE: healthy, alert and no distress   SKIN: no suspicious lesions or rashes  NEURO: Normal strength and tone, mentation intact and speech normal  VASCULAR:  good pulses, and cappillary refill   LYMPH: no lymphadenopathy   PSYCH:  mentation appears normal and affect normal/bright  RESP: no increased work of breathing     KNEE EXAM:    Gait: walks with antalgic gait favoring right side  Alignment: normal   Effusion: mild  ROM: 0-110  Medial joint line tenderness, lateral joint line tenderness     Left 3rd finger:  There is a soft, mobile mass over the dorsum of the PIP joint.  The fluid seems to track down the ulnar-dorsal side to midway down the proximal phalanx.  She has some osteoarthritis changes in the fingers of this hand.  Tender over the mass.  Mild limitations of PIP flexion.    X-RAY:  Obtained 12/7/20,    No acute bony or soft tissue abnormality. No significant  medial or lateral joint space narrowing but there is mild lateral  compartment marginal bony spurring. Moderate " medial patellofemoral  joint narrowing with minimal bony spurring. Possible mild  chondrocalcinosis. No joint space effusion.       MRI 12/7/20 right knee  1 Horizontal tearing of the body and posterior horn of the medial  meniscus. Horizontal tearing of the body and posterior horn of the  lateral meniscus.  2. 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 Partial-thickness fissuring of cartilage in the medial  compartment.  3. Moderate joint effusion       Impression:   Osteoarthritis right knee  Medial meniscus tear and lateral meniscus tear right knee, Mechanical symptoms   Left 3rd finger ganglion cyst.    Plan:  Even though she has  mechanical symptoms, she doesn't have them when the injection is working, and doesn't want arthroscopy at this time.    Injection therapy: Discussed findings and diagnosis with patient.  We talked about treatment options.  We decided that repeat corticosteroid injection would be the best option.  Thus, With the patient's consent, right knee(s) injected intra-articularly with 80mg of Depomedrol and 4cc of local anesthetic after sterile prep.      Procedure Note:  The risks, benefits and potential complications of aspiration were discussed with the patient (including but not limited to, bleeding, infection, pain, scar, damage to adjacent structures, failure to relieve symptoms) . Questions were addressed and answered.The patient elected to proceed. Written, informed consent was obtained. The correct procedural site was identified and confirmed. A Left 3rd finger  cyst aspiration was performed using 1mL of local anesthetic after sterile prep to the correct procedural site.  Approximately 1 cc of bloody-gelatinous fluid resulted/extruded from the needle insertion site.  I impaled the mass a few times, no fluid came up into the syringe.  I manipulated the area manually to try to extrude more fluid.   The mass was smaller, but certainly not gone. Sterile bandaid  applied. This was tolerated well by the patient. No apparent complications.     Consider surgical excision in the future    Return to clinic as needed.      GIANNA Schultz MD  Dept. Orthopedic Surgery  [facility]     Hand / Upper Extremity Injection/Arthrocentesis    Date/Time: 6/9/2022 3:33 PM  Performed by: David Schultz MD  Authorized by: David Schultz MD     Indications:  Therapeutic and pain  Needle Size:  18 G  Guidance: landmark    Approach:  Lateral    Medications:  1 mL lidocaine 1 %  Procedure discussed: discussed risks, benefits, and alternatives    Consent Given by:  Patient  Timeout: timeout called immediately prior to procedure    Prep: patient was prepped and draped in usual sterile fashion    Large Joint Injection/Arthocentesis: R knee joint    Date/Time: 6/9/2022 3:35 PM  Performed by: David Schultz MD  Authorized by: David Schultz MD     Indications:  Pain and osteoarthritis  Needle Size:  22 G  Guidance: landmark guided    Approach:  Anterolateral  Location:  Knee      Medications:  80 mg methylPREDNISolone 80 MG/ML; 4 mL lidocaine (PF) 1 %  Outcome:  Tolerated well, no immediate complications  Procedure discussed: discussed risks, benefits, and alternatives    Consent Given by:  Patient  Timeout: timeout called immediately prior to procedure    Prep: patient was prepped and draped in usual sterile fashion                  Again, thank you for allowing me to participate in the care of your patient.        Sincerely,        David Schultz MD

## 2022-06-09 NOTE — PROGRESS NOTES
HISTORY OF PRESENT ILLNESS    Janette Rahman is a 80 year old female who is seen in follow-up for evaluation of  right knee   Osteoarthritis and medial meniscus and lateral meniscus tears based on a 12/7/20 MRI.    Treatments tried to this point:   Because of the lack of mechanical symptoms, Corticosteroid injection 12/10/20.  Length of effectiveness: 5-6 months  Corticosteroid injection 6/2/21: close to one years    Present symptoms:   Pain has returned  +catching/popping, +locking, +feeling of giving-way, the injections help these symptoms       Also complains of a finger cyst of the left 3rd finger.  Causes stiffness  Pain if bumped.   Makes finger looked deformed    She says her right shoulder is doing well after the corticosteroid injection     Other PMH:  has a past medical history of HLD (hyperlipidaemia), HTN (hypertension), and Osteoporosis.    She has no past medical history of Breast cancer (H) or Malignant neoplasm of ovary (H).    Surgical:  has a past surgical history that includes shoulder surgery; rotator cuff repair rt/lt (2/2006); Bunionectomy; carpal tunnel release rt/lt; Adenoidectomy; Tonsillectomy; and meniscal tear knee (Right, 12/2020).    Family Hx:  family history includes Cerebrovascular Disease in her mother; Coronary Artery Disease in her mother; Heart Disease in her father; Hypertension in her mother.    Social Hx:  reports that she has never smoked. She has never used smokeless tobacco. She reports previous alcohol use of about 1.7 - 2.5 standard drinks of alcohol per week. She reports that she does not use drugs.    REVIEW OF SYSTEMS:    CONSTITUTIONAL:  NEGATIVE for fever, chills, change in weight  INTEGUMENTARY/SKIN:  NEGATIVE for worrisome rashes, moles or lesions  EYES:  NEGATIVE for vision changes or irritation  ENT/MOUTH:  NEGATIVE for ear, mouth and throat problems  RESP:  NEGATIVE for significant cough or SOB  BREAST:  NEGATIVE for masses, tenderness or discharge  CV:   "NEGATIVE for chest pain, palpitations or peripheral edema  GI:  NEGATIVE for nausea, abdominal pain, heartburn, or change in bowel habits  :  Negative   MUSCULOSKELETAL:  See HPI above  NEURO:  NEGATIVE for weakness, dizziness or paresthesias  ENDOCRINE:  NEGATIVE for temperature intolerance, skin/hair changes  HEME/ALLERGY/IMMUNE:  NEGATIVE for bleeding problems  PSYCHIATRIC:  NEGATIVE for changes in mood or affect    PHYSICAL EXAM:  /70 (BP Location: Right arm, Patient Position: Sitting, Cuff Size: Adult Regular)   Pulse 69   Ht 1.543 m (5' 0.75\")   Wt 59.9 kg (132 lb)   LMP  (LMP Unknown)   SpO2 97%   BMI 25.15 kg/m     GENERAL APPEARANCE: healthy, alert and no distress   SKIN: no suspicious lesions or rashes  NEURO: Normal strength and tone, mentation intact and speech normal  VASCULAR:  good pulses, and cappillary refill   LYMPH: no lymphadenopathy   PSYCH:  mentation appears normal and affect normal/bright  RESP: no increased work of breathing     KNEE EXAM:    Gait: walks with antalgic gait favoring right side  Alignment: normal   Effusion: mild  ROM: 0-110  Medial joint line tenderness, lateral joint line tenderness     Left 3rd finger:  There is a soft, mobile mass over the dorsum of the PIP joint.  The fluid seems to track down the ulnar-dorsal side to midway down the proximal phalanx.  She has some osteoarthritis changes in the fingers of this hand.  Tender over the mass.  Mild limitations of PIP flexion.    X-RAY:  Obtained 12/7/20,    No acute bony or soft tissue abnormality. No significant  medial or lateral joint space narrowing but there is mild lateral  compartment marginal bony spurring. Moderate medial patellofemoral  joint narrowing with minimal bony spurring. Possible mild  chondrocalcinosis. No joint space effusion.       MRI 12/7/20 right knee  1 Horizontal tearing of the body and posterior horn of the medial  meniscus. Horizontal tearing of the body and posterior horn of " the  lateral meniscus.  2. 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 Partial-thickness fissuring of cartilage in the medial  compartment.  3. Moderate joint effusion       Impression:   Osteoarthritis right knee  Medial meniscus tear and lateral meniscus tear right knee, Mechanical symptoms   Left 3rd finger ganglion cyst.    Plan:  Even though she has  mechanical symptoms, she doesn't have them when the injection is working, and doesn't want arthroscopy at this time.    Injection therapy: Discussed findings and diagnosis with patient.  We talked about treatment options.  We decided that repeat corticosteroid injection would be the best option.  Thus, With the patient's consent, right knee(s) injected intra-articularly with 80mg of Depomedrol and 4cc of local anesthetic after sterile prep.      Procedure Note:  The risks, benefits and potential complications of aspiration were discussed with the patient (including but not limited to, bleeding, infection, pain, scar, damage to adjacent structures, failure to relieve symptoms) . Questions were addressed and answered.The patient elected to proceed. Written, informed consent was obtained. The correct procedural site was identified and confirmed. A Left 3rd finger  cyst aspiration was performed using 1mL of local anesthetic after sterile prep to the correct procedural site.  Approximately 1 cc of bloody-gelatinous fluid resulted/extruded from the needle insertion site.  I impaled the mass a few times, no fluid came up into the syringe.  I manipulated the area manually to try to extrude more fluid.   The mass was smaller, but certainly not gone. Sterile bandaid applied. This was tolerated well by the patient. No apparent complications.     Consider surgical excision in the future    Return to clinic as needed.      GIANNA Schultz MD  Dept. Orthopedic Surgery  [facility]

## 2022-06-14 ENCOUNTER — MYC MEDICAL ADVICE (OUTPATIENT)
Dept: INTERNAL MEDICINE | Facility: CLINIC | Age: 82
End: 2022-06-14
Payer: COMMERCIAL

## 2022-06-15 ENCOUNTER — NURSE TRIAGE (OUTPATIENT)
Dept: NURSING | Facility: CLINIC | Age: 82
End: 2022-06-15
Payer: COMMERCIAL

## 2022-06-15 NOTE — TELEPHONE ENCOUNTER
"Call from Janette, see triage note.  Is asking to be seen, no appointments with primary care until 6/23, scheduled with NP clinic for Friday.  Knows if symptoms worsen/change will go to urgent care prior to  Friday  Nurse Triage SBAR    Is this a 2nd Level Triage? YES, LICENSED PRACTITIONER REVIEW IS REQUIRED    Situation:   Blood pressure fluctuating since changed from amlodipine to spironolactone. BP's range 148/72-94/65, pulse 56-79   Can hear her heart swishing in her ears    Background:   seen 6/6/22, change of medication from amlodipine due to swelling, started on spironolactone    Assessment:   follow up per primary care    Protocol Recommended Disposition:   Discuss With PCP And Call Back By Nurse Today        Reason for Disposition    Taking BP medications and feels is having side effects (e.g., impotence, cough, dizziness)    Additional Information    Negative: Sounds like a life-threatening emergency to the triager    Negative: Pregnant > 20 weeks or postpartum (< 6 weeks after delivery) and new hand or face swelling    Negative: Pregnant > 20 weeks and BP > 140/90    Negative: Systolic BP >= 160 OR Diastolic >= 100, and any cardiac or neurologic symptoms (e.g., chest pain, difficulty breathing, unsteady gait, blurred vision)    Negative: Patient sounds very sick or weak to the triager    Negative: BP Systolic BP >= 140 OR Diastolic >= 90 and postpartum (from 0 to 6 weeks after delivery)    Negative: Systolic BP >= 180 OR Diastolic >= 110, and missed most recent dose of blood pressure medication    Negative: Systolic BP >= 180 OR Diastolic >= 110    Negative: Patient wants to be seen    Negative: Ran out of BP medications    Answer Assessment - Initial Assessment Questions  1. BLOOD PRESSURE: \"What is the blood pressure?\" \"Did you take at least two measurements 5 minutes apart?\"      Yesterday afternoon 94/65, yesterday morning 148/72 pulse 56  2. ONSET: \"When did you take your blood pressure?\"      " "yesterday  3. HOW: \"How did you obtain the blood pressure?\" (e.g., visiting nurse, automatic home BP monitor)      Home BP monitor  4. HISTORY: \"Do you have a history of high blood pressure?\"      yes  5. MEDICATIONS: \"Are you taking any medications for blood pressure?\" \"Have you missed any doses recently?\"      Is taking spironolactone which is a change from amlodipine on 6/6/22  6. OTHER SYMPTOMS: \"Do you have any symptoms?\" (e.g., headache, chest pain, blurred vision, difficulty breathing, weakness)      Reports hearing her heart swishing when sits or lies down.  Yesterday when BP 94/65 she felt weak.  Today no weakness  7. PREGNANCY: \"Is there any chance you are pregnant?\" \"When was your last menstrual period?\"      N/A    Protocols used: HIGH BLOOD PRESSURE-A-OH      "

## 2022-06-17 ENCOUNTER — OFFICE VISIT (OUTPATIENT)
Dept: FAMILY MEDICINE | Facility: CLINIC | Age: 82
End: 2022-06-17
Payer: COMMERCIAL

## 2022-06-17 ENCOUNTER — LAB (OUTPATIENT)
Dept: LAB | Facility: CLINIC | Age: 82
End: 2022-06-17

## 2022-06-17 VITALS
RESPIRATION RATE: 16 BRPM | BODY MASS INDEX: 26.45 KG/M2 | HEIGHT: 60 IN | OXYGEN SATURATION: 100 % | DIASTOLIC BLOOD PRESSURE: 75 MMHG | SYSTOLIC BLOOD PRESSURE: 160 MMHG | HEART RATE: 50 BPM | WEIGHT: 134.7 LBS

## 2022-06-17 DIAGNOSIS — I10 ESSENTIAL HYPERTENSION, BENIGN: ICD-10-CM

## 2022-06-17 DIAGNOSIS — I10 ESSENTIAL HYPERTENSION, BENIGN: Primary | ICD-10-CM

## 2022-06-17 DIAGNOSIS — Z86.79 HX OF CAROTID ARTERY STENOSIS: ICD-10-CM

## 2022-06-17 DIAGNOSIS — R42 DIZZINESS: ICD-10-CM

## 2022-06-17 DIAGNOSIS — Z11.59 NEED FOR HEPATITIS C SCREENING TEST: Primary | ICD-10-CM

## 2022-06-17 LAB
ANION GAP SERPL CALCULATED.3IONS-SCNC: 8 MMOL/L (ref 3–14)
BUN SERPL-MCNC: 10 MG/DL (ref 7–30)
CALCIUM SERPL-MCNC: 9.4 MG/DL (ref 8.5–10.1)
CHLORIDE BLD-SCNC: 92 MMOL/L (ref 94–109)
CO2 SERPL-SCNC: 27 MMOL/L (ref 20–32)
CREAT SERPL-MCNC: 0.6 MG/DL (ref 0.52–1.04)
GFR SERPL CREATININE-BSD FRML MDRD: >90 ML/MIN/1.73M2
GLUCOSE BLD-MCNC: 93 MG/DL (ref 70–99)
HCV AB SERPL QL IA: NONREACTIVE
MAGNESIUM SERPL-MCNC: 2.2 MG/DL (ref 1.6–2.3)
PHOSPHATE SERPL-MCNC: 3.3 MG/DL (ref 2.5–4.5)
POTASSIUM BLD-SCNC: 3.6 MMOL/L (ref 3.4–5.3)
SODIUM SERPL-SCNC: 127 MMOL/L (ref 133–144)

## 2022-06-17 PROCEDURE — 99214 OFFICE O/P EST MOD 30 MIN: CPT | Performed by: NURSE PRACTITIONER

## 2022-06-17 PROCEDURE — 83735 ASSAY OF MAGNESIUM: CPT | Performed by: PATHOLOGY

## 2022-06-17 PROCEDURE — 80048 BASIC METABOLIC PNL TOTAL CA: CPT | Performed by: PATHOLOGY

## 2022-06-17 PROCEDURE — 86803 HEPATITIS C AB TEST: CPT | Performed by: INTERNAL MEDICINE

## 2022-06-17 PROCEDURE — 93000 ELECTROCARDIOGRAM COMPLETE: CPT | Performed by: INTERNAL MEDICINE

## 2022-06-17 PROCEDURE — 36415 COLL VENOUS BLD VENIPUNCTURE: CPT | Performed by: PATHOLOGY

## 2022-06-17 PROCEDURE — 84100 ASSAY OF PHOSPHORUS: CPT | Performed by: PATHOLOGY

## 2022-06-17 RX ORDER — SPIRONOLACTONE 25 MG/1
12.5 TABLET ORAL DAILY
Qty: 90 TABLET | Refills: 0 | Status: SHIPPED | OUTPATIENT
Start: 2022-06-17 | End: 2023-04-21

## 2022-06-17 NOTE — PROGRESS NOTES
"Today's Date: Jun 17, 2022     Patient Janette Rahman 1940 presents to the clinic today to address   Chief Complaint   Patient presents with     swoshing sound in neck, can hear a rythmic swishing     Can hear the blood flowing in her artery, mother has had previous strokes so she is concerned about strokes  Has had indigestion             SUBJECTIVE     History of Present Illness:    Patient presents to clinic today to discuss symptoms of \"swooshing sounds\" in her ear, dizziness, and blood pressure lability that has been ongoing last two weeks. Recently was seen by PCP Alyssa Lopez MD 6/3/22. Stopped amlodipine 10 mg due to edema and self reported low Bps at home. Was started spironolactone 25 mg daily. Other BP medication regimen includes losartan 100 mg daily & hydrochlorothiazide 25 mg daily. BMP 6/9/22 grossly normal showing no hypokalemia or e/o kidney dysfunction. Per chart review, has mild bilateral carotid artery stenosis diagnosed by carotid US 8/2019, currently on 20 mg atorvastatin daily. Per patient, blood pressures at home have ranged in the 90-100s SBP 50-70s DBP and heart rate has ranged between 50s-60s.    ROS:  Endorses indigestion and fatigue. Denies chest pain, jaw/shoulder/left arm pain, shortness of breath, dyspnea on exertion, facial droop, visual disturbances, headache, weakness, recent falls, speech difficulties or trouble swallowing.    No Known Allergies     Current Outpatient Medications   Medication Instructions     atorvastatin (LIPITOR) 20 mg, Oral, DAILY     cholecalciferol 1000 units TABS Oral     estradiol (ESTRACE) 2 g, Vaginal, TWICE WEEKLY     hydrochlorothiazide (HYDRODIURIL) 25 mg, Oral, DAILY     ibuprofen (ADVIL/MOTRIN) 200 mg, Oral, EVERY 4 HOURS PRN     losartan (COZAAR) 100 mg, Oral, DAILY     spironolactone (ALDACTONE) 25 mg, Oral, DAILY     Turmeric 500 MG CAPS DAILY     Past Medical, social, family histories, medications, and allergies reviewed and " updated.         OBJECTIVE     BP (!) 172/75 (BP Location: Right arm, Patient Position: Sitting, Cuff Size: Adult Regular)   Pulse 50   Resp 16   Ht 1.524 m (5')   Wt 61.1 kg (134 lb 11.2 oz)   LMP  (LMP Unknown)   SpO2 100%   BMI 26.31 kg/m       Constitutional: Awake, alert, cooperative, no apparent distress, and appears stated age.  Eyes: Lids and lashes normal, pupils equal, round and reactive to light, extra ocular muscles intact, sclera clear, conjunctiva normal.  HEENT: Normocephalic, without obvious abnormality, atramatic, external ears without lesions, oral pharynx with moist mucus membranes, tonsils without erythema or exudates, gums normal and good dentition.  Neck: Supple, symmetrical, trachea midline, no adenopathy, thyroid symmetric, not enlarged and no tenderness, skin normal.  Hematologic / Lymphatic: No cervical lymphadenopathy and no supraclavicular lymphadenopathy.  Lungs: No increased work of breathing, good air exchange, clear to auscultation bilaterally, no crackles or wheezing.  Cardiovascular: Regular rate and rhythm, normal S1 and S2, no S3 or S4, and no murmur noted.  Carotids: No bruits auscultated. No JVD.   Abdomen: No scars, normal bowel sounds, soft, non-distended, non-tender, no masses palpated, no hepatosplenomegally.  Musculoskeletal: No redness, warmth, or swelling of the joints. Full range of motion noted. Motor strength is 5 out of 5 all extremities bilaterally. Tone is normal.  Neurologic: Awake, alert, oriented to name, place and time. Cranial nerves II-XII are grossly intact. Motor is 5 out of 5 bilaterally. Cerebellar finger to nose, heel to shin intact. Sensory is intact. Romberg negative, and gait is normal.  Neuropsychiatric: Normal affect, mood, orientation, memory and insight.  Skin: No rashes, erythema, pallor, petechia or purpura.         ASSESSMENT/PLAN     (I10) Essential hypertension, benign  (primary encounter diagnosis)  (R42) Dizziness  - EKG 12-lead  complete w/read - Clinics, Sinus bradycardia HR 52 BMP, No ST elevation or abnormal Q waves.  - Neuro, cardiac, and respiratory exam without acute findings  - BMP + mag & phos rechecked today, no critical findings. K+ on low end of normal. Slight hyponatremia present.   - Carotid US bilateral, ordered, scheduled for 6/21/22 8:30 AM  - Reduce spironolactone to 12.5 mg daily as at-home readings have consistently been hypotensive  - Future considerations: increasing atorvastatin and adding daily aspirin pending carotid US results, will defer to PCP's judgement     Consulted with pharmacist Chelle Wagner PharmD on patient's plan of care and she is in agreement with plan.     Follow-Up:  - Follow up with PCP in 2 weeks.     Options for treatment and follow-up care were reviewed with the patient. Patient engaged in the decision making process and verbalized understanding of the options discussed and agreed with the final plan.  AVS printed and given to patient.    VERÓNICA Marcus [facility]  [address] [place] [address] [id number] [phone]

## 2022-06-17 NOTE — NURSING NOTE
Chief Complaint   Patient presents with     swoshing sound in neck, can hear a rythmic swishing     Can hear the blood flowing in her artery, mother has had previous strokes so she is concerned about strokes  Has had indigestion

## 2022-06-20 ENCOUNTER — MYC MEDICAL ADVICE (OUTPATIENT)
Dept: INTERNAL MEDICINE | Facility: CLINIC | Age: 82
End: 2022-06-20
Payer: COMMERCIAL

## 2022-06-20 LAB
ATRIAL RATE - MUSE: 52 BPM
DIASTOLIC BLOOD PRESSURE - MUSE: NORMAL MMHG
INTERPRETATION ECG - MUSE: NORMAL
P AXIS - MUSE: 32 DEGREES
PR INTERVAL - MUSE: 178 MS
QRS DURATION - MUSE: 86 MS
QT - MUSE: 434 MS
QTC - MUSE: 403 MS
R AXIS - MUSE: -2 DEGREES
SYSTOLIC BLOOD PRESSURE - MUSE: NORMAL MMHG
T AXIS - MUSE: 27 DEGREES
VENTRICULAR RATE- MUSE: 52 BPM

## 2022-06-21 ENCOUNTER — ANCILLARY PROCEDURE (OUTPATIENT)
Dept: ULTRASOUND IMAGING | Facility: CLINIC | Age: 82
End: 2022-06-21
Attending: NURSE PRACTITIONER
Payer: COMMERCIAL

## 2022-06-21 DIAGNOSIS — Z86.79 HX OF CAROTID ARTERY STENOSIS: ICD-10-CM

## 2022-06-21 DIAGNOSIS — I10 ESSENTIAL HYPERTENSION, BENIGN: ICD-10-CM

## 2022-06-21 DIAGNOSIS — R42 DIZZINESS: ICD-10-CM

## 2022-06-21 PROCEDURE — 93880 EXTRACRANIAL BILAT STUDY: CPT | Mod: GC | Performed by: RADIOLOGY

## 2022-07-01 ENCOUNTER — VIRTUAL VISIT (OUTPATIENT)
Dept: INTERNAL MEDICINE | Facility: CLINIC | Age: 82
End: 2022-07-01
Payer: COMMERCIAL

## 2022-07-01 DIAGNOSIS — E87.1 HYPONATREMIA: Primary | ICD-10-CM

## 2022-07-01 DIAGNOSIS — I10 ESSENTIAL HYPERTENSION, BENIGN: ICD-10-CM

## 2022-07-01 PROCEDURE — 99213 OFFICE O/P EST LOW 20 MIN: CPT | Mod: 95 | Performed by: INTERNAL MEDICINE

## 2022-07-01 NOTE — PATIENT INSTRUCTIONS
Thank you for visiting the Primary Care Center today at the [facility]! The following is some information about our clinic:     Primary Care Center Frequently-Asked Questions    (1) How do I schedule appointments at the [facility]?     Primary Care--to schedule or make changes to an existing appointment, please call our primary care line at [phone].    Labs--to schedule a lab appointment at the [facility] you can use [email or web address] or call [phone]. If you have a [facility] location that is closer to home, you can reach out to that location for scheduling options.     Imaging--if you need to schedule a CT, X-ray, MRI, ultrasound, or other imaging study you can call [phone] to schedule at the [facility] or any other [facility] imaging location.     Referrals--if a referral to another specialty was ordered you can expect a phone call from their scheduling team. If you have not heard from them in a week, please call us or send us a [email or web address] message to check the status or get a scheduling number. Please note that this only applies to internal [facility] referrals. If the referral is external you would need to contact their office for scheduling.     (2) I have a question about my visit, who do I contact?     You can call us at the primary care line at [phone] to ask questions about your visit. You can also send a secure message through [email or web address], which is reviewed by clinic staff. Please note that [email or web address] messages have a twenty-four to forty-eight business hour turnaround time and should not be used for urgent concerns.    (3) How will I get the results of my tests?    If you are signed up for [facility] all tests will be released to you within twenty-four hours of resulting. Please allow three to five days for your doctor to review your results and place a note interpreting the results. If you do not have [employer] you will receive your  results through mail seven to ten business days following the return of the tests. Please note that if there should be any urgent or concerning results that your doctor or their registered nurse will reach out to you the same day as the tests come back. If you have follow up questions about your results or would like to discuss the results in detail please schedule a follow up with your provider either in person or virtually.     (4) How do I get refills of my prescriptions?     You should always first contact your pharmacy for refills of your medications. If submitting a refill request on [facility], please be sure to submit the request only once--repeat requests can cause delays in refill. If you are requesting a NEW medication or a medication related to new symptoms you will need to schedule an appointment with a provider prior to approval. Please note: Routine medication refills have up to one to three business day turnaround whereas controlled substances refills have up to five to seven business day turnaround.    (5) I have new symptoms, what do I do?     If you are having an immediate medical emergency, you should dial 911 for assistance.   For anything urgent that needs to be seen within a few hours to one day you should visit a local urgent care for assistance.  For non-urgent symptoms that need to be seen within a few days to a week you can schedule with an available provider in primary care by going to [employer]ch[employer] or calling [phone].   If you are not sure how serious your symptoms are or you would like to receive medical advice you can always call [phone] to speak with a triage nurse.

## 2022-07-01 NOTE — PROGRESS NOTES
Janette is a 81 year old who is being evaluated via a billable video visit.      How would you like to obtain your AVS? MyChart  If the video visit is dropped, the invitation should be resent by: Text to cell phone: [phone]  Will anyone else be joining your video visit? Anna Myers VF

## 2022-07-07 DIAGNOSIS — I10 ESSENTIAL HYPERTENSION WITH GOAL BLOOD PRESSURE LESS THAN 140/90: ICD-10-CM

## 2022-07-07 RX ORDER — AMLODIPINE BESYLATE 10 MG/1
10 TABLET ORAL DAILY
Qty: 90 TABLET | Refills: 3 | Status: CANCELLED | OUTPATIENT
Start: 2022-07-07

## 2022-07-11 ENCOUNTER — LAB (OUTPATIENT)
Dept: LAB | Facility: CLINIC | Age: 82
End: 2022-07-11
Payer: COMMERCIAL

## 2022-07-11 DIAGNOSIS — I10 ESSENTIAL HYPERTENSION WITH GOAL BLOOD PRESSURE LESS THAN 140/90: ICD-10-CM

## 2022-07-11 DIAGNOSIS — I10 ESSENTIAL HYPERTENSION, BENIGN: ICD-10-CM

## 2022-07-11 DIAGNOSIS — E87.1 HYPONATREMIA: ICD-10-CM

## 2022-07-11 LAB
ANION GAP SERPL CALCULATED.3IONS-SCNC: 7 MMOL/L (ref 3–14)
BUN SERPL-MCNC: 11 MG/DL (ref 7–30)
CALCIUM SERPL-MCNC: 9.9 MG/DL (ref 8.5–10.1)
CHLORIDE BLD-SCNC: 98 MMOL/L (ref 94–109)
CO2 SERPL-SCNC: 28 MMOL/L (ref 20–32)
CREAT SERPL-MCNC: 0.56 MG/DL (ref 0.52–1.04)
GFR SERPL CREATININE-BSD FRML MDRD: >90 ML/MIN/1.73M2
GLUCOSE BLD-MCNC: 122 MG/DL (ref 70–99)
POTASSIUM BLD-SCNC: 3.7 MMOL/L (ref 3.4–5.3)
SODIUM SERPL-SCNC: 133 MMOL/L (ref 133–144)

## 2022-07-11 PROCEDURE — 80048 BASIC METABOLIC PNL TOTAL CA: CPT

## 2022-07-11 PROCEDURE — 36415 COLL VENOUS BLD VENIPUNCTURE: CPT

## 2022-07-12 RX ORDER — AMLODIPINE BESYLATE 10 MG/1
10 TABLET ORAL DAILY
Qty: 90 TABLET | Refills: 3 | OUTPATIENT
Start: 2022-07-12

## 2022-08-12 DIAGNOSIS — I10 ESSENTIAL HYPERTENSION WITH GOAL BLOOD PRESSURE LESS THAN 140/90: ICD-10-CM

## 2022-08-12 RX ORDER — LOSARTAN POTASSIUM 100 MG/1
100 TABLET ORAL DAILY
Qty: 90 TABLET | Refills: 0 | Status: SHIPPED | OUTPATIENT
Start: 2022-08-12 | End: 2022-10-27

## 2022-08-12 NOTE — TELEPHONE ENCOUNTER
"Requested Prescriptions   Pending Prescriptions Disp Refills    losartan (COZAAR) 100 MG tablet 90 tablet 0     Sig: Take 1 tablet (100 mg) by mouth daily        Angiotensin-II Receptors Failed - 8/12/2022  8:24 AM        Failed - Last blood pressure under 140/90 in past 12 months       BP Readings from Last 3 Encounters:   06/17/22 (!) 160/75   06/09/22 134/70   06/03/22 (!) 150/75                 Passed - Recent (12 mo) or future (30 days) visit within the authorizing provider's specialty     Patient has had an office visit with the authorizing provider or a provider within the authorizing providers department within the previous 12 mos or has a future within next 30 days. See \"Patient Info\" tab in inbasket, or \"Choose Columns\" in Meds & Orders section of the refill encounter.              Passed - Medication is active on med list        Passed - Patient is age 18 or older        Passed - No active pregnancy on record        Passed - Normal serum creatinine on file in past 12 months     Recent Labs   Lab Test 07/11/22  1432   CR 0.56       Ok to refill medication if creatinine is low          Passed - Normal serum potassium on file in past 12 months       Recent Labs   Lab Test 07/11/22  1432   POTASSIUM 3.7                    Passed - No positive pregnancy test in past 12 months              "

## 2022-08-18 DIAGNOSIS — R60.9 EDEMA, UNSPECIFIED TYPE: ICD-10-CM

## 2022-08-18 NOTE — TELEPHONE ENCOUNTER
hydrochlorothiazide (HYDRODIURIL) 25 MG     Last Written Prescription Date:  7/19/21  Last Fill Quantity: 90,   # refills: 3  Last Office Visit : 7/1/22  Future Office visit:  none  Routing refill request to provider for review/approval because:  Does PCC want to continue med /RF ?  Last RF BY Cal ALVARADO @ [facility]

## 2022-08-18 NOTE — TELEPHONE ENCOUNTER
[facility] Call Center    Phone Message    May a detailed message be left on voicemail: yes     Reason for Call: Medication Refill Request    Has the patient contacted the pharmacy for the refill? Yes   Name of medication being requested: hydrochlorothiazide (HYDRODIURIL) 25 MG tablet  Provider who prescribed the medication: Alyssa Lopez MD  Pharmacy: [facility] - [place], [place] - [address] [place] [address]  Date medication is needed: asap    Patient requesting meds are filled. Patient has 4 pills left. Pharmacy has contacted clinic 3-4 times and has received no response.      Action Taken: Message routed to:  [facility]    Travel Screening: Not Applicable

## 2022-08-19 DIAGNOSIS — R60.9 EDEMA, UNSPECIFIED TYPE: ICD-10-CM

## 2022-08-19 RX ORDER — HYDROCHLOROTHIAZIDE 25 MG/1
25 TABLET ORAL DAILY
Qty: 90 TABLET | Refills: 3 | Status: SHIPPED | OUTPATIENT
Start: 2022-08-19 | End: 2022-10-27

## 2022-08-19 RX ORDER — HYDROCHLOROTHIAZIDE 25 MG/1
25 TABLET ORAL DAILY
Qty: 90 TABLET | OUTPATIENT
Start: 2022-08-19

## 2022-08-19 NOTE — TELEPHONE ENCOUNTER
hydrochlorothiazide 25 mg tablet  Last Written Prescription Date:   7/19/2021  Last Fill Quantity: 90,   # refills: 3  Last Office Visit :  7/1/2022  Future Office visit:  None    Routing refill request to provider for review/approval because:  Last filled by [facility] provider.    But has established care with [facility] Provider.  Please send new updated order with updated Providers signature for Pt care.  Thank you       Darlene Draper RN  Central Triage Red Flags/Med Refills

## 2022-08-19 NOTE — TELEPHONE ENCOUNTER
Health Call Center    Phone Message    May a detailed message be left on voicemail: yes     Reason for Call: Medication Question or concern regarding medication   Prescription Clarification  Name of Medication: hydrochlorothiazide (HYDRODIURIL) 25 MG tablet  Prescribing Provider: Alyssa Lopez MD   Pharmacy: [facility] - [place], [place] - [address] [place] [address]   What on the order needs clarification? Patient calling back about the status of these meds. Patient noted that she was told meds were denied. Patient was wondering if it was accidentally sent to [place], [place] instead of her [place], [place] Pharmacy. Patient noted she wants Dr. Lopez to fill the meds since she is her pcp. Please call back patient since she is running low on meds.          Action Taken: Message routed to:  [facility]    Travel Screening: Not Applicable

## 2022-08-24 RX ORDER — HYDROCHLOROTHIAZIDE 25 MG/1
TABLET ORAL
Qty: 180 TABLET | Refills: 0 | OUTPATIENT
Start: 2022-08-24

## 2022-08-24 NOTE — TELEPHONE ENCOUNTER
hydrochlorothiazide 25 mg tablet  hydrochlorothiazide (HYDRODIURIL) 25 MG tablet 90 tablet 3 8/19/2022  No   Sig - Route: Take 1 tablet (25 mg) by mouth daily - Oral   Sent to pharmacy as: hydroCHLOROthiazide 25 MG Oral Tablet (HYDRODIURIL)   Class: E-Prescribe   Order: [id number]   E-Prescribing Status: Receipt confirmed by pharmacy (8/19/2022  3:51 PM CDT)       Printout Tracking    External Result Report     Pharmacy    [facility] - [place], [place] - [address] [place] [address]

## 2022-09-06 ENCOUNTER — ANCILLARY PROCEDURE (OUTPATIENT)
Dept: MAMMOGRAPHY | Facility: OTHER | Age: 82
End: 2022-09-06
Attending: INTERNAL MEDICINE
Payer: COMMERCIAL

## 2022-09-06 DIAGNOSIS — Z12.31 VISIT FOR SCREENING MAMMOGRAM: ICD-10-CM

## 2022-09-10 ENCOUNTER — HEALTH MAINTENANCE LETTER (OUTPATIENT)
Age: 82
End: 2022-09-10

## 2022-10-27 ENCOUNTER — OFFICE VISIT (OUTPATIENT)
Dept: URBAN - METROPOLITAN AREA CLINIC 28 | Facility: CLINIC | Age: 82
End: 2022-10-27
Payer: COMMERCIAL

## 2022-10-27 DIAGNOSIS — H40.013 OPEN ANGLE WITH BORDERLINE FINDINGS, LOW RISK, BILATERAL: Primary | ICD-10-CM

## 2022-10-27 DIAGNOSIS — E78.5 HYPERLIPIDEMIA, UNSPECIFIED HYPERLIPIDEMIA TYPE: ICD-10-CM

## 2022-10-27 DIAGNOSIS — H04.123 DRY EYE SYNDROME OF BILATERAL LACRIMAL GLANDS: ICD-10-CM

## 2022-10-27 DIAGNOSIS — I10 ESSENTIAL HYPERTENSION WITH GOAL BLOOD PRESSURE LESS THAN 140/90: ICD-10-CM

## 2022-10-27 DIAGNOSIS — H25.813 COMBINED FORMS OF AGE-RELATED CATARACT, BILATERAL: ICD-10-CM

## 2022-10-27 DIAGNOSIS — R60.9 EDEMA, UNSPECIFIED TYPE: ICD-10-CM

## 2022-10-27 PROCEDURE — 92133 CPTRZD OPH DX IMG PST SGM ON: CPT | Performed by: OPTOMETRIST

## 2022-10-27 PROCEDURE — 92004 COMPRE OPH EXAM NEW PT 1/>: CPT | Performed by: OPTOMETRIST

## 2022-10-27 RX ORDER — LOTEPREDNOL ETABONATE 2.5 MG/ML
0.25 % SUSPENSION/ DROPS OPHTHALMIC
Qty: 8.3 | Refills: 3 | Status: ACTIVE
Start: 2022-10-27

## 2022-10-27 RX ORDER — HYDROCHLOROTHIAZIDE 25 MG/1
25 TABLET ORAL DAILY
Qty: 90 TABLET | Refills: 2 | Status: SHIPPED | OUTPATIENT
Start: 2022-10-27 | End: 2023-06-16

## 2022-10-27 RX ORDER — ATORVASTATIN CALCIUM 20 MG/1
20 TABLET, FILM COATED ORAL DAILY
Qty: 90 TABLET | Refills: 2 | Status: SHIPPED | OUTPATIENT
Start: 2022-10-27 | End: 2023-06-16

## 2022-10-27 RX ORDER — LOSARTAN POTASSIUM 100 MG/1
100 TABLET ORAL DAILY
Qty: 90 TABLET | Refills: 0 | Status: SHIPPED | OUTPATIENT
Start: 2022-10-27 | End: 2022-11-02

## 2022-10-27 ASSESSMENT — INTRAOCULAR PRESSURE
OD: 24
OS: 22

## 2022-10-27 ASSESSMENT — KERATOMETRY
OS: 46.00
OD: 45.63

## 2022-10-27 NOTE — IMPRESSION/PLAN
Impression: Dry eye syndrome of bilateral lacrimal glands: H04.123. Plan: Discussed diagnosis in detail with patient. Discussed treatment options with patient. Patient instructed to use artificial tears as instructed. Use ointment/gel at night as instructed. Will continue to observe condition and or symptoms. New medication(s) Eysuvis BID Rx Sent today. Patient instructed to call if condition gets worse.

## 2022-10-27 NOTE — TELEPHONE ENCOUNTER
atorvastatin (LIPITOR) 20 MG tablet      Last Written Prescription Date:  7/19/21 by Alyssa Membreno  Last Fill Quantity: 90,   # refills: 3  Last Office Visit : 7/1/22  Future Office visit:  None scheduled    Routing refill request to provider for review/approval because:  Not prescribed by requested provider      hydrochlorothiazide (HYDRODIURIL) 25 MG tablet      Last Written Prescription Date:  8/19/22  Last Fill Quantity: 90,   # refills: 3  Last Office Visit : 7/1/22  Future Office visit:  None scheduled    Routing refill request to provider for review/approval because:  Blood pressure out of range   BP Readings from Last 3 Encounters:   06/17/22 (!) 160/75   06/09/22 134/70   06/03/22 (!) 150/75     Remaining refills sent to requested pharmacy.    losartan (COZAAR) 100 MG tablet      Last Written Prescription Date:  8/12/22  Last Fill Quantity: 90,   # refills: 0  Last Office Visit : 7/1/22  Future Office visit:  None schedule    Routing refill request to provider for review/approval because:  Blood pressure out of range   BP Readings from Last 3 Encounters:   06/17/22 (!) 160/75   06/09/22 134/70   06/03/22 (!) 150/75     90 day refill per protocol, routed to clinic for follow up

## 2022-10-27 NOTE — TELEPHONE ENCOUNTER
[facility] Call Center    Phone Message    May a detailed message be left on voicemail: yes     Reason for Call: Medication Refill Request    Has the patient contacted the pharmacy for the refill? Yes   Name of medication being requested:    losartan (COZAAR) 100 MG tablet      atorvastatin (LIPITOR) 20 MG tablet      hydrochlorothiazide (HYDRODIURIL) 25 MG tablet     Provider who prescribed the medication: Dr. Lopez  Pharmacy: [facility]/pharmacy #[id number] - [place], [place] - [address]  Date medication is needed: as soon as possible, per patient it was originally sent to wrong provider.          Action Taken: Message routed to:  [facility] med refill    Travel Screening: Not Applicable

## 2022-10-27 NOTE — IMPRESSION/PLAN
Impression: Open angle with borderline findings, low risk, bilateral: H40.013. Plan: Discussed diagnosis in detail with patient. Discussed treatment options with patient. OCT ON ordered & reviewed with patient No change to current treatment. Will continue to observe condition and or symptoms. Continue using current medication(s). Emphasized and explained compliance.

## 2022-10-29 DIAGNOSIS — I10 ESSENTIAL HYPERTENSION WITH GOAL BLOOD PRESSURE LESS THAN 140/90: ICD-10-CM

## 2022-11-02 RX ORDER — LOSARTAN POTASSIUM 100 MG/1
100 TABLET ORAL DAILY
Qty: 90 TABLET | Refills: 0 | Status: SHIPPED | OUTPATIENT
Start: 2022-11-02 | End: 2023-01-25

## 2022-11-02 NOTE — TELEPHONE ENCOUNTER
losartan 100 mg tablet  Resent order to updated alternative pharmacy for Pt care.  90 Tabs sent to New pharmacy for Pt care.    Darlene Draper RN  Central Triage Red Flags/Med Refills      Warnings Override History for losartan (COZAAR) 100 MG tablet [[id number]]    Overridden by Rachel Lake RN on Oct 27, 2022 3:12 PM   Drug-Drug   1. ANGIOTENSIN II RECEPTOR ANTAGONISTS / POTASSIUM-SPARING DIURETICS [Level: Major] [Reason: Tolerated medication/side effects in past]   Other Orders: spironolactone (ALDACTONE) 25 MG tablet

## 2023-01-21 ENCOUNTER — TELEPHONE (OUTPATIENT)
Dept: INTERNAL MEDICINE | Facility: CLINIC | Age: 83
End: 2023-01-21
Payer: COMMERCIAL

## 2023-01-21 DIAGNOSIS — I10 ESSENTIAL HYPERTENSION WITH GOAL BLOOD PRESSURE LESS THAN 140/90: ICD-10-CM

## 2023-01-25 RX ORDER — LOSARTAN POTASSIUM 100 MG/1
100 TABLET ORAL DAILY
Qty: 90 TABLET | Refills: 1 | Status: SHIPPED | OUTPATIENT
Start: 2023-01-25 | End: 2023-06-16

## 2023-01-25 RX ORDER — LOSARTAN POTASSIUM 100 MG/1
100 TABLET ORAL DAILY
Qty: 90 TABLET | Refills: 1 | Status: SHIPPED | OUTPATIENT
Start: 2023-01-25 | End: 2023-01-25

## 2023-01-25 NOTE — TELEPHONE ENCOUNTER
LOSARTAN POTASSIUM 100 MG TAB      Last Written Prescription Date:  11/2/22  Last Fill Quantity: 90 ,   # refills: 0  Last Office Visit : 7/1/22  Future Office visit:  none    Routing refill request to provider for review/approval because:  Blood pressure out of range   06/17/22 (!) 160/75   06/09/22 134/70   06/03/22 (!) 150/75     Already given 90d darien refill. No future appt scheduled.

## 2023-01-25 NOTE — TELEPHONE ENCOUNTER
RN called patient and let her know Rx was sent to preferred pharmacy.    Sejal Gibson RN on 1/25/2023 at 3:46 PM

## 2023-01-25 NOTE — TELEPHONE ENCOUNTER
[facility] Call Center    Phone Message    May a detailed message be left on voicemail: yes     Reason for Call: Medication Question or concern regarding medication   Prescription Clarification  Name of Medication: losartan (COZAAR) 100 MG tablet  Prescribing Provider: Alyssa Lopez MD     Pharmacy: (Pharmacy Update) [facility]   Address: [address] [place] [id number]  Phone: [phone]   What on the order needs clarification?   The patient would like for the refill to be resent to her updated pharmacy please follow up with patient once order has been sent thank you.          Action Taken: Message routed to:  [facility] pcc    Travel Screening: Not Applicable

## 2023-04-20 ENCOUNTER — TELEPHONE (OUTPATIENT)
Dept: INTERNAL MEDICINE | Facility: CLINIC | Age: 83
End: 2023-04-20
Payer: COMMERCIAL

## 2023-04-20 DIAGNOSIS — I10 ESSENTIAL HYPERTENSION, BENIGN: ICD-10-CM

## 2023-04-20 NOTE — TELEPHONE ENCOUNTER
[facility] Call Center    Phone Message    May a detailed message be left on voicemail: yes     Reason for Call: Other: Patient requesting spironolactone (ALDACTONE) 25 MG tablet, be sent to [address] [address] [phone]. Thank you     Action Taken: Message routed to:  [facility] pcc    Travel Screening: Not Applicable

## 2023-04-21 RX ORDER — SPIRONOLACTONE 25 MG/1
12.5 TABLET ORAL DAILY
Qty: 45 TABLET | Refills: 3 | Status: SHIPPED | OUTPATIENT
Start: 2023-04-21 | End: 2023-06-16

## 2023-04-21 NOTE — TELEPHONE ENCOUNTER
Pt was last seen in clinic on 7/1/22. Pt last had spironolactone (ALDACTONE) 25 MG tablet refilled on 10/24/22 for a 90 day supply by Jazmyn Kaur NP. Due for refill 1/22/23. Routed to provider for refill approval.     JACIEL WALSH RN on 4/21/2023 at 10:07 AM

## 2023-05-17 NOTE — PROGRESS NOTES
HISTORY OF PRESENT ILLNESS    Janette Rahman is an 82 year old female who is seen in follow-up for evaluation of  right knee   Osteoarthritis and medial meniscus and lateral meniscus tears based on a 12/7/20 MRI. Despite mechanical symptoms at times, corticosteroid injections have helped and she doesn't want surgery.      Treatments tried to this point:   Because of the lack of mechanical symptoms, Corticosteroid injection 12/10/20.  Length of effectiveness: 5-6 months  Corticosteroid injection 6/2/21: close to one years.  Cortisone injection 6/9/22: 10+ months.    Also has right shoulder pain, suspected glenohumeral joint osteoarthritis, and a question of rotator cuff tear (history of previous rotator cuff repair, and some superior migration of the humeral head on xrays. We don't have a good en face view of the glenohumeral joint on xray)   Glenohumeral joint corticosteroid injection done a year ago lasted for 10+ months as well.      KNEE EXAM:     Alignment: normal       ROM: very good  Effusion: mild  Tender: medial joint line    Ligaments:  Lachman's Stable, Anterior and posterior drawer stable, stable to varus and valgus stress.  no pain with varus-valgus stress testing.    Patellofemoral joint: mild crepitations in the patellofemoral joint.      right shoulder:  Good range of motion, except has only 30 degrees ER.  Good rotator cuff strength.    Xrays:  None new     Impression:     ICD-10-CM    1. Osteoarthritis of glenohumeral joint, right  M19.011 Orthopedic  Referral      2. Primary osteoarthritis of right knee  M17.11       3. Other tear of medial meniscus of right knee, unspecified whether old or current tear, initial encounter  S83.241A       4. Other tear of lateral meniscus of right knee, unspecified whether old or current tear, initial encounter  S83.281A           Plan:  Injection therapy: Discussed findings and diagnosis with patient.  We talked about treatment options.  We decided  that repeat corticosteroid injection would be the best option.  Thus, With the patient's consent, right knee(s) injected intra-articularly with 80mg of Depomedrol and 4cc of local anesthetic after sterile prep.       Image guided corticosteroid injection of the glenohumeral joint was ordered again today.    If injections continue to help for close to a year, I don't think other options need to be considered.    Return to clinic as needed      GIANNA Schultz MD  Dept. Orthopedic Surgery  [facility]

## 2023-05-18 ENCOUNTER — OFFICE VISIT (OUTPATIENT)
Dept: ORTHOPEDICS | Facility: CLINIC | Age: 83
End: 2023-05-18
Payer: COMMERCIAL

## 2023-05-18 VITALS — HEART RATE: 59 BPM | SYSTOLIC BLOOD PRESSURE: 152 MMHG | OXYGEN SATURATION: 98 % | DIASTOLIC BLOOD PRESSURE: 77 MMHG

## 2023-05-18 DIAGNOSIS — M19.011 OSTEOARTHRITIS OF GLENOHUMERAL JOINT, RIGHT: Primary | ICD-10-CM

## 2023-05-18 DIAGNOSIS — S83.241A OTHER TEAR OF MEDIAL MENISCUS OF RIGHT KNEE, UNSPECIFIED WHETHER OLD OR CURRENT TEAR, INITIAL ENCOUNTER: ICD-10-CM

## 2023-05-18 DIAGNOSIS — S83.281A OTHER TEAR OF LATERAL MENISCUS OF RIGHT KNEE, UNSPECIFIED WHETHER OLD OR CURRENT TEAR, INITIAL ENCOUNTER: ICD-10-CM

## 2023-05-18 DIAGNOSIS — M17.11 PRIMARY OSTEOARTHRITIS OF RIGHT KNEE: ICD-10-CM

## 2023-05-18 PROCEDURE — 20610 DRAIN/INJ JOINT/BURSA W/O US: CPT | Mod: RT | Performed by: ORTHOPAEDIC SURGERY

## 2023-05-18 RX ORDER — METHYLPREDNISOLONE ACETATE 80 MG/ML
80 INJECTION, SUSPENSION INTRA-ARTICULAR; INTRALESIONAL; INTRAMUSCULAR; SOFT TISSUE
Status: DISCONTINUED | OUTPATIENT
Start: 2023-05-18 | End: 2023-06-16

## 2023-05-18 RX ORDER — LIDOCAINE HYDROCHLORIDE 10 MG/ML
4 INJECTION, SOLUTION EPIDURAL; INFILTRATION; INTRACAUDAL; PERINEURAL
Status: DISCONTINUED | OUTPATIENT
Start: 2023-05-18 | End: 2023-06-16

## 2023-05-18 RX ADMIN — LIDOCAINE HYDROCHLORIDE 4 ML: 10 INJECTION, SOLUTION EPIDURAL; INFILTRATION; INTRACAUDAL; PERINEURAL at 14:39

## 2023-05-18 RX ADMIN — METHYLPREDNISOLONE ACETATE 80 MG: 80 INJECTION, SUSPENSION INTRA-ARTICULAR; INTRALESIONAL; INTRAMUSCULAR; SOFT TISSUE at 14:39

## 2023-05-18 ASSESSMENT — PAIN SCALES - GENERAL: PAINLEVEL: MODERATE PAIN (4)

## 2023-05-18 NOTE — PATIENT INSTRUCTIONS
AFTER VISIT SUMMARY    [facility] CORTISONE Injection Discharge Instructions    You may shower, however avoid swimming, tub baths or hot tubs for 24 hours following your procedure    You may have a mild to moderate increase in pain for several days following the injection.    It may take up to 14 days for the steroid medication to start working although you may feel the effect as early as a few days after the procedure.    You may use ice packs for 10-15 minutes, 3 to 4 times a day at the injection site for comfort    You may use anti-inflammatory medications (such as Ibuprofen or Aleve or Advil) or Tylenol for pain control if necessary    If you were fasting, you may resume your normal diet and medications after the procedure    If you have diabetes, check your blood sugar more frequently than usual as your blood sugar may be higher than normal for 10-14 days following a steroid injection. Contact your doctor who manages your diabetes if your blood sugar is higher than usual      If you experience any of the following, call [facility] [phone]  -Fever over 100 degree F  -Swelling, bleeding, redness, drainage, warmth at the injection site  - New or worsening pain

## 2023-05-18 NOTE — PROGRESS NOTES
Large Joint Injection/Arthocentesis: R knee joint    Date/Time: 5/18/2023 2:39 PM    Performed by: Ho Mendoza  Authorized by: David Schultz MD    Indications:  Pain and osteoarthritis  Needle Size:  22 G  Guidance: landmark guided    Approach:  Anterolateral  Location:  Knee      Medications:  80 mg methylPREDNISolone 80 MG/ML; 4 mL lidocaine (PF) 1 %  Outcome:  Tolerated well, no immediate complications  Procedure discussed: discussed risks, benefits, and alternatives    Consent Given by:  Patient  Timeout: timeout called immediately prior to procedure    Prep: patient was prepped and draped in usual sterile fashion

## 2023-05-18 NOTE — LETTER
5/18/2023         RE: Janette Anderson  [address] [place] [address]        Dear Colleague,    Thank you for referring your patient, Janette Anderson, to the [facility]. Please see a copy of my visit note below.    HISTORY OF PRESENT ILLNESS    Janette Rahman is an 82 year old female who is seen in follow-up for evaluation of  right knee   Osteoarthritis and medial meniscus and lateral meniscus tears based on a 12/7/20 MRI. Despite mechanical symptoms at times, corticosteroid injections have helped and she doesn't want surgery.      Treatments tried to this point:   Because of the lack of mechanical symptoms, Corticosteroid injection 12/10/20.  Length of effectiveness: 5-6 months  Corticosteroid injection 6/2/21: close to one years.  Cortisone injection 6/9/22: 10+ months.    Also has right shoulder pain, suspected glenohumeral joint osteoarthritis, and a question of rotator cuff tear (history of previous rotator cuff repair, and some superior migration of the humeral head on xrays. We don't have a good en face view of the glenohumeral joint on xray)   Glenohumeral joint corticosteroid injection done a year ago lasted for 10+ months as well.      KNEE EXAM:     Alignment: normal       ROM: very good  Effusion: mild  Tender: medial joint line    Ligaments:  Lachman's Stable, Anterior and posterior drawer stable, stable to varus and valgus stress.  no pain with varus-valgus stress testing.    Patellofemoral joint: mild crepitations in the patellofemoral joint.      right shoulder:  Good range of motion, except has only 30 degrees ER.  Good rotator cuff strength.    Xrays:  None new     Impression:     ICD-10-CM    1. Osteoarthritis of glenohumeral joint, right  M19.011 Orthopedic  Referral      2. Primary osteoarthritis of right knee  M17.11       3. Other tear of medial meniscus of right knee, unspecified whether old or current tear, initial encounter  S83.876O        4. Other tear of lateral meniscus of right knee, unspecified whether old or current tear, initial encounter  S83.085Y           Plan:  Injection therapy: Discussed findings and diagnosis with patient.  We talked about treatment options.  We decided that repeat corticosteroid injection would be the best option.  Thus, With the patient's consent, right knee(s) injected intra-articularly with 80mg of Depomedrol and 4cc of local anesthetic after sterile prep.       Image guided corticosteroid injection of the glenohumeral joint was ordered again today.    If injections continue to help for close to a year, I don't think other options need to be considered.    Return to clinic as needed      GIANNA Schultz MD  Dept. Orthopedic Surgery  [facility]      Large Joint Injection/Arthocentesis: R knee joint    Date/Time: 5/18/2023 2:39 PM    Performed by: Ho Mendoza  Authorized by: David Schultz MD    Indications:  Pain and osteoarthritis  Needle Size:  22 G  Guidance: landmark guided    Approach:  Anterolateral  Location:  Knee      Medications:  80 mg methylPREDNISolone 80 MG/ML; 4 mL lidocaine (PF) 1 %  Outcome:  Tolerated well, no immediate complications  Procedure discussed: discussed risks, benefits, and alternatives    Consent Given by:  Patient  Timeout: timeout called immediately prior to procedure    Prep: patient was prepped and draped in usual sterile fashion              Again, thank you for allowing me to participate in the care of your patient.        Sincerely,        David Schultz MD

## 2023-06-06 ENCOUNTER — OFFICE VISIT (OUTPATIENT)
Dept: ORTHOPEDICS | Facility: CLINIC | Age: 83
End: 2023-06-06
Attending: ORTHOPAEDIC SURGERY
Payer: COMMERCIAL

## 2023-06-06 DIAGNOSIS — M19.011 OSTEOARTHRITIS OF GLENOHUMERAL JOINT, RIGHT: ICD-10-CM

## 2023-06-06 PROCEDURE — 20611 DRAIN/INJ JOINT/BURSA W/US: CPT | Mod: RT | Performed by: FAMILY MEDICINE

## 2023-06-06 RX ADMIN — BETAMETHASONE SODIUM PHOSPHATE AND BETAMETHASONE ACETATE 6 MG: 3; 3 INJECTION, SUSPENSION INTRA-ARTICULAR; INTRALESIONAL; INTRAMUSCULAR; SOFT TISSUE at 15:15

## 2023-06-06 RX ADMIN — ROPIVACAINE HYDROCHLORIDE 4 ML: 5 INJECTION, SOLUTION EPIDURAL; INFILTRATION; PERINEURAL at 15:15

## 2023-06-06 NOTE — PROGRESS NOTES
Large Joint Injection/Arthocentesis: R glenohumeral joint    Date/Time: 6/6/2023 3:15 PM    Performed by: Angelito Mandujano MD  Authorized by: Angelito Mandujano MD    Indications:  Pain and osteoarthritis  Needle Size:  25 G  Guidance: ultrasound    Approach:  Posterolateral  Location:  Shoulder      Site:  R glenohumeral joint  Medications:  6 mg betamethasone acet & sod phos 6 (3-3) MG/ML; 4 mL ropivacaine 5 MG/ML  Outcome:  Tolerated well, no immediate complications  Procedure discussed: discussed risks, benefits, and alternatives    Consent Given by:  Patient  Timeout: timeout called immediately prior to procedure    Prep: patient was prepped and draped in usual sterile fashion     Ultrasound images of procedure were permanently stored.   Referred by Dr. Schultz     Patient reported improvement of pain after the numbing portion right glenohumeral joint steroid injection.  Ultrasound guided images were permanently stored.  Aftercare instructions given to patient.  Plan to follow-up as previously discussed with referring provider.     Angelito Mandujano MD [facility]

## 2023-06-06 NOTE — LETTER
6/6/2023         RE: Janette Anderson  [address] [facility] [place] [address]        Dear Colleague,    Thank you for referring your patient, Janette Anderson, to the [facility]. Please see a copy of my visit note below.    Large Joint Injection/Arthocentesis: R glenohumeral joint    Date/Time: 6/6/2023 3:15 PM    Performed by: Angelito Mandujano MD  Authorized by: Angelito Mandujano MD    Indications:  Pain and osteoarthritis  Needle Size:  25 G  Guidance: ultrasound    Approach:  Posterolateral  Location:  Shoulder      Site:  R glenohumeral joint  Medications:  6 mg betamethasone acet & sod phos 6 (3-3) MG/ML; 4 mL ropivacaine 5 MG/ML  Outcome:  Tolerated well, no immediate complications  Procedure discussed: discussed risks, benefits, and alternatives    Consent Given by:  Patient  Timeout: timeout called immediately prior to procedure    Prep: patient was prepped and draped in usual sterile fashion     Ultrasound images of procedure were permanently stored.   Referred by Dr. Schultz     Patient reported improvement of pain after the numbing portion right glenohumeral joint steroid injection.  Ultrasound guided images were permanently stored.  Aftercare instructions given to patient.  Plan to follow-up as previously discussed with referring provider.     Angelito Mandujano MD [facility]              Again, thank you for allowing me to participate in the care of your patient.        Sincerely,        Angelito Mandujano MD     Patient tolerated procedure well.

## 2023-06-06 NOTE — PATIENT INSTRUCTIONS
[facility]charge [facility]    Procedure: Right glenohumeral joint steroid injection     You may shower, however avoid swimming, tub baths or hot tubs for 24 hours following your procedure  You may have a mild to moderate increase in pain for several days following the injection.  It may take up to 14 days for the steroid medication to start working although you may feel the effect as early as a few days after the procedure.  You may use ice packs for 10-15 minutes, 3 to 4 times a day at the injection site for comfort  You may use anti-inflammatory medications (such as Ibuprofen or Aleve or Advil) or Tylenol for pain control if necessary  If you were fasting, you may resume your normal diet and medications after the procedure  If you have diabetes, check your blood sugar more frequently than usual as your blood sugar may be higher than normal for 10-14 days following a steroid injection. Contact your doctor who manages your diabetes if your blood sugar is higher than usual    If you experience any of the following, call [facility] @ [phone] or [phone]  -Fever over 100 degree F  -Swelling, bleeding, redness, drainage, warmth at the injection site  - New or worsening pain     It was great seeing you today!    Angelito Mandujano

## 2023-06-08 RX ORDER — ROPIVACAINE HYDROCHLORIDE 5 MG/ML
4 INJECTION, SOLUTION EPIDURAL; INFILTRATION; PERINEURAL
Status: DISCONTINUED | OUTPATIENT
Start: 2023-06-06 | End: 2024-06-28

## 2023-06-08 RX ORDER — BETAMETHASONE SODIUM PHOSPHATE AND BETAMETHASONE ACETATE 3; 3 MG/ML; MG/ML
6 INJECTION, SUSPENSION INTRA-ARTICULAR; INTRALESIONAL; INTRAMUSCULAR; SOFT TISSUE
Status: DISCONTINUED | OUTPATIENT
Start: 2023-06-06 | End: 2024-06-28

## 2023-06-13 ASSESSMENT — ENCOUNTER SYMPTOMS
HOT FLASHES: 0
NAIL CHANGES: 0
DECREASED CONCENTRATION: 0
DEPRESSION: 0
INSOMNIA: 0
SKIN CHANGES: 0
PANIC: 0
POOR WOUND HEALING: 0
NERVOUS/ANXIOUS: 1
DECREASED LIBIDO: 0

## 2023-06-16 ENCOUNTER — LAB (OUTPATIENT)
Dept: LAB | Facility: CLINIC | Age: 83
End: 2023-06-16
Payer: COMMERCIAL

## 2023-06-16 ENCOUNTER — TELEPHONE (OUTPATIENT)
Dept: OTOLARYNGOLOGY | Facility: CLINIC | Age: 83
End: 2023-06-16

## 2023-06-16 ENCOUNTER — ANCILLARY PROCEDURE (OUTPATIENT)
Dept: GENERAL RADIOLOGY | Facility: CLINIC | Age: 83
End: 2023-06-16
Attending: INTERNAL MEDICINE
Payer: COMMERCIAL

## 2023-06-16 ENCOUNTER — OFFICE VISIT (OUTPATIENT)
Dept: INTERNAL MEDICINE | Facility: CLINIC | Age: 83
End: 2023-06-16
Payer: COMMERCIAL

## 2023-06-16 VITALS
HEART RATE: 52 BPM | BODY MASS INDEX: 26.97 KG/M2 | SYSTOLIC BLOOD PRESSURE: 172 MMHG | OXYGEN SATURATION: 99 % | WEIGHT: 137.4 LBS | HEIGHT: 60 IN | DIASTOLIC BLOOD PRESSURE: 91 MMHG

## 2023-06-16 DIAGNOSIS — E78.5 HYPERLIPIDEMIA, UNSPECIFIED HYPERLIPIDEMIA TYPE: ICD-10-CM

## 2023-06-16 DIAGNOSIS — L29.9 ITCHING: Primary | ICD-10-CM

## 2023-06-16 DIAGNOSIS — R60.9 EDEMA, UNSPECIFIED TYPE: ICD-10-CM

## 2023-06-16 DIAGNOSIS — T16.9XXA FOREIGN BODY IN EAR, UNSPECIFIED LATERALITY, INITIAL ENCOUNTER: ICD-10-CM

## 2023-06-16 DIAGNOSIS — M85.89 OSTEOPENIA OF MULTIPLE SITES: ICD-10-CM

## 2023-06-16 DIAGNOSIS — Z78.0 POST-MENOPAUSAL: ICD-10-CM

## 2023-06-16 DIAGNOSIS — Z00.00 ENCOUNTER FOR MEDICARE ANNUAL WELLNESS EXAM: ICD-10-CM

## 2023-06-16 DIAGNOSIS — I10 ESSENTIAL HYPERTENSION WITH GOAL BLOOD PRESSURE LESS THAN 140/90: ICD-10-CM

## 2023-06-16 DIAGNOSIS — I10 ESSENTIAL HYPERTENSION, BENIGN: ICD-10-CM

## 2023-06-16 DIAGNOSIS — E55.9 VITAMIN D DEFICIENCY: ICD-10-CM

## 2023-06-16 DIAGNOSIS — L29.9 ITCHING: ICD-10-CM

## 2023-06-16 LAB
ALBUMIN SERPL BCG-MCNC: 4.7 G/DL (ref 3.5–5.2)
ALP SERPL-CCNC: 48 U/L (ref 35–104)
ALT SERPL W P-5'-P-CCNC: 20 U/L (ref 0–50)
ANION GAP SERPL CALCULATED.3IONS-SCNC: 10 MMOL/L (ref 7–15)
AST SERPL W P-5'-P-CCNC: 24 U/L (ref 0–45)
BASOPHILS # BLD AUTO: 0 10E3/UL (ref 0–0.2)
BASOPHILS NFR BLD AUTO: 1 %
BILIRUB SERPL-MCNC: 0.7 MG/DL
BUN SERPL-MCNC: 11.9 MG/DL (ref 8–23)
CALCIUM SERPL-MCNC: 9.6 MG/DL (ref 8.8–10.2)
CHLORIDE SERPL-SCNC: 94 MMOL/L (ref 98–107)
CHOLEST SERPL-MCNC: 193 MG/DL
CREAT SERPL-MCNC: 0.62 MG/DL (ref 0.51–0.95)
DEPRECATED CALCIDIOL+CALCIFEROL SERPL-MC: 44 UG/L (ref 20–75)
DEPRECATED HCO3 PLAS-SCNC: 27 MMOL/L (ref 22–29)
EOSINOPHIL # BLD AUTO: 0.5 10E3/UL (ref 0–0.7)
EOSINOPHIL NFR BLD AUTO: 9 %
ERYTHROCYTE [DISTWIDTH] IN BLOOD BY AUTOMATED COUNT: 12.6 % (ref 10–15)
GFR SERPL CREATININE-BSD FRML MDRD: 90 ML/MIN/1.73M2
GLUCOSE SERPL-MCNC: 99 MG/DL (ref 70–99)
HCT VFR BLD AUTO: 39.7 % (ref 35–47)
HDLC SERPL-MCNC: 77 MG/DL
HGB BLD-MCNC: 13.7 G/DL (ref 11.7–15.7)
IMM GRANULOCYTES # BLD: 0 10E3/UL
IMM GRANULOCYTES NFR BLD: 0 %
LDLC SERPL CALC-MCNC: 104 MG/DL
LYMPHOCYTES # BLD AUTO: 0.9 10E3/UL (ref 0.8–5.3)
LYMPHOCYTES NFR BLD AUTO: 17 %
MCH RBC QN AUTO: 31.8 PG (ref 26.5–33)
MCHC RBC AUTO-ENTMCNC: 34.5 G/DL (ref 31.5–36.5)
MCV RBC AUTO: 92 FL (ref 78–100)
MONOCYTES # BLD AUTO: 0.6 10E3/UL (ref 0–1.3)
MONOCYTES NFR BLD AUTO: 10 %
NEUTROPHILS # BLD AUTO: 3.5 10E3/UL (ref 1.6–8.3)
NEUTROPHILS NFR BLD AUTO: 63 %
NONHDLC SERPL-MCNC: 116 MG/DL
NRBC # BLD AUTO: 0 10E3/UL
NRBC BLD AUTO-RTO: 0 /100
PLATELET # BLD AUTO: 216 10E3/UL (ref 150–450)
POTASSIUM SERPL-SCNC: 3.5 MMOL/L (ref 3.4–5.3)
PROT SERPL-MCNC: 7.2 G/DL (ref 6.4–8.3)
RBC # BLD AUTO: 4.31 10E6/UL (ref 3.8–5.2)
SODIUM SERPL-SCNC: 131 MMOL/L (ref 136–145)
TRIGL SERPL-MCNC: 58 MG/DL
TSH SERPL DL<=0.005 MIU/L-ACNC: 1.72 UIU/ML (ref 0.3–4.2)
WBC # BLD AUTO: 5.5 10E3/UL (ref 4–11)

## 2023-06-16 PROCEDURE — 80061 LIPID PANEL: CPT | Performed by: PATHOLOGY

## 2023-06-16 PROCEDURE — 85025 COMPLETE CBC W/AUTO DIFF WBC: CPT | Performed by: PATHOLOGY

## 2023-06-16 PROCEDURE — 36415 COLL VENOUS BLD VENIPUNCTURE: CPT | Performed by: PATHOLOGY

## 2023-06-16 PROCEDURE — 71046 X-RAY EXAM CHEST 2 VIEWS: CPT | Mod: GC | Performed by: RADIOLOGY

## 2023-06-16 PROCEDURE — 82306 VITAMIN D 25 HYDROXY: CPT | Performed by: INTERNAL MEDICINE

## 2023-06-16 PROCEDURE — 84443 ASSAY THYROID STIM HORMONE: CPT | Performed by: PATHOLOGY

## 2023-06-16 PROCEDURE — G0439 PPPS, SUBSEQ VISIT: HCPCS | Performed by: INTERNAL MEDICINE

## 2023-06-16 PROCEDURE — 80053 COMPREHEN METABOLIC PANEL: CPT | Performed by: PATHOLOGY

## 2023-06-16 RX ORDER — HYDROCHLOROTHIAZIDE 25 MG/1
25 TABLET ORAL DAILY
Qty: 90 TABLET | Refills: 3 | Status: SHIPPED | OUTPATIENT
Start: 2023-06-16 | End: 2024-04-11

## 2023-06-16 RX ORDER — SPIRONOLACTONE 25 MG/1
25 TABLET ORAL DAILY
Qty: 90 TABLET | Refills: 3 | Status: SHIPPED | OUTPATIENT
Start: 2023-06-16 | End: 2023-06-23

## 2023-06-16 RX ORDER — GABAPENTIN 300 MG/1
300 CAPSULE ORAL AT BEDTIME
Qty: 90 CAPSULE | Refills: 1 | Status: SHIPPED | OUTPATIENT
Start: 2023-06-16 | End: 2023-07-14

## 2023-06-16 RX ORDER — ATORVASTATIN CALCIUM 20 MG/1
20 TABLET, FILM COATED ORAL DAILY
Qty: 90 TABLET | Refills: 3 | Status: SHIPPED | OUTPATIENT
Start: 2023-06-16 | End: 2024-06-28

## 2023-06-16 RX ORDER — LOSARTAN POTASSIUM 100 MG/1
100 TABLET ORAL DAILY
Qty: 90 TABLET | Refills: 3 | Status: SHIPPED | OUTPATIENT
Start: 2023-06-16 | End: 2024-06-28

## 2023-06-16 ASSESSMENT — ENCOUNTER SYMPTOMS
MYALGIAS: 0
EYE PAIN: 0
DECREASED CONCENTRATION: 0
NERVOUS/ANXIOUS: 1
ARTHRALGIAS: 0
PARESTHESIAS: 0
JOINT SWELLING: 0
HEADACHES: 0
FREQUENCY: 0
BREAST MASS: 0
CHILLS: 0
SORE THROAT: 0
CONSTIPATION: 0
FEVER: 0
COUGH: 0
ABDOMINAL PAIN: 0
DYSURIA: 0
HEMATOCHEZIA: 0
DIARRHEA: 0
WEAKNESS: 0
DIZZINESS: 0
SHORTNESS OF BREATH: 0
PALPITATIONS: 0
HEMATURIA: 0
HEARTBURN: 0
NAUSEA: 0

## 2023-06-16 NOTE — PROGRESS NOTES
SUBJECTIVE:   Janette is a 82 year old who presents for Preventive Visit.    Are you in the first 12 months of your Medicare coverage?  No    HPI    Have you ever done Advance Care Planning? (For example, a Health Directive, POLST, or a discussion with a medical provider or your loved ones about your wishes): Yes, patient states has an Advance Care Planning document and will bring a copy to the clinic.     Fall risk  Fallen 2 or more times in the past year?: No  Any fall with injury in the past year?: No    Cognitive Screening   1) Repeat 3 items (Leader, Season, Table)    2) Clock draw: NORMAL  3) 3 item recall: Recalls 3 objects  Results: 3 items recalled: COGNITIVE IMPAIRMENT LESS LIKELY    Mini-CogTM Copyright S Tom. Licensed by the author for use in [facility] [employer]; reprinted with permission (aide@.[facility]). All rights reserved.      Do you have sleep apnea, excessive snoring or daytime drowsiness? : yes    Pruritus:  Has itching all over  Has seen 2 dermatologists, no relief  Topical steroids, shots  Is using hypoallergenic products    40% urea cream +2% salicyclic acid  Topical steroid  Using lotions in a pump bottle    Sleeping well, except for itching    Reviewed and updated as needed this visit by clinical staff   Tobacco  Allergies  Meds              Reviewed and updated as needed this visit by Provider                 Social History     Tobacco Use     Smoking status: Never     Smokeless tobacco: Never   Vaping Use     Vaping status: Never Used     Passive vaping exposure: Yes   Substance Use Topics     Alcohol use: Not Currently     Alcohol/week: 1.7 - 2.5 standard drinks of alcohol     Comment: 3 glasses per week             7/17/2021     5:44 PM   Alcohol Use   Prescreen: >3 drinks/day or >7 drinks/week? No   Do you have a current opioid prescription? No  Do you use any other controlled substances or medications that are not prescribed by a provider? None      Current providers sharing in  care for this patient include:   Patient Care Team:  Alyssa Lopez MD as PCP - General (Internal Medicine)  Mariya Jordan MD as MD (Internal Medicine)  Kuldip Yeh MD as Medical Student (Student in organized health care education/training program)  Kuldip Yeh MD as Medical Student (Student in organized health care education/training program)  Stephon Quan MD as MD (Family Practice)  Meka Chris MD as MD (Internal Medicine)  aRdha Enamorado MD as Medical Student (Student in organized health care education/training program)  Shaan Root MD as MD (Dermapathology)  Alyssa Lopez MD as MD (Internal Medicine)  Nnamdi Bullard MD as MD (Colon and Rectal Surgery)  David Schultz MD as Assigned Musculoskeletal Provider  Alyssa Lopez MD as Assigned PCP  Jazmyn Kaur APRN CNP as Nurse Practitioner    The following health maintenance items are reviewed in Epic and correct as of today:  Health Maintenance   Topic Date Due     COVID-19 Vaccine (1) Never done     ZOSTER IMMUNIZATION (2 of 3) 12/31/2013     DEXA  06/14/2022     FALL RISK ASSESSMENT  06/03/2023     LIPID  06/09/2023     MEDICARE ANNUAL WELLNESS VISIT  06/03/2023     BMP  07/11/2023     INFLUENZA VACCINE (Season Ended) 09/01/2023     MAMMO SCREENING  09/06/2023     ADVANCE CARE PLANNING  06/03/2027     DTAP/TDAP/TD IMMUNIZATION (4 - Td or Tdap) 06/08/2028     PHQ-2 (once per calendar year)  Completed     Pneumococcal Vaccine: 65+ Years  Completed     IPV IMMUNIZATION  Aged Out     MENINGITIS IMMUNIZATION  Aged Out           Mammogram Screening: Recommended mammography every 1-2 years with patient discussion and risk factor consideration  Pertinent mammograms are reviewed under the imaging tab.    Review of Systems   Constitutional: Negative for chills and fever.   HENT: Negative for congestion, ear pain, hearing loss and sore throat.    Eyes: Negative for pain and visual  disturbance.   Respiratory: Negative for cough and shortness of breath.    Cardiovascular: Negative for chest pain, palpitations and peripheral edema.   Gastrointestinal: Negative for abdominal pain, constipation, diarrhea, heartburn, hematochezia and nausea.   Breasts:  Negative for tenderness, breast mass and discharge.   Genitourinary: Negative for dyspareunia, dysuria, frequency, genital sores, hematuria, pelvic pain, urgency, vaginal bleeding and vaginal discharge.   Musculoskeletal: Negative for arthralgias, joint swelling and myalgias.   Skin: Positive for rash.   Neurological: Negative for dizziness, weakness, headaches and paresthesias.   Psychiatric/Behavioral: Negative for decreased concentration and mood changes. The patient is nervous/anxious.          OBJECTIVE:   BP (!) 172/91 (BP Location: Right arm, Patient Position: Sitting, Cuff Size: Adult Regular)   Pulse 52   Ht 1.524 m (5')   Wt 62.3 kg (137 lb 6.4 oz)   LMP  (LMP Unknown)   SpO2 99%   BMI 26.83 kg/m   Estimated body mass index is 26.83 kg/m  as calculated from the following:    Height as of this encounter: 1.524 m (5').    Weight as of this encounter: 62.3 kg (137 lb 6.4 oz).  Physical Exam  GENERAL: healthy, alert and no distress  NECK: no adenopathy, no asymmetry, masses, or scars and thyroid normal to palpation  RESP: lungs clear to auscultation - no rales, rhonchi or wheezes  CV: regular rate and rhythm, normal S1 S2, no S3 or S4, no murmur, click or rub, no peripheral edema and peripheral pulses strong  ABDOMEN: soft, nontender, no hepatosplenomegaly, no masses and bowel sounds normal  MS: no gross musculoskeletal defects noted, no edema  Ears: small, white foreign body noted in each ear.  These were able to be removed with ear wash and curretage  Skin: few excoriations noted on back and under breast.  Skin mildly scaley, but no rashes noted  LN: no cervical or axillary LAD    Diagnostic Test Results:  Labs reviewed in  Epic    ASSESSMENT / PLAN:   Janette was seen today for physical and derm problem.    Diagnoses and all orders for this visit:    Itching  Diffuse pruritus of unclear etiology.  Labs ordered looked for systemic causes of diffuse pruritus and were unremarkable.  Xray ordered looking for LAD was also unremarkable  Rec'd aquaphor or cetaphil in a tub for moisturization  Itching associated with burning sounds somewhat neuropathic, will trial gabapentin at night.  Can increase dose if effective  Could also trial OTC lidocaine  -     Comprehensive metabolic panel (BMP + Alb, Alk Phos, ALT, AST, Total. Bili, TP); Future  -     TSH with free T4 reflex; Future  -     CBC with platelets and differential; Future  -     XR Chest 2 Views; Future  -     gabapentin (NEURONTIN) 300 MG capsule; Take 1 capsule (300 mg) by mouth At Bedtime    Essential hypertension, benign  High suspicion for hyperaldosteronism contributing to hypertension.  Will increase spironolactone from 12.5mg -> 25mg and she will [facility] results [facility]  -     Comprehensive metabolic panel (BMP + Alb, Alk Phos, ALT, AST, Total. Bili, TP); Future  -     spironolactone (ALDACTONE) 25 MG tablet; Take 1 tablet (25 mg) by mouth daily  -     losartan (COZAAR) 100 MG tablet; Take 1 tablet (100 mg) by mouth daily    Hyperlipidemia, unspecified hyperlipidemia type  -     Lipid panel reflex to direct LDL Fasting; Future  -     atorvastatin (LIPITOR) 20 MG tablet; Take 1 tablet (20 mg) by mouth daily    Foreign body in ear, unspecified laterality, initial encounter  Noted to have caps from her hearing aids in both ear canals.  These were able to be removed using ear wash and curette  -     Cancel: Adult ENT  Referral; Future    Osteopenia of multiple sites  Post-menopausal  Vitamin D deficiency  -     DX Hip/Pelvis/Spine; Future  -     Vitamin D Deficiency; Future    Encounter for Medicare annual wellness exam    Edema, unspecified type  -      hydrochlorothiazide (HYDRODIURIL) 25 MG tablet; Take 1 tablet (25 mg) by mouth daily        COUNSELING:  Reviewed preventive health counseling, as reflected in patient instructions      BMI:   Estimated body mass index is 26.83 kg/m  as calculated from the following:    Height as of this encounter: 1.524 m (5').    Weight as of this encounter: 62.3 kg (137 lb 6.4 oz).         She reports that she has never smoked. She has never used smokeless tobacco.      Appropriate preventive services were discussed with this patient, including applicable screening as appropriate for cardiovascular disease, diabetes, osteopenia/osteoporosis, and glaucoma.  As appropriate for age/gender, discussed screening for colorectal cancer, prostate cancer, breast cancer, and cervical cancer. Checklist reviewing preventive services available has been given to the patient.    Reviewed patients plan of care and provided an AVS. The Basic Care Plan (routine screening as documented in Health Maintenance) for Janette meets the Care Plan requirement. This Care Plan has been established and reviewed with the Patient.          Alyssa Lopez MD  [facility] [place]    Identified Health Risks:    I have reviewed Opioid Use Disorder and Substance Use Disorder risk factors and made any needed referrals.

## 2023-06-16 NOTE — PROGRESS NOTES
Medicare Annual Wellness Questionnaire:  This 82 year old year old female presents for a Medicare Wellness Exam.    Patient Care Team       Relationship Specialty Notifications Start End    Alyssa Lopez MD PCP - General Internal Medicine  5/2/16     Phone: [phone] Pager: [phone] Fax: [phone]        [address]  [address] [facility] [address]    Mariya Jordan MD MD Internal Medicine  7/2/15     Phone: [phone] Fax: [phone]         [facility] [address] [facility] [address]    Kuldip Yeh MD Medical Student Student in organized health care education/training program  7/2/15     Phone: [phone] Fax: [phone]         [address] [facility] [address] [facility] [address]    Kuldip Yeh MD Medical Student Student in organized health care education/training program  7/14/15     referring to Derm.    Phone: [phone] Fax: [phone]         [address] [facility] [address] [facility] [address]    Stephon Quan MD MD Family Practice  8/21/15     Phone: [phone] Fax: [phone]         [address], [address] [facility] [address]    Meka Chris MD MD Internal Medicine  10/2/15     Radha Enamorado MD Medical Student Student in organized health care education/training program  10/7/15     Phone: [phone] Fax: [phone]         [address] [facility] [address] [facility] [address]    Shaan Root MD MD Dermapathology  10/15/15     Phone: [phone] Fax: [phone]         [address] [facility] [address] [facility] [address]    Alyssa Lopez MD MD Internal Medicine  4/22/16     Phone: [phone] Pager: [phone] Fax: [phone]        [address]  [address] [facility] [address]    Nnamdi Bullard MD MD Colon and Rectal Surgery  5/2/16     Phone: [phone] Fax: [phone]         [address] [place] [address]    David Schultz MD Assigned Musculoskeletal Provider   12/13/20     Phone:  [phone] Fax: [phone] [address] [facility] [address]    Alyssa Lopez MD Assigned PCP   6/11/22     Phone: [phone] Pager: [phone] Fax: [phone]        [address] [facility] [address] [facility] [address]      Fall Risk Assessment:  Have you fallen 2 or more times in the last year?  No    How many times were you injured due to a fall in the last year?  None    PHQ-2:  Over the last 2 weeks, how often have you been bothered by feeling down, depressed, or hopeless?  Not at all (0)     Over the last 2 weeks, how often have you had little interest or pleasure in doing things?  Not at all (0)    Social History:  What is your marital status?      Who lives in your household?  Spouse    Does your home have loose rugs in the hallway:     No    Does your home have grab bars in the bathroom:    Yes     Does your home have handrails on the stairs?  Yes     Does your home have poorly lit areas?    No    Do you feel threatened or controlled by a partner, ex-partner or anyone in your life?   No    Has anyone hurt you physically, for example by pushing, hitting, slapping or kicking you or forcing you to have sex?   No    Do you need help with the phone, transportation, shopping, preparing meals, housework, laundry, medications or managing money?   No    Sexual Health:  Are you sexually active?    Yes     If yes, with men, women, or both?     Men    If yes, how many partners?  1 only with my     If yes, are you using condoms?    No    Have you had any sexually transmitted infections in the last year?   No    Do you have any sexual concerns?    No    Women Only:  Women: What year did you stop having periods (approximate age)?  30+ years ago    Women: Any vaginal bleeding in the last year?    No    Women: Have you ever had an abnormal Pap smear?    No    General Health Assessment:  Have you noticed any hearing difficulties?   No    Do you wear hearing aids?   Yes      Have you seen a hearing professional such as an audiologist in the last 1 year?   No    Do you have vision difficulty?    Yes     Do you wear glasses or contacts?   Yes     Have you seen an eye doctor in the last 1 year?   Yes     How many servings of fruits and vegetables do you eat a day?  Fruit: 2  Vegetables: 1    How often do you exercise in a week?  5    How long and what kind of exercise do you do?  Walking    Tobacco and Alcohol History:  Do you use tobacco/nicotine products?    No    If yes, please list the method of use and average weekly consumption?  N/A    Do you use any other drugs?   No         Do you drink alcohol?   Yes     If you drink alcohol, how many drinks per week?  5    Advance Directive:  Have you completed an Advance Directives document?  Yes     If yes, have you given a copy to the clinic?   No    Do you need information on Advance Directives?   No

## 2023-06-16 NOTE — PATIENT INSTRUCTIONS
To schedule your dexa scan with imaging, please call [phone]    Patient Education   Personalized Prevention Plan  You are due for the preventive services outlined below.  Your care team is available to assist you in scheduling these services.  If you have already completed any of these items, please share that information with your care team to update in your medical record.  Health Maintenance Due   Topic Date Due    COVID-19 Vaccine (1) Never done    Zoster (Shingles) Vaccine (2 of 3) 12/31/2013    Osteoporosis Screening  06/14/2022    Cholesterol Lab  06/09/2023    Basic Metabolic Panel  07/11/2023

## 2023-06-16 NOTE — NURSING NOTE
Janette Anderson received a bilateral ear wash today in clinic at the request of Dr. Lopez. Pt's hearing aid caps were stuck in the ear canal bilaterally. Using a curette and solution of water and hydrogen peroxide, both hearing aid caps were removed from the pt's ears. Dr. Lopez assisted in the removal and visualized both ears afterwards. Pt tolerated the removal well, no redness or irritation was observed.    Dayana Holguin, EMT at 1:43 PM on 6/16/2023

## 2023-06-23 ENCOUNTER — ANCILLARY PROCEDURE (OUTPATIENT)
Dept: BONE DENSITY | Facility: CLINIC | Age: 83
End: 2023-06-23
Attending: INTERNAL MEDICINE
Payer: COMMERCIAL

## 2023-06-23 DIAGNOSIS — Z78.0 POST-MENOPAUSAL: ICD-10-CM

## 2023-06-23 DIAGNOSIS — M85.89 OSTEOPENIA OF MULTIPLE SITES: ICD-10-CM

## 2023-06-23 PROCEDURE — 77081 DXA BONE DENSITY APPENDICULR: CPT | Mod: XU | Performed by: RADIOLOGY

## 2023-06-23 PROCEDURE — 77080 DXA BONE DENSITY AXIAL: CPT | Performed by: RADIOLOGY

## 2023-07-14 ENCOUNTER — VIRTUAL VISIT (OUTPATIENT)
Dept: PHARMACY | Facility: CLINIC | Age: 83
End: 2023-07-14
Attending: INTERNAL MEDICINE
Payer: COMMERCIAL

## 2023-07-14 DIAGNOSIS — Z78.9 TAKES DIETARY SUPPLEMENTS: ICD-10-CM

## 2023-07-14 DIAGNOSIS — E78.5 HYPERLIPIDEMIA, UNSPECIFIED HYPERLIPIDEMIA TYPE: ICD-10-CM

## 2023-07-14 DIAGNOSIS — I10 ESSENTIAL HYPERTENSION, BENIGN: Primary | ICD-10-CM

## 2023-07-14 DIAGNOSIS — I10 ESSENTIAL HYPERTENSION, BENIGN: ICD-10-CM

## 2023-07-14 DIAGNOSIS — R52 PAIN: ICD-10-CM

## 2023-07-14 PROCEDURE — 99207 PR NO CHARGE LOS: CPT | Performed by: PHARMACIST

## 2023-07-14 NOTE — PATIENT INSTRUCTIONS
"Recommendations from today's MTM visit:                                                    MTM (medication therapy management) is a service provided by a clinical pharmacist designed to help you get the most of out of your medicines.   Today we reviewed what your medicines are for, how to know if they are working, that your medicines are safe and how to make your medicine regimen as easy as possible.      1. Consider stopping ibuprofen and trying more Tylenol.  2. Okay to stop vitamin D if you would like    Follow-up: Return in about 5 weeks (around 8/18/2023) for Follow up, with me, using a phone visit.    It was great speaking with you today.  I value your experience and would be very thankful for your time in providing feedback in our clinic survey. In the next few days, you may receive an email or text message from [employer] with a link to a survey related to your  clinical pharmacist.\"     To schedule another MTM appointment, please call the clinic directly or you may call the MTM scheduling line at [phone] or toll-free at [phone].     My Clinical Pharmacist's contact information:                                                      Please feel free to contact me with any questions or concerns you have.      Giuliano Solorzano, Pharm. D., BCACP  Medication Therapy Management Pharmacist     "

## 2023-07-14 NOTE — PROGRESS NOTES
"Medication Therapy Management (MTM) Encounter    ASSESSMENT:                            Medication Adherence/Access: No issues identified    Hypertension: Patient is meeting blood pressure goal of <140/90 mmHg. Patient does have some blood pressure above goal and will reduce ibuprofen use to help this. Will consider decreasing blood pressure medication if hypotension episodes resume.     Hyperlipidemia: Patient is on moderate intensity statin which is indicated based on 2019 ACC/AHA guidelines for lipid management.      Pain:    Patient should try using Tylenol more often to help reduce ibuprofen use because of hypertension     Supplements:   Discussed with patient that vitamin D is in range and she could stop if she wants to.     PLAN:                            1. Consider stopping ibuprofen and trying more Tylenol.  2. Okay to stop vitamin D if you would like    Follow-up: Return in about 5 weeks (around 8/18/2023) for Follow up, with me, using a phone visit.    SUBJECTIVE/OBJECTIVE:                          Janette Anderson is a 82 year old female called for an initial visit. She was referred to me from Alyssa Lopez MD.      Reason for visit: hypertension management.    Allergies/ADRs: Reviewed in chart  Past Medical History: Reviewed in chart  Tobacco: She reports that she has never smoked. She has never used smokeless tobacco.  Alcohol: 7-9 beverages / week      Medication Adherence/Access: no issues reported    Hypertension:    Hydrochlorothiazide 25 mg daily  Losartan 100 mg daily  Spironolactone 12.5 mg daily    Patient self-monitors blood pressure. Patient reports no current medication side effects.    Reports she is under \"extreme pressure\" and is being sued by her late husbands daughter.     Reports she has an issue with her blood pressure being high and then dropping down.    On 6/20 it was 162/96 mmHg.   6/21 148/84  - 10 mins later was 101/80 mmHg.   6/22 - 84/69 mmHg - later that afternoon " was 112/65 mmHg - that evening was 152/78 mmHg.     She got dizzy from her low blood pressure  On 7/7 she felt weird and it was 84/62 mmHg. Later that day it was 131/71 mmHg.       Date BP   7/12 101/85   7/10 127/73   7/9 114/86       BP Readings from Last 3 Encounters:   06/16/23 (!) 172/91   05/18/23 (!) 152/77   06/17/22 (!) 160/75     Pulse Readings from Last 3 Encounters:   06/16/23 52   05/18/23 59   06/17/22 50     Hyperlipidemia:   atorvastatin 20 mg daily  Patient reports no significant myalgias or other side effects.  The ASCVD Risk score (Martin DK, et al., 2019) failed to calculate for the following reasons:    The 2019 ASCVD risk score is only valid for ages 40 to 79  Recent Labs   Lab Test 06/16/23  1122 06/09/22  1017 05/02/16  1203 10/09/15  1113   CHOL 193 195   < > 293*   HDL 77 61   < > 77   * 108*   < > 197*   TRIG 58 129   < > 92   CHOLHDLRATIO  --   --   --  3.8    < > = values in this interval not displayed.     Pain:    Ibuprofen 200 mg as needed - takes 2 tablets twice a day     Reports when she stops taking the ibuprofen she feels achy. Reports that Tylenol does not touch her pain.     Supplements:   Vitamin D 1000 units daily - wondering if she needs this.     Vitamin D Deficiency Screening Results:  Lab Results   Component Value Date    VITDT 44 06/16/2023    VITDT 30 06/08/2018    VITDT 40 05/25/2017    VITDT 91 (H) 05/02/2016         Today's Vitals: LMP  (LMP Unknown)   ----------------      I spent 21 minutes with this patient today. All changes were made via collaborative practice agreement with Alyssa Lopez MD. A copy of the visit note was provided to the patient's provider(s).    A summary of these recommendations was sent via [employer].    Giuliano Solorzano, Pharm. D., [facility]  Medication Therapy Management Pharmacist    Telemedicine Visit Details  Type of service:  Telephone visit  Start Time: 8:29 am  End Time: 8:50 am     Medication Therapy Recommendations  Essential  hypertension, benign    Current Medication: ibuprofen (ADVIL/MOTRIN) 200 MG capsule   Rationale: Undesirable effect - Adverse medication event - Safety   Recommendation: Decrease Frequency   Status: Patient Agreed - Adherence/Education

## 2023-07-19 ENCOUNTER — TELEPHONE (OUTPATIENT)
Dept: INTERNAL MEDICINE | Facility: CLINIC | Age: 83
End: 2023-07-19
Payer: COMMERCIAL

## 2023-07-19 RX ORDER — LOSARTAN POTASSIUM 100 MG/1
TABLET ORAL
Qty: 90 TABLET | Refills: 3 | OUTPATIENT
Start: 2023-07-19

## 2023-07-20 NOTE — TELEPHONE ENCOUNTER
RN called [facility] and spoke to staff.  They checked the patient's profile and relayed there are 4 refills remaining for the Losartan.  They will get a refill ready for patient to  and will notify the patient.    Sejal Gibson RN on 7/20/2023 at 9:24 AM

## 2023-08-10 ENCOUNTER — TELEPHONE (OUTPATIENT)
Dept: INTERNAL MEDICINE | Facility: CLINIC | Age: 83
End: 2023-08-10
Payer: COMMERCIAL

## 2023-08-11 NOTE — CONFIDENTIAL NOTE
If it is not helping, then ok to stop.  I know she has seen 2 dermatologists already for this, but could refer her to derm here if symptoms remain severe.

## 2023-08-11 NOTE — TELEPHONE ENCOUNTER
[facility] message sent to patient regarding message from Dr. Lopez.     JACIEL WALSH RN on 8/11/2023 at 4:45 PM    
[facility] Call Center    Phone Message    May a detailed message be left on voicemail: yes     Reason for Call: Medication Question or concern regarding medication   Prescription Clarification  Name of Medication: Gabapentin 300mg   Prescribing Provider: Dr. Lopez    Pharmacy:  [id number] - [place], [place] - [address] AT [facility] [address]    What on the order needs clarification? Patient calling stating that after a month of taking this she is still suffering with severe itching.       Action Taken: Message routed to:  [facility]    Travel Screening: Not Applicable                                                                   
Patient/EMS

## 2023-08-14 ENCOUNTER — TELEPHONE (OUTPATIENT)
Dept: INTERNAL MEDICINE | Facility: CLINIC | Age: 83
End: 2023-08-14
Payer: COMMERCIAL

## 2023-08-14 NOTE — TELEPHONE ENCOUNTER
[facility] Call Center    Phone Message    May a detailed message be left on voicemail: yes     Reason for Call: Other: Patient called and needs a New Opportunity Form per [facility] in [place], [place] phone # [phone].  Please call if questions or if you need the forms.  This is far a hearing evaluation/hearing hearing aids.       Action Taken: Message routed to:  [facility]    Travel Screening: Not Applicable

## 2023-08-15 RX ORDER — GABAPENTIN 300 MG/1
300 CAPSULE ORAL 3 TIMES DAILY
Qty: 90 CAPSULE | Refills: 1 | Status: CANCELLED | OUTPATIENT
Start: 2023-08-15

## 2023-08-15 NOTE — TELEPHONE ENCOUNTER
I called company where patient is getting her hearing tested today 8/15 and patient was supposed to call her insurance company. Clinic said nothing is needed from her primary care clinic.

## 2023-08-17 ENCOUNTER — OFFICE VISIT (OUTPATIENT)
Dept: DERMATOLOGY | Facility: CLINIC | Age: 83
End: 2023-08-17
Payer: COMMERCIAL

## 2023-08-17 ENCOUNTER — LAB (OUTPATIENT)
Dept: LAB | Facility: CLINIC | Age: 83
End: 2023-08-17
Payer: COMMERCIAL

## 2023-08-17 DIAGNOSIS — L20.89 OTHER ATOPIC DERMATITIS: ICD-10-CM

## 2023-08-17 DIAGNOSIS — L29.9 PRURITUS, UNSPECIFIED: ICD-10-CM

## 2023-08-17 DIAGNOSIS — L50.1 CHRONIC IDIOPATHIC URTICARIA: ICD-10-CM

## 2023-08-17 DIAGNOSIS — L50.1 CHRONIC IDIOPATHIC URTICARIA: Primary | ICD-10-CM

## 2023-08-17 LAB — TSH SERPL DL<=0.005 MIU/L-ACNC: 2.13 UIU/ML (ref 0.3–4.2)

## 2023-08-17 PROCEDURE — 83516 IMMUNOASSAY NONANTIBODY: CPT | Mod: 90 | Performed by: PATHOLOGY

## 2023-08-17 PROCEDURE — 36415 COLL VENOUS BLD VENIPUNCTURE: CPT | Performed by: PATHOLOGY

## 2023-08-17 PROCEDURE — 99204 OFFICE O/P NEW MOD 45 MIN: CPT | Performed by: STUDENT IN AN ORGANIZED HEALTH CARE EDUCATION/TRAINING PROGRAM

## 2023-08-17 PROCEDURE — 88184 FLOWCYTOMETRY/ TC 1 MARKER: CPT | Mod: 90 | Performed by: PATHOLOGY

## 2023-08-17 PROCEDURE — 88185 FLOWCYTOMETRY/TC ADD-ON: CPT | Mod: 90 | Performed by: PATHOLOGY

## 2023-08-17 PROCEDURE — 86376 MICROSOMAL ANTIBODY EACH: CPT | Performed by: STUDENT IN AN ORGANIZED HEALTH CARE EDUCATION/TRAINING PROGRAM

## 2023-08-17 PROCEDURE — 99000 SPECIMEN HANDLING OFFICE-LAB: CPT | Performed by: PATHOLOGY

## 2023-08-17 PROCEDURE — 84443 ASSAY THYROID STIM HORMONE: CPT | Performed by: PATHOLOGY

## 2023-08-17 RX ORDER — GABAPENTIN 300 MG/1
300 CAPSULE ORAL 3 TIMES DAILY
COMMUNITY
End: 2023-09-05

## 2023-08-17 RX ORDER — BETAMETHASONE DIPROPIONATE 0.5 MG/G
LOTION TOPICAL
COMMUNITY
Start: 2023-07-20 | End: 2023-11-28

## 2023-08-17 RX ORDER — FEXOFENADINE HCL 180 MG/1
180 TABLET ORAL DAILY
Qty: 120 TABLET | Refills: 3 | Status: SHIPPED | OUTPATIENT
Start: 2023-08-17 | End: 2023-10-17

## 2023-08-17 ASSESSMENT — PAIN SCALES - GENERAL: PAINLEVEL: SEVERE PAIN (6)

## 2023-08-17 NOTE — LETTER
"8/17/2023       RE: Janette Anderson  [address]  [place] [address]     Dear Colleague,    Thank you for referring your patient, Janette Anderson, to the [facility] DERMATOLOGY [facility] [place] at [facility]. Please see a copy of my visit note below.    [facility] Dermatology Note  Encounter Date: Aug 17, 2023  Office Visit     Dermatology Problem List:  1. Chronic urticaria  - Start fexofenadine 180 mg, up to 4 tablets/day    ____________________________________________    Assessment & Plan:    # Chronic urticaria, dermatographism   - Labs for further evaluation; anti-TPO antibodies, TSH, urticaria induced basophil activation and anti-IgE antibody  - Start fexofenadine 180 mg, up to 4 tablets per day  - Stop gabapentin    Procedures Performed:   None    Follow-up: in October     Staff and Medical Student:     Arielle Marie, MS3, [place] and staffed with Dr. Hernandez      ____________________________________________    CC: No chief complaint on file.    HPI:  Ms. Janette Anderson is a(n) 82 year old female who presents today as a return patient for itching and burning.    Janette presents today for itching and burning localized to abdomen, upper back, waist and chest. The sensation migrates, and is especially noticeable where clothing presses on her, like the waist of her pants and bra. This started in February. On 300 mg of Gabapentin 3 times a day on Sunday, prior ot that she was just taking it at night for 3 weeks. Feels that is helping a little. She has also tried antihistamines which do not help much. Tried light therapy for a month, seemed to help a little during.    There is also a rash on her upper back that was diagnosed as a dermatitis, will sometimes produce \"pimples.\" Applies Vanicream to her back. She has also noticed a flat purple patch that showed up on her left arm, and a flat purple patch on her right lower leg.     Her " second  passed 3 years ago and is in litigation with his daughter which has caused significant stress. Notices that stress seems to exacerbate symptoms.      Resides in [place] October- May.    Patient is otherwise feeling well, without additional skin concerns.    Labs:  N/A    Physical Exam:  Vitals: LMP  (LMP Unknown)   SKIN: Total skin excluding the undergarment areas was performed. The exam included the head/face, neck, both arms, chest, back, abdomen, both legs, digits and/or nails.   - + dermatographism test   - Scattered posttraumatic ecchymosis on bilateral shins  - No other lesions of concern on areas examined.     Medications:  Current Outpatient Medications   Medication    atorvastatin (LIPITOR) 20 MG tablet    cholecalciferol 1000 units TABS    hydrochlorothiazide (HYDRODIURIL) 25 MG tablet    ibuprofen (ADVIL/MOTRIN) 200 MG capsule    losartan (COZAAR) 100 MG tablet    spironolactone (ALDACTONE) 25 MG tablet     Current Facility-Administered Medications   Medication    4 mL ropivacaine (NAROPIN) injection 5 mg/mL    betamethasone acet & sod phos (CELESTONE) injection 6 mg    lidocaine 1 % injection 1 mL      Past Medical History:   Patient Active Problem List   Diagnosis    Essential hypertension, benign    HLD (hyperlipidemia)    Inflamed seborrheic keratosis    Osteopenia     Past Medical History:   Diagnosis Date    HLD (hyperlipidaemia)     HTN (hypertension)     Osteoporosis     Primary osteoarthritis of right knee         CC Referred Self, MD  No address on file on close of this encounter.     Dermatology Rooming Note    Janette Anderson's goals for this visit include:   Chief Complaint   Patient presents with    Derm Problem     Pt has concern about rash on back. And a separate concern about burning and pruritus but pt states is unrelated.     Alexander Carrillo, EMT-B

## 2023-08-17 NOTE — PATIENT INSTRUCTIONS
Your rash and itching is consistent with dermatographism and chronic idiopathic urticaria     Check labs

## 2023-08-17 NOTE — PROGRESS NOTES
Dermatology Rooming Note    Janette Anderson's goals for this visit include:   Chief Complaint   Patient presents with    Derm Problem     Pt has concern about rash on back. And a separate concern about burning and pruritus but pt states is unrelated.     Alexander Carrillo, EMT-B

## 2023-08-17 NOTE — PROGRESS NOTES
I have personally examined this patient and agree with the medical student's documentation and plan of care. I have reviewed and amended the medical student's note as necessary.The documentation accurately reflects my clinical observations, diagnoses, treatment and follow-up plans.     Fam Hernandez MD  Dermatology Staff      [facility] Dermatology Note  Encounter Date: Aug 17, 2023  Office Visit     Dermatology Problem List:  1. Chronic urticaria  - Start fexofenadine 180 mg, up to 4 tablets/day    ____________________________________________    Assessment & Plan:    # Chronic urticaria, dermatographism   - Labs for further evaluation; anti-TPO antibodies, TSH, urticaria induced basophil activation and anti-IgE antibody  - Start fexofenadine 180 mg, up to 4 tablets per day  - Stop gabapentin    #atopic dermatitis   - diffuse itching often associated w/ immune senescence related to aging and natural TH2 shift  - will explore dupixent as treatment option       Procedures Performed:   None    Follow-up: in October     Staff and Medical Student:     Arielle Marie, MS3, seem and staffed with Dr. Hernandez      ____________________________________________    CC: No chief complaint on file.    HPI:  Ms. Janette Anderson is a(n) 82 year old female who presents today as a return patient for itching and burning.    Janette presents today for itching and burning localized to abdomen, upper back, waist and chest. The sensation migrates, and is especially noticeable where clothing presses on her, like the waist of her pants and bra. This started in February. On 300 mg of Gabapentin 3 times a day on Sunday, prior ot that she was just taking it at night for 3 weeks. Feels that is helping a little. She has also tried antihistamines which do not help much. Tried light therapy for a month, seemed to help a little during.    There is also a rash on her upper back that was diagnosed as a dermatitis, will sometimes  "produce \"pimples.\" Applies Vanicream to her back. She has also noticed a flat purple patch that showed up on her left arm, and a flat purple patch on her right lower leg.     Her second  passed 3 years ago and is in litigation with his daughter which has caused significant stress. Notices that stress seems to exacerbate symptoms.      Resides in [place] October- May.    Patient is otherwise feeling well, without additional skin concerns.    Labs:  N/A    Physical Exam:  Vitals: LMP  (LMP Unknown)   SKIN: Total skin excluding the undergarment areas was performed. The exam included the head/face, neck, both arms, chest, back, abdomen, both legs, digits and/or nails.   - + dermatographism test   - Scattered posttraumatic ecchymosis on bilateral shins  - No other lesions of concern on areas examined.     Medications:  Current Outpatient Medications   Medication    atorvastatin (LIPITOR) 20 MG tablet    cholecalciferol 1000 units TABS    hydrochlorothiazide (HYDRODIURIL) 25 MG tablet    ibuprofen (ADVIL/MOTRIN) 200 MG capsule    losartan (COZAAR) 100 MG tablet    spironolactone (ALDACTONE) 25 MG tablet     Current Facility-Administered Medications   Medication    4 mL ropivacaine (NAROPIN) injection 5 mg/mL    betamethasone acet & sod phos (CELESTONE) injection 6 mg    lidocaine 1 % injection 1 mL      Past Medical History:   Patient Active Problem List   Diagnosis    Essential hypertension, benign    HLD (hyperlipidemia)    Inflamed seborrheic keratosis    Osteopenia     Past Medical History:   Diagnosis Date    HLD (hyperlipidaemia)     HTN (hypertension)     Osteoporosis     Primary osteoarthritis of right knee         CC Referred Self, MD  No address on file on close of this encounter.   "

## 2023-08-18 ENCOUNTER — VIRTUAL VISIT (OUTPATIENT)
Dept: PHARMACY | Facility: CLINIC | Age: 83
End: 2023-08-18
Payer: COMMERCIAL

## 2023-08-18 DIAGNOSIS — R52 PAIN: ICD-10-CM

## 2023-08-18 DIAGNOSIS — I10 BENIGN ESSENTIAL HYPERTENSION: Primary | ICD-10-CM

## 2023-08-18 DIAGNOSIS — Z78.9 TAKES DIETARY SUPPLEMENTS: ICD-10-CM

## 2023-08-18 DIAGNOSIS — L50.1 CHRONIC IDIOPATHIC URTICARIA: ICD-10-CM

## 2023-08-18 LAB — THYROPEROXIDASE AB SERPL-ACNC: <10 IU/ML

## 2023-08-18 PROCEDURE — 99207 PR NO CHARGE LOS: CPT | Performed by: PHARMACIST

## 2023-08-18 NOTE — PATIENT INSTRUCTIONS
"Recommendations from today's MTM visit:                                                         Continue all medications as prescribed  Please reach out if you have any questions or concerns.     Follow-up: Follow up with an MTM pharmacist as needed    Follow-up: Return if symptoms worsen or fail to improve.    It was great speaking with you today.  I value your experience and would be very thankful for your time in providing feedback in our clinic survey. In the next few days, you may receive an email or text message from "[employer] with a link to a survey related to your  clinical pharmacist.\"     To schedule another MTM appointment, please call the clinic directly or you may call the MTM scheduling line at [phone] or toll-free at [phone].     My Clinical Pharmacist's contact information:                                                      Please feel free to contact me with any questions or concerns you have.      Giuliano Solorzano, Pharm. D., [facility]  Medication Therapy Management Pharmacist     "

## 2023-08-18 NOTE — PROGRESS NOTES
Medication Therapy Management (MTM) Encounter    ASSESSMENT:                            Medication Adherence/Access: No issues identified    Hypertension: Patient is meeting blood pressure goal of < 140/90mmHg.    Chronic Itching: Needs improvement - plan in place with dermatology. Discussed with patient that high doses of antihistamines can cause anticholinergic side effects.     Pain: Stable - has improved with gabapentin.     Supplements: Stable.     PLAN:                            Continue all medications as prescribed  Please reach out if you have any questions or concerns.     Follow-up: Follow up with an MTM pharmacist as needed    SUBJECTIVE/OBJECTIVE:                          Janette Anderson is a 82 year old female called for a follow-up visit from 7/14/23.       Reason for visit: medication follow up    Allergies/ADRs: Reviewed in chart  Past Medical History: Reviewed in chart  Tobacco: She reports that she has never smoked. She has never used smokeless tobacco.  Alcohol: 7-9 beverages / week    Medication Adherence/Access: no issues reported    Hypertension:   Hydrochlorothiazide 25 mg daily  Losartan 100 mg daily  Spironolactone 12.5 mg daily    Patient reports no current medication side effects. She has started taking all of her antihypertensive medications in the morning and reports no side effects with this change.   Patient self-monitors blood pressure.  Home BP monitoring:      Date Blood Pressure Reading (mmHg)   8/8/23 122/78 (measured in the evening)   8/9/23 153/84   Switched to taking all medications in the morning    8/10/23 133/83 (morning)   8/11/23 127/78 (morning)   8/13/23 122/87 (afternoon)   8/14/23 126/89   8/15/23 136/87 (morning)   8/16/23 136/91   8/17/23 134/75   8/18/23 136/78     BP Readings from Last 3 Encounters:   06/16/23 (!) 172/91   05/18/23 (!) 152/77   06/17/22 (!) 160/75     Pulse Readings from Last 3 Encounters:   06/16/23 52   05/18/23 59   06/17/22 50     Chronic  "Itching:   Fexofenadine 180 mg (up to 4 tablets daily) - started at the higher dose yesterday and reported dry mouth as a side effect    Patient reports that she has had chronic itching that started in February 2023. She is unsure of the cause, but feels comfortable with the plan put in place by dermatology.     Pain:  Ibuprofen 200 mg prn (reports using only occasionally for shoulder pain - has decreased use from 2 tablets BID)  Gabapentin 300 mg TID (reports only taking 1-2 times daily typically)    At last visit, it was recommended to decrease ibuprofen use and switch to acetaminophen. Patient reports that acetaminophen is not effective. Patient reports that gabapentin has been helpful with pain, but it does cause her to feel \"loopy.\"    Supplements:   At last visit, discussed discontinuing vitamin D 1000 units daily due to blood levels being within normal range. She reports that she has stopped taking it.     Today's Vitals: LMP  (LMP Unknown)   ----------------    I spent 24 minutes with this patient today. All changes were made via collaborative practice agreement with Alyssa Lopez MD. A copy of the visit note was provided to the patient's provider(s).    A summary of these recommendations was sent via [employer].    Mora Denise, 4th Year Student Pharmacist     Preceptor Cosignature: I have reviewed and verified the student's documentation.    Giuliano Solorzano, Pharm. D., [facility]  Medication Therapy Management Pharmacist  Direct Voicemail: [phone]      Telemedicine Visit Details  Type of service:  Telephone visit  Start Time:  8:30  End Time: 8:54 AM     Medication Therapy Recommendations  No medication therapy recommendations to display     "

## 2023-08-23 LAB — URTICARIA INDUCED BASOPHIL ACTIVATION: 26 %

## 2023-08-24 ENCOUNTER — MYC MEDICAL ADVICE (OUTPATIENT)
Dept: DERMATOLOGY | Facility: CLINIC | Age: 83
End: 2023-08-24
Payer: COMMERCIAL

## 2023-08-24 ENCOUNTER — TELEPHONE (OUTPATIENT)
Dept: DERMATOLOGY | Facility: CLINIC | Age: 83
End: 2023-08-24

## 2023-08-24 DIAGNOSIS — L50.1 CHRONIC IDIOPATHIC URTICARIA: Primary | ICD-10-CM

## 2023-08-24 LAB — ANTI IGE ANTIBODY: NORMAL

## 2023-08-24 NOTE — TELEPHONE ENCOUNTER
[facility] Call Center    Phone Message    May a detailed message be left on voicemail: yes     Reason for Call: Medication Question or concern regarding medication   Prescription Clarification  Name of Medication: fexofenadine (ALLEGRA) 180 MG tablet   Prescribing Provider: Fam Hernandez MD   Question/Problem? This kind patient state that her last visit she was prescribed fexofenadine (ALLEGRA) 180 MG tablet and informed to stop taking gabapentin (NEURONTIN) 300 MG capsule. Unfortunately, even after taking 3 tablets a day of fexofenadine, it is not working. She has stayed on gabapentin as it gives her some relief. She would like to discuss if there is something else she should take or do. Thank you!    Pharmacy: [id number] [address] AT [facility] [place] OF  & [address]     Action Taken: Message routed to:  [facility]M [facility]    Travel Screening: Not Applicable

## 2023-08-25 ENCOUNTER — TELEPHONE (OUTPATIENT)
Dept: DERMATOLOGY | Facility: CLINIC | Age: 83
End: 2023-08-25
Payer: COMMERCIAL

## 2023-08-25 NOTE — TELEPHONE ENCOUNTER
PA Initiation    Medication: DUPIXENT 300 MG/2ML SC SOPN  Insurance Company: [employer] ([facility]) - Phone [phone] Fax [phone]  Pharmacy Filling the Rx: [facility] - [place], [place] - [address]  Filling Pharmacy Phone:    Filling Pharmacy Fax:    Start Date: 8/25/2023    Key: [id number]

## 2023-08-28 DIAGNOSIS — L50.1 CHRONIC IDIOPATHIC URTICARIA: ICD-10-CM

## 2023-08-28 DIAGNOSIS — L20.89 OTHER ATOPIC DERMATITIS: Primary | ICD-10-CM

## 2023-08-29 NOTE — TELEPHONE ENCOUNTER
PRIOR AUTHORIZATION DENIED    Medication: DUPIXENT 300 MG/2ML SC SOPN  Insurance Company: [employer] ([facility]) - Phone [phone] Fax [phone]  Denial Date: 8/25/2023  Denial Rational: not covered diagnosis  Appeal Information: [phone]  Patient Notified:

## 2023-08-30 ENCOUNTER — TELEPHONE (OUTPATIENT)
Dept: DERMATOLOGY | Facility: CLINIC | Age: 83
End: 2023-08-30
Payer: COMMERCIAL

## 2023-08-30 NOTE — TELEPHONE ENCOUNTER
[facility] Call Center    Phone Message    May a detailed message be left on voicemail: yes     Reason for Call: Medication Question or concern regarding medication   Prescription Clarification  Name of Medication: Dupixent  Prescribing Provider: Fam Hernandez MD    Pharmacy:   [facility] - [place], [place] - [address]      What on the order needs clarification? Pt states she called the pharmacy and was told the medication was denied. Please call Pt back to discuss if it needs a prior authorization or other steps to get the medication. Thank you.       Action Taken: Message routed to:  [facility] Derm    Travel Screening: Not Applicable

## 2023-08-30 NOTE — TELEPHONE ENCOUNTER
[facility] Call Center    Phone Message    May a detailed message be left on voicemail: yes     Reason for Call: Other: Janette would like to know if she needs to schedule a visit to learn how to do the Dupixent injections.      Please call Janette back at [phone] to discuss. Thank you.     Action Taken: Message routed to:  [facility] Derm    Travel Screening: Not Applicable

## 2023-08-30 NOTE — TELEPHONE ENCOUNTER
Called and spoke with pt. Informed her that the PA appeal was still pending and we will notify her of the outcome. No other questions.  Angela Alex RN

## 2023-08-30 NOTE — TELEPHONE ENCOUNTER
Called and spoke with pt. Advised that she will need her injection for training (which she doesn't have yet). She will call when she has injections and schedule (would prefer to do in clinic training rather than watching training video online).  Angela Alex RN

## 2023-09-05 ENCOUNTER — TELEPHONE (OUTPATIENT)
Dept: INTERNAL MEDICINE | Facility: CLINIC | Age: 83
End: 2023-09-05
Payer: COMMERCIAL

## 2023-09-05 DIAGNOSIS — L29.9 ITCHING: Primary | ICD-10-CM

## 2023-09-05 RX ORDER — GABAPENTIN 300 MG/1
300 CAPSULE ORAL 3 TIMES DAILY
Qty: 270 CAPSULE | Refills: 0 | Status: SHIPPED | OUTPATIENT
Start: 2023-09-05 | End: 2023-12-01

## 2023-09-05 NOTE — TELEPHONE ENCOUNTER
[facility] Call Center    Phone Message    May a detailed message be left on voicemail: yes     Reason for Call: Medication Question or concern regarding medication   Prescription Clarification  Name of Medication:     gabapentin (NEURONTIN) 300 MG capsule     Prescribing Provider: Dr. Lopez    Pharmacy:    [facility] #[id number] - [place], [place] - [address] AT [facility] OF  & MAIN     What on the order needs clarification? Patient states she needs a new script because she is almost out due to the change in frequency recently.       Action Taken: Message routed to:  [facility] pcc    Travel Screening: Not Applicable

## 2023-09-06 NOTE — TELEPHONE ENCOUNTER
Per Pt the appeal is missing clinical information. Please fax to the urgent appeal department at [phone] and call patient with an update. Okay to leave a voicemail.

## 2023-09-06 NOTE — TELEPHONE ENCOUNTER
Peggy,     Can you clarify the current state of this appeal. I received a message with a request for advisement in response to your message saying ok to move forward with appeal.     I see there was denial 8 days ago rejecting dupixent because it was associated with urticaria instead of atopic dermatitis. I corrected that erroneous association on 08/24 and your appeal letter 08/29 mentioned atopic dermatitis not urticaria. Is there anything you need me to due for this appeal currently?    EM

## 2023-09-07 ENCOUNTER — TELEPHONE (OUTPATIENT)
Dept: ORTHOPEDICS | Facility: CLINIC | Age: 83
End: 2023-09-07
Payer: COMMERCIAL

## 2023-09-07 NOTE — TELEPHONE ENCOUNTER
Medication Appeal Initiation    We have initiated an appeal for the requested medication:  Medication: DUPIXENT 300 MG/2ML SC SOPN  Appeal Start Date:  9/7/2023  Insurance Company: [employer] Phone: [phone]  Insurance Fax: [phone]  Comments:  Faxed [phone]

## 2023-09-07 NOTE — TELEPHONE ENCOUNTER
[facility] Call Center    Phone Message    May a detailed message be left on voicemail: yes     Reason for Call: Other: Patient is calling to schedule an injection. I was unable to pull up a schedule anywhere for Dr. Schultz. I tried asking lead, Rupal and clinic staff, Jordyn however they didn't know either. Please contact patient back to schedule. Thanks.     Action Taken: Other: [facility]    Travel Screening: Not Applicable

## 2023-09-07 NOTE — TELEPHONE ENCOUNTER
Is there presence of macular edema?: {MACULAR EDEMA:421151}  Level of severity of retinopathy: {SEVERITY:413031}  Was this communicated to preferring provider/PCP:  {yes/no:573326}

## 2023-09-08 NOTE — TELEPHONE ENCOUNTER
Addressed in [employer] message from yesterday.  Patient scheduled.    Jaimie Sullivan MSN, RN   Specialty Clinic, 9/8/2023 8:34 AM

## 2023-09-13 NOTE — PROGRESS NOTES
SUBJECTIVE:     Janette Rahman is an 82 year old female who is seen in follow-up for evaluation of  right knee   Osteoarthritis and medial meniscus and lateral meniscus tears based on a 12/7/20 MRI. Despite mechanical symptoms at times, corticosteroid injections have helped and she doesn't want surgery.      Treatments tried to this point:   Because of the lack of mechanical symptoms, Corticosteroid injection 12/10/20.  Length of effectiveness: 5-6 months  Corticosteroid injection 6/2/21: close to one years.  Cortisone injection 6/9/22: 10+ months.5/18/23 X 4 months.  Corticosteroid injection 5/18/23 lasted 3-4 months     Also has suspected right rotator cuff tear arthropathy. Had a corticosteroid injection with Dr. Mandujano that helped x 2 months   Wants this evaluated also.     Present symptoms: a lot of pain in the shoulder. Worse than the knee    Review of Systems:  Constitutional/General: Negative for fever, chills, change in weight  Integumentary/Skin: Negative for worrisome rashes, moles, or lesions  Neuro: Negative for weakness, dizziness, or paresthesias   Psychiatric: negative for changes in mood or affect    OBJECTIVE:  Physical Exam:  BP (!) 166/88 (BP Location: Left arm, Patient Position: Sitting, Cuff Size: Adult Regular)   Pulse 53   Ht 1.524 m (5')   Wt 62.1 kg (137 lb)   LMP  (LMP Unknown)   SpO2 100%   BMI 26.76 kg/m    General Appearance: healthy, alert and no distress   Skin: no suspicious lesions or rashes  Neuro: Normal strength and tone, mentation intact and speech normal  Vascular: good pulses, and capillary refill   Lymph: no lymphadenopathy   Psych:  mentation appears normal and affect normal/bright  Resp: no increased work of breathing    Right Knee Exam:  Gait: walks with antalgic gait favoring left side   Alignment: mild valgus     Patellofemoral joint: mild crepitations in the patellofemoral joint.  Effusion: mild  ROM: 0-130+  Tender: medial joint line and lateral joint  line  Ligaments:   Lachman's: stable   Anterior/Posterior drawer: stable,   Varus/Valgus stress: stable to varus and valgus stress    Right shoulder:  4/5 supraspinatus strength. Belly press equivocal.   Active flexion 110, snapping.      ASSESSMENT:   Osteoarthritis right knee  Her knee osteoarthritis is not severe. She has a meniscus tear but no mechanical symptoms.   Osteoarthritis right glenohumeral joint   Possible rotator cuff tear arthropathy    PLAN:   Discussed options in the future  Will likely need total shoulder arthroplasty or RTSA right shoulder in the future  Repeat right knee injection today:  With the patient's consent, right knee(s) injected intra-articularly with 80mg of Depomedrol and 4cc of local anesthetic after sterile prep.   She has an image guided corticosteroid injection coming up soon.   She is going to [place] for the winter. Could consider viscosupplementation injection as well.       GIANNA Schultz MD  Dept. Orthopedic Surgery  [facility]

## 2023-09-14 ENCOUNTER — OFFICE VISIT (OUTPATIENT)
Dept: ORTHOPEDICS | Facility: CLINIC | Age: 83
End: 2023-09-14
Payer: COMMERCIAL

## 2023-09-14 VITALS
DIASTOLIC BLOOD PRESSURE: 88 MMHG | BODY MASS INDEX: 26.9 KG/M2 | HEIGHT: 60 IN | SYSTOLIC BLOOD PRESSURE: 166 MMHG | WEIGHT: 137 LBS | HEART RATE: 53 BPM | OXYGEN SATURATION: 100 %

## 2023-09-14 DIAGNOSIS — S83.241A OTHER TEAR OF MEDIAL MENISCUS OF RIGHT KNEE, UNSPECIFIED WHETHER OLD OR CURRENT TEAR, INITIAL ENCOUNTER: ICD-10-CM

## 2023-09-14 DIAGNOSIS — M19.011 OSTEOARTHRITIS OF GLENOHUMERAL JOINT, RIGHT: Primary | ICD-10-CM

## 2023-09-14 DIAGNOSIS — S83.281A OTHER TEAR OF LATERAL MENISCUS OF RIGHT KNEE, UNSPECIFIED WHETHER OLD OR CURRENT TEAR, INITIAL ENCOUNTER: ICD-10-CM

## 2023-09-14 DIAGNOSIS — M17.11 PRIMARY OSTEOARTHRITIS OF RIGHT KNEE: ICD-10-CM

## 2023-09-14 PROCEDURE — 20610 DRAIN/INJ JOINT/BURSA W/O US: CPT | Mod: RT | Performed by: ORTHOPAEDIC SURGERY

## 2023-09-14 PROCEDURE — 99213 OFFICE O/P EST LOW 20 MIN: CPT | Mod: 25 | Performed by: ORTHOPAEDIC SURGERY

## 2023-09-14 RX ORDER — LIDOCAINE HYDROCHLORIDE 10 MG/ML
4 INJECTION, SOLUTION EPIDURAL; INFILTRATION; INTRACAUDAL; PERINEURAL
Status: DISCONTINUED | OUTPATIENT
Start: 2023-09-14 | End: 2024-06-28

## 2023-09-14 RX ORDER — METHYLPREDNISOLONE ACETATE 80 MG/ML
80 INJECTION, SUSPENSION INTRA-ARTICULAR; INTRALESIONAL; INTRAMUSCULAR; SOFT TISSUE
Status: DISCONTINUED | OUTPATIENT
Start: 2023-09-14 | End: 2024-06-28

## 2023-09-14 RX ADMIN — METHYLPREDNISOLONE ACETATE 80 MG: 80 INJECTION, SUSPENSION INTRA-ARTICULAR; INTRALESIONAL; INTRAMUSCULAR; SOFT TISSUE at 13:39

## 2023-09-14 RX ADMIN — LIDOCAINE HYDROCHLORIDE 4 ML: 10 INJECTION, SOLUTION EPIDURAL; INFILTRATION; INTRACAUDAL; PERINEURAL at 13:39

## 2023-09-14 ASSESSMENT — PAIN SCALES - GENERAL: PAINLEVEL: MILD PAIN (3)

## 2023-09-14 NOTE — LETTER
9/14/2023         RE: Janette Anderson  [address]  [place] [address]        Dear Colleague,    Thank you for referring your patient, Janette Anderson, to the [facility]. Please see a copy of my visit note below.    SUBJECTIVE:     Janette Rahman is an 82 year old female who is seen in follow-up for evaluation of  right knee   Osteoarthritis and medial meniscus and lateral meniscus tears based on a 12/7/20 MRI. Despite mechanical symptoms at times, corticosteroid injections have helped and she doesn't want surgery.      Treatments tried to this point:   Because of the lack of mechanical symptoms, Corticosteroid injection 12/10/20.  Length of effectiveness: 5-6 months  Corticosteroid injection 6/2/21: close to one years.  Cortisone injection 6/9/22: 10+ months.5/18/23 X 4 months.  Corticosteroid injection 5/18/23 lasted 3-4 months     Also has suspected right rotator cuff tear arthropathy. Had a corticosteroid injection with Dr. Mandujano that helped x 2 months   Wants this evaluated also.     Present symptoms: a lot of pain in the shoulder. Worse than the knee    Review of Systems:  Constitutional/General: Negative for fever, chills, change in weight  Integumentary/Skin: Negative for worrisome rashes, moles, or lesions  Neuro: Negative for weakness, dizziness, or paresthesias   Psychiatric: negative for changes in mood or affect    OBJECTIVE:  Physical Exam:  BP (!) 166/88 (BP Location: Left arm, Patient Position: Sitting, Cuff Size: Adult Regular)   Pulse 53   Ht 1.524 m (5')   Wt 62.1 kg (137 lb)   LMP  (LMP Unknown)   SpO2 100%   BMI 26.76 kg/m    General Appearance: healthy, alert and no distress   Skin: no suspicious lesions or rashes  Neuro: Normal strength and tone, mentation intact and speech normal  Vascular: good pulses, and capillary refill   Lymph: no lymphadenopathy   Psych:  mentation appears normal and affect normal/bright  Resp: no increased work of  breathing    Right Knee Exam:  Gait: walks with antalgic gait favoring left side   Alignment: mild valgus     Patellofemoral joint: mild crepitations in the patellofemoral joint.  Effusion: mild  ROM: 0-130+  Tender: medial joint line and lateral joint line  Ligaments:   Lachman's: stable   Anterior/Posterior drawer: stable,   Varus/Valgus stress: stable to varus and valgus stress    Right shoulder:  4/5 supraspinatus strength. Belly press equivocal.   Active flexion 110, snapping.      ASSESSMENT:   Osteoarthritis right knee  Her knee osteoarthritis is not severe. She has a meniscus tear but no mechanical symptoms.   Osteoarthritis right glenohumeral joint   Possible rotator cuff tear arthropathy    PLAN:   Discussed options in the future  Will likely need total shoulder arthroplasty or RTSA right shoulder in the future  Repeat right knee injection today:  With the patient's consent, right knee(s) injected intra-articularly with 80mg of Depomedrol and 4cc of local anesthetic after sterile prep.   She has an image guided corticosteroid injection coming up soon.   She is going to [place] for the winter. Could consider viscosupplementation injection as well.       GIANNA Schultz MD  Dept. Orthopedic Surgery  [facility]       Large Joint Injection/Arthocentesis: R knee joint    Date/Time: 9/14/2023 1:39 PM    Performed by: Ho Mendoza  Authorized by: David Schultz MD    Indications:  Pain and osteoarthritis  Needle Size:  22 G  Guidance: landmark guided    Approach:  Anterolateral  Location:  Knee      Medications:  80 mg methylPREDNISolone 80 MG/ML; 4 mL lidocaine (PF) 1 %  Outcome:  Tolerated well, no immediate complications  Procedure discussed: discussed risks, benefits, and alternatives    Consent Given by:  Patient  Timeout: timeout called immediately prior to procedure    Prep: patient was prepped and draped in usual sterile fashion          Again, thank you for allowing me to participate  in the care of your patient.        Sincerely,        David Schultz MD

## 2023-09-14 NOTE — TELEPHONE ENCOUNTER
MEDICATION APPEAL APPROVED    Medication: DUPIXENT 300 MG/2ML SC SOPN  Authorization Effective Date: 8/25/2023  Authorization Expiration Date: 3/9/2024  Approved Dose/Quantity: 6ml per 28 days  Reference #: Key: [id number]   [employer]: [employer]  Expected CoPay: $1814.66     CoPay Card Available: No  Financial Assistance Needed: yes  Which Pharmacy is filling the prescription: [facility] - [place], [place] - [address]  Patient Notified: yes

## 2023-09-14 NOTE — PROGRESS NOTES
Large Joint Injection/Arthocentesis: R knee joint    Date/Time: 9/14/2023 1:39 PM    Performed by: Ho Mendoza  Authorized by: David Schultz MD    Indications:  Pain and osteoarthritis  Needle Size:  22 G  Guidance: landmark guided    Approach:  Anterolateral  Location:  Knee      Medications:  80 mg methylPREDNISolone 80 MG/ML; 4 mL lidocaine (PF) 1 %  Outcome:  Tolerated well, no immediate complications  Procedure discussed: discussed risks, benefits, and alternatives    Consent Given by:  Patient  Timeout: timeout called immediately prior to procedure    Prep: patient was prepped and draped in usual sterile fashion

## 2023-09-19 NOTE — TELEPHONE ENCOUNTER
FREE DRUG APPLICATION INITIATED    Medication: DUPIXENT 300 MG/2ML SC SOPN  Free Drug Program Name: [employer] [facility]  Start Date: 9/19  Phone #: [phone]  Fax #: [phone]   Additional Information: Faxed provider portion

## 2023-09-19 NOTE — TELEPHONE ENCOUNTER
Patient has decided to move forward with free drug program. Emailed provider portion of pap over to clinic (tomas

## 2023-09-21 NOTE — TELEPHONE ENCOUNTER
[employer] states they have not received patient portion of application. Sent my chart message to patient to ask her to resubmit her portion.

## 2023-09-25 NOTE — TELEPHONE ENCOUNTER
Called [employer] and spoke with Meghna, all documents are in. Patient needs to call in and express hardship. Sending [employer] message to patient.

## 2023-09-25 NOTE — TELEPHONE ENCOUNTER
FREE DRUG APPLICATION APPROVED    Medication: DUPIXENT 300 MG/2ML SC SOPN  Program Name: [employer] [facility]on  Effective Date: 9/25/2023  Expiration Date: 12/31/2023  [facility] - [place], [place] - [address]  Patient Notified: Yes  Additional Information:

## 2023-09-26 ENCOUNTER — OFFICE VISIT (OUTPATIENT)
Dept: ORTHOPEDICS | Facility: CLINIC | Age: 83
End: 2023-09-26
Payer: COMMERCIAL

## 2023-09-26 DIAGNOSIS — M19.011 OSTEOARTHRITIS OF GLENOHUMERAL JOINT, RIGHT: Primary | ICD-10-CM

## 2023-09-26 PROCEDURE — 20611 DRAIN/INJ JOINT/BURSA W/US: CPT | Mod: RT | Performed by: FAMILY MEDICINE

## 2023-09-26 RX ADMIN — ROPIVACAINE HYDROCHLORIDE 4 ML: 5 INJECTION, SOLUTION EPIDURAL; INFILTRATION; PERINEURAL at 14:11

## 2023-09-26 RX ADMIN — BETAMETHASONE SODIUM PHOSPHATE AND BETAMETHASONE ACETATE 6 MG: 3; 3 INJECTION, SUSPENSION INTRA-ARTICULAR; INTRALESIONAL; INTRAMUSCULAR; SOFT TISSUE at 14:11

## 2023-09-26 NOTE — LETTER
9/26/2023         RE: Janette Anderson  [address]  [place] [address]        Dear Colleague,    Thank you for referring your patient, Janette Anderson, to the [facility] [place]. Please see a copy of my visit note below.    ASSESSMENT & PLAN    Janette was seen today for pain.    Diagnoses and all orders for this visit:    Osteoarthritis of glenohumeral joint, right  -     Large Joint Injection/Arthocentesis: R glenohumeral joint        # Right Glenohumeral Joint Arthritis: Janette Anderson  was seen today for acute on chronic right shoulder pain. Symptoms had been going on for 1+ years, pain with overhead movement. On examination there are positive findings of tenderness to palpation over the shoulder joint. Imaging findings showed left shoulder arthritis. Likely cause of patient's condition due to flare of shoulder arthritis. Other possible conditions contributing to symptoms include irritated rotator cuff tendon.  Counseled patient on nature of condition and treatment options.  Given this plan as below, follow-up 3-4 weeks as needed     Image Findings: right shoulder arthritis  Treatment: Activities as tolerated, home exercises given today  Medications/Injections: Limited tylenol/ibuprofen for pain for 1-2 weeks, Topical Voltaren gel, right shoulder joint steroid injection  Follow-up: In one month if symptoms do not improve, sooner if worsening  Can consider repeat injections    -----    SUBJECTIVE:  Janette Anderson is a 82 year old female who is seen in follow-up for right shoulder pain. They were last seen 6/6/2023 and right GH injection was performed.  The patient is seen with their .    Since their last visit reports persisting right shoulder pain.  T They have tried Right GH steroid injection 6/6/23, 5/9/22 by Dr. Giuliano Segal.        Patient's past medical, surgical, social, and family histories were reviewed today and no changes are noted.    REVIEW OF  SYSTEMS:  Constitutional: NEGATIVE for fever, chills, change in weight  Skin: NEGATIVE for worrisome rashes, moles or lesions  GI/: NEGATIVE for bowel or bladder changes  Neuro: NEGATIVE for weakness, dizziness or paresthesias    OBJECTIVE:  LMP  (LMP Unknown)    General: healthy, alert and in no distress  HEENT: no scleral icterus or conjunctival erythema  Skin: no suspicious lesions or rash. No jaundice.  CV: regular rhythm by palpation, no pedal edema  Resp: normal respiratory effort without conversational dyspnea   Psych: normal mood and affect  Gait: normal steady gait with appropriate coordination and balance  Neuro: normal light touch sensory exam of the extremities.    MSK:    RIGHT SHOULDER  Inspection:    no swelling, bruising, discoloration, or obvious deformity or asymmetry  Palpation:    Tender about the posterior shoulder. Remainder of bony and tendinous landmarks are nontender.  Active Range of Motion:     Abduction 1050, FF 1050, , IR hip pocket.    Strength:    Scapular plane abduction 5/5,  ER 5/5, IR 5/5, biceps 5/5, triceps 5/5  Special Tests:    Positive: None    Negative: Neer's, Quintana', and supraspinatus (empty can)    CERVICAL SPINE  Inspection:    normal cervical lordosis present, rounded shoulders, forward head posture  Palpation:    Nontender.  Range of Motion:     Flexion full    Extension full    Right side bend full    Left side bend full    Right rotation full    Left rotation full  Strength:    Full strength throughout all neck muscles  Special Tests:    Positive: None    Negative: Spurling's (bilateral)    Independent visualization of the below image:     XR SHOULDER RIGHT G/E 3 VIEWS  5/4/2022 1:43 PM      HISTORY: Chronic right shoulder pain  COMPARISON: None                                                                      IMPRESSION: No acute fracture or malalignment. Severe glenohumeral and  mild acromioclavicular joint degenerative changes. Suture anchor in  the  humeral head is compatible with rotator cuff repair. No hardware  complication. Osteopenia.     Angelito Mandujano MD, [facility]    Disclaimer: This note consists of symbols derived from keyboarding, dictation and/or voice recognition software. As a result, there may be errors in the script that have gone undetected. Please consider this when interpreting information found in this chart.    Large Joint Injection/Arthocentesis: R glenohumeral joint    Date/Time: 9/26/2023 2:11 PM    Performed by: Angelito Mandujano MD  Authorized by: Angelito Mandujano MD    Indications:  Pain and osteoarthritis  Needle Size:  25 G  Guidance: ultrasound    Approach:  Posterolateral  Location:  Shoulder      Site:  R glenohumeral joint  Medications:  6 mg betamethasone acet & sod phos 6 (3-3) MG/ML; 4 mL ROPivacaine 5 MG/ML  Outcome:  Tolerated well, no immediate complications  Procedure discussed: discussed risks, benefits, and alternatives    Consent Given by:  Patient  Timeout: timeout called immediately prior to procedure    Prep: patient was prepped and draped in usual sterile fashion     Ultrasound images of procedure were permanently stored.     Patient reported improvement of pain after the numbing portion right glenohumeral joint steroid injection.  Ultrasound guided images were permanently stored.   Aftercare instructions given to patient.  Plan to follow-up as discussed above.     Angelito Mandujano MD [facility]            Again, thank you for allowing me to participate in the care of your patient.        Sincerely,        Angelito Mandujano MD

## 2023-09-26 NOTE — PROGRESS NOTES
ASSESSMENT & PLAN    Janette was seen today for pain.    Diagnoses and all orders for this visit:    Osteoarthritis of glenohumeral joint, right  -     Large Joint Injection/Arthocentesis: R glenohumeral joint        # Right Glenohumeral Joint Arthritis: Janette Anderson  was seen today for acute on chronic right shoulder pain. Symptoms had been going on for 1+ years, pain with overhead movement. On examination there are positive findings of tenderness to palpation over the shoulder joint. Imaging findings showed left shoulder arthritis. Likely cause of patient's condition due to flare of shoulder arthritis. Other possible conditions contributing to symptoms include irritated rotator cuff tendon.  Counseled patient on nature of condition and treatment options.  Given this plan as below, follow-up 3-4 weeks as needed     Image Findings: right shoulder arthritis  Treatment: Activities as tolerated, home exercises given today  Medications/Injections: Limited tylenol/ibuprofen for pain for 1-2 weeks, Topical Voltaren gel, right shoulder joint steroid injection  Follow-up: In one month if symptoms do not improve, sooner if worsening  Can consider repeat injections    -----    SUBJECTIVE:  Janette Anderson is a 82 year old female who is seen in follow-up for right shoulder pain. They were last seen 6/6/2023 and right GH injection was performed.  The patient is seen with their .    Since their last visit reports persisting right shoulder pain.  T They have tried Right GH steroid injection 6/6/23, 5/9/22 by Dr. Giuliano Segal.        Patient's past medical, surgical, social, and family histories were reviewed today and no changes are noted.    REVIEW OF SYSTEMS:  Constitutional: NEGATIVE for fever, chills, change in weight  Skin: NEGATIVE for worrisome rashes, moles or lesions  GI/: NEGATIVE for bowel or bladder changes  Neuro: NEGATIVE for weakness, dizziness or paresthesias    OBJECTIVE:  LMP  (LMP Unknown)     General: healthy, alert and in no distress  HEENT: no scleral icterus or conjunctival erythema  Skin: no suspicious lesions or rash. No jaundice.  CV: regular rhythm by palpation, no pedal edema  Resp: normal respiratory effort without conversational dyspnea   Psych: normal mood and affect  Gait: normal steady gait with appropriate coordination and balance  Neuro: normal light touch sensory exam of the extremities.    MSK:    RIGHT SHOULDER  Inspection:    no swelling, bruising, discoloration, or obvious deformity or asymmetry  Palpation:    Tender about the posterior shoulder. Remainder of bony and tendinous landmarks are nontender.  Active Range of Motion:     Abduction 1050, FF 1050, , IR hip pocket.    Strength:    Scapular plane abduction 5/5,  ER 5/5, IR 5/5, biceps 5/5, triceps 5/5  Special Tests:    Positive: None    Negative: Neer's, Quintana', and supraspinatus (empty can)    CERVICAL SPINE  Inspection:    normal cervical lordosis present, rounded shoulders, forward head posture  Palpation:    Nontender.  Range of Motion:     Flexion full    Extension full    Right side bend full    Left side bend full    Right rotation full    Left rotation full  Strength:    Full strength throughout all neck muscles  Special Tests:    Positive: None    Negative: Spurling's (bilateral)    Independent visualization of the below image:     XR SHOULDER RIGHT G/E 3 VIEWS  5/4/2022 1:43 PM      HISTORY: Chronic right shoulder pain  COMPARISON: None                                                                      IMPRESSION: No acute fracture or malalignment. Severe glenohumeral and  mild acromioclavicular joint degenerative changes. Suture anchor in  the humeral head is compatible with rotator cuff repair. No hardware  complication. Osteopenia.     Angelito Mandujano MD, [facility]    Disclaimer: This note consists of symbols derived from keyboarding, dictation and/or voice recognition  software. As a result, there may be errors in the script that have gone undetected. Please consider this when interpreting information found in this chart.    Large Joint Injection/Arthocentesis: R glenohumeral joint    Date/Time: 9/26/2023 2:11 PM    Performed by: Angelito Mandujano MD  Authorized by: Angelito Mandujano MD    Indications:  Pain and osteoarthritis  Needle Size:  25 G  Guidance: ultrasound    Approach:  Posterolateral  Location:  Shoulder      Site:  R glenohumeral joint  Medications:  6 mg betamethasone acet & sod phos 6 (3-3) MG/ML; 4 mL ROPivacaine 5 MG/ML  Outcome:  Tolerated well, no immediate complications  Procedure discussed: discussed risks, benefits, and alternatives    Consent Given by:  Patient  Timeout: timeout called immediately prior to procedure    Prep: patient was prepped and draped in usual sterile fashion     Ultrasound images of procedure were permanently stored.     Patient reported improvement of pain after the numbing portion right glenohumeral joint steroid injection.  Ultrasound guided images were permanently stored.   Aftercare instructions given to patient.  Plan to follow-up as discussed above.     Angelito Mandujano MD [facility]

## 2023-09-26 NOTE — PATIENT INSTRUCTIONS
# Right Glenohumeral Joint Arthritis: Janette Anderson  was seen today for acute on chronic right shoulder pain. Symptoms had been going on for 1+ years, pain with overhead movement. On examination there are positive findings of tenderness to palpation over the shoulder joint. Imaging findings showed left shoulder arthritis. Likely cause of patient's condition due to flare of shoulder arthritis. Other possible conditions contributing to symptoms include irritated rotator cuff tendon.  Counseled patient on nature of condition and treatment options.  Given this plan as below, follow-up 3-4 weeks as needed     Image Findings: right shoulder arthritis  Treatment: Activities as tolerated, home exercises given today  Medications/Injections: Limited tylenol/ibuprofen for pain for 1-2 weeks, Topical Voltaren gel, right shoulder joint steroid injection  Follow-up: In one month if symptoms do not improve, sooner if worsening  Can consider repeat injections    Please call [phone]   Ask for my team if you have any questions or concerns    If you have not yet received the influenza vaccine but would like to get one, please call  [phone] or you can schedule via [employer]    It was great seeing you again today!    Angelito Mandujano MD, CAQSM     [facility] Injection Discharge Instructions    Procedure: right shoulder joint steroid injection    You may shower, however avoid swimming, tub baths or hot tubs for 24 hours following your procedure  You may have a mild to moderate increase in pain for several days following the injection.  It may take up to 14 days for the steroid medication to start working although you may feel the effect as early as a few days after the procedure.  You may use ice packs for 10-15 minutes, 3 to 4 times a day at the injection site for comfort  You may use anti-inflammatory medications (such as Ibuprofen or Aleve or Advil) or Tylenol for pain control if necessary  If you were fasting, you may  resume your normal diet and medications after the procedure  If you have diabetes, check your blood sugar more frequently than usual as your blood sugar may be higher than normal for 10-14 days following a steroid injection. Contact your doctor who manages your diabetes if your blood sugar is higher than usual    If you experience any of the following, call [facility] @ [phone] or [phone]  -Fever over 100 degree F  -Swelling, bleeding, redness, drainage, warmth at the injection site  - New or worsening pain

## 2023-09-28 RX ORDER — BETAMETHASONE SODIUM PHOSPHATE AND BETAMETHASONE ACETATE 3; 3 MG/ML; MG/ML
6 INJECTION, SUSPENSION INTRA-ARTICULAR; INTRALESIONAL; INTRAMUSCULAR; SOFT TISSUE
Status: DISCONTINUED | OUTPATIENT
Start: 2023-09-26 | End: 2024-06-28

## 2023-09-28 RX ORDER — ROPIVACAINE HYDROCHLORIDE 5 MG/ML
4 INJECTION, SOLUTION EPIDURAL; INFILTRATION; PERINEURAL
Status: DISCONTINUED | OUTPATIENT
Start: 2023-09-26 | End: 2024-06-28

## 2023-10-17 ENCOUNTER — VIRTUAL VISIT (OUTPATIENT)
Dept: RHEUMATOLOGY | Facility: CLINIC | Age: 83
End: 2023-10-17
Attending: STUDENT IN AN ORGANIZED HEALTH CARE EDUCATION/TRAINING PROGRAM
Payer: COMMERCIAL

## 2023-10-17 DIAGNOSIS — Z78.9 TAKES DIETARY SUPPLEMENTS: ICD-10-CM

## 2023-10-17 DIAGNOSIS — L20.89 OTHER ATOPIC DERMATITIS: ICD-10-CM

## 2023-10-17 DIAGNOSIS — I10 ESSENTIAL HYPERTENSION, BENIGN: ICD-10-CM

## 2023-10-17 DIAGNOSIS — E78.5 HYPERLIPIDEMIA, UNSPECIFIED HYPERLIPIDEMIA TYPE: ICD-10-CM

## 2023-10-17 DIAGNOSIS — R52 PAIN: ICD-10-CM

## 2023-10-17 DIAGNOSIS — L20.9 ATOPIC DERMATITIS: Primary | ICD-10-CM

## 2023-10-17 DIAGNOSIS — L20.89 OTHER ATOPIC DERMATITIS: Primary | ICD-10-CM

## 2023-10-17 DIAGNOSIS — H04.129 DRY EYE: ICD-10-CM

## 2023-10-17 RX ORDER — CARBOXYMETHYLCELLULOSE SODIUM 10 MG/ML
1 GEL OPHTHALMIC DAILY PRN
COMMUNITY
End: 2024-06-28

## 2023-10-17 RX ORDER — CLOBETASOL PROPIONATE 0.5 MG/G
CREAM TOPICAL 2 TIMES DAILY PRN
COMMUNITY
Start: 2023-09-06 | End: 2024-06-28

## 2023-10-17 RX ORDER — EMOLLIENT BASE
CREAM (GRAM) TOPICAL PRN
COMMUNITY
End: 2024-06-28

## 2023-10-17 RX ORDER — PLANT STANOL ESTER 450 MG
2.5 TABLET ORAL EVERY MORNING
COMMUNITY
End: 2024-06-28

## 2023-10-17 NOTE — PROGRESS NOTES
Medication Therapy Management (MTM) Encounter    ASSESSMENT:                            Medication Adherence/Access: No issues identified    Atopic dermatitis & chronic urticaria, dermatographism:   Provided education on Dupixent today including dosing, general administration, side effects (both common/serious), precautions, monitoring and time to efficacy. Discussed data on malignancy and risk of serious infection. Encouraged indicated non-live vaccines and avoidance of live vaccines. Encouraged patient to contact the Dermatology clinic in the event they have questions on this. Would benefit from continuing Dupixent.     Pain: Stable, controlled.     Hypertension: Stable, controlled.      Hyperlipidemia: Stable, controlled.     Dry eyes: Stable, controlled.     Supplements: Stable, controlled.     PLAN:                            Order sent for Dupixent to [facility] (#[phone]). They will contact patient pending insurance coverage.     Follow-up: Check in at ~1 month and ~3 months. Then going forward we will touch base approximately every 6 months.      SUBJECTIVE/OBJECTIVE:                          Janette Anderson is a 82 year old female called for an initial visit. She was referred to me from Dr. Fam Hernandez MD.      Reason for visit: Dupixent check in.    Allergies/ADRs: Reviewed in chart  Past Medical History: Reviewed in chart  Tobacco: She reports that she has never smoked. She has never used smokeless tobacco.  Alcohol: 5-7 beverages / week per chart review    Medication Adherence/Access: no issues reported    Atopic dermatitis & chronic urticaria, dermatographism:   - Dupixent (dupilumab) 300 mg every 14 days (completed loading dose and one maintenance dose)  - Betamethasone 0.05% lotion: apply on scalp for itching as needed (partially effective)  - Clobetasol 0.05% cream: apply to itching spots twice daily as needed (effective)  - Gabapentin 300 mg capsule: Take 1 capsule by mouth  three times daily (when held, flares/itching worsens, so recently restarted; hopes to try reducing again once Dupixent starts having effect)   - Vanicream topically multiple times daily     Side effects: no reported adverse effects at this time.     Symptoms: itching seems to worsen with stress (recently wrapped up a 3 year long legal mix that seemed to worsen it and hasn't had relief since stopping). Has not had effect from Dupixent yet.    Specialist: Dr. Fam Hernandez MD, Dermatology. Last visit on 8/17/23. The following was recommended:   - Labs for further evaluation; anti-TPO antibodies, TSH, urticaria induced basophil activation and anti-IgE antibody  - Start fexofenadine 180 mg, up to 4 tablets per day  - Stop gabapentin  - diffuse itching often associated w/ immune senescence related to aging and natural TH2 shift  - will explore dupixent as treatment option     Previous treatment: Allegra x 4 tablets daily was not effective therefore discontinued. Triamcinolone was not effective topically, therefore switched to clobetasol.     All patients on biologics should avoid live vaccines (varicella/VZV, intranasal influenza, MMR, or yellow fever vaccine (if traveling))      Immunization History   Administered Status   Covid-19 Booster Due to receive   Influenza (annual, 1122-2612) Due to receive, avoid live FluMist   Pneumococcal  Prevnar-13: 5/25/17, 5/3/16  Pneumovax-23: 12/18/08   Prevnar-20: none Up-to-date     Tetanus/Tdap  Up-to-date   Shingrix Due to receive   RSV (only for ? 60 years old)  Due to receive     Pain:    - Ibuprofen 200 mg tablet: take 2 tablets every 4 hours as needed for pain (frequency: more scheduled at this time, but decreasing)     Pain from recent fall while cleaning bathtub where she now has pain on her ribs. Patient feels that current therapy is effective, and she is improving with time. Patient reports no current medication side effects.     Hypertension:   - Losartan 100 mg  "tablet: take 1 tablet by mouth once daily   - Hydrochlorothiazide 25 mg tablet: take 1 tablet by mouth once daily   - Spironolactone 25 mg tablet: take one-half tablet by mouth once daily     Patient reports the following medication side effects: \"off-balanced\" occasionally if she moves too quickly - tolerable and infrequent at this time - agrees to follow up with pcp if this becomes more frequent/bothersome.  Patient self-monitors blood pressure.  Home BP monitoring typically 120s/<80, although one time did have a 90/60 with symptoms of low blood pressure (dizziness/foggy).      BP Readings from Last 3 Encounters:   09/14/23 (!) 166/88   06/16/23 (!) 172/91   05/18/23 (!) 152/77     Pulse Readings from Last 3 Encounters:   09/14/23 53   06/16/23 52   05/18/23 59      Hyperlipidemia:   - Atorvastatin (Lipitor) 20 mg tablet: Take 1 tablet by mouth once daily.     Patient reports no current medication side effects, including myalgias.    Recent Labs   Lab Test 06/16/23  1122 06/09/22  1017 05/02/16  1203 10/09/15  1113   CHOL 193 195   < > 293*   HDL 77 61   < > 77   * 108*   < > 197*   TRIG 58 129   < > 92   CHOLHDLRATIO  --   --   --  3.8    < > = values in this interval not displayed.     Dry eyes:    -Thera tears OTC eye drops as needed     No current medication side effects.       Supplements:   - Potassium gluconate 90 mg 2%: Take 1 tablet by mouth in the morning daily (when she remembers)    No reported issues at this time.       Today's Vitals: LMP  (LMP Unknown)   ----------------    I spent 40 minutes with this patient today. All changes were made via collaborative practice agreement with Dr. Fam Hernandez MD. A copy of the visit note was provided to the patient's provider(s).    A summary of these recommendations was sent via [employer].    Kari Faust, Pharm.D.  Medication Therapy Management Pharmacist   [facility] Dermatology     Telemedicine Visit Details  Type of service:  Telephone " visit  Start Time: 1:00 PM  End Time: 1:40 PM     Medication Therapy Recommendations  No medication therapy recommendations to display

## 2023-10-17 NOTE — Clinical Note
"10/17/2023       RE: Janette Anderson  [address]  [place] [address]     Dear Colleague,    Thank you for referring your patient, Janette Anderson, to the [facility] [place] at [facility]. Please see a copy of my visit note below.      2 injections,   3rd one a few days ago    No difference.   Gabapentin callmed it down for a while but now its back full blast.   In [place] until may.     Betamethasone - on head itching helps a little but not totally.  Vanicream - yes multiple times per day (most days)  Pain on side - ibuprofen 2 every 4 hours - 200 mg tablet  Atorvastatin 20 tablet - unexplained muscle pain  Losartan 100 mg tablet  - off balanced if move too quickly - cuff bp regularly - dr office has high values - usually high 120s to 120 / <80. Sometimes under 70. Had as low as 90/60    Hydrochlorothiazide 25 -   Spironolactone 12.5 tablet (25 mg tablet) -  Gabapentin 300 mg three times daily -- no tiredness  Algera - quit now didn't help - even when taking 4 per day.  Clobetasol - for itching spots as needed for spots  Triamcinolone didn't work   Thera tears otc  No hyd    Potassium when she remembers it gluconate 90 mg 2% -- 1 in the morning about     Albuterol when sick - not needed now    Fall - maybe broke some ribs while cleaning the shower -- dr zaldivar in [place] on 4[place].     Next day was great day after date 2. 3 year long legal mix. Stress. Damage was done.  Itching increases with stress.      Prechart / check PCP affiliation and if derm provider signed CPA (will change what can be done during encounter)  Scheduled, arrived, & referral is placed (if not, place one in a new \"orders only\" encounter)  Chief complaint (MTM)  Vitals (in person) & screenings  [place] meds, allergies, and nicotine/tobacco as reviewed  Enter orders (labs/vaccines/meds): reorder specialty prescriptions (enter  MTM patient  in notes to " Pharmacy, enter prescribing specialty provider when prompted; note that some medications have an order panel; in renewal provider name, put my name so refill requests come to me)  SOAP note  Visit diagnoses   Enter carrier/plan (HB)  MTPs  Complete facility charge calculator (in person visits only)  Enter follow up information into MTM follow-up section   AVS / MyChart message  Remove pre-populated communication mgmt letter  Route  LOS (no charge)  Refresh SOAP note, then close encounter / sign visit    Visit structure:   Introduction, history of practice, purpose of visit  CMM visit  Focus on obtaining specialty medication access / follow up (as needed, at least every 6 months)     2 injections,   3rd one a few days ago    No difference.   Gabapentin callmed it down for a while but now its back full blast.   In [place] until may.     Betamethasone - on head itching helps a little but not totally.  Vanicream - yes multiple times per day (most days)  Pain on side - ibuprofen 2 every 4 hours - 200 mg tablet  Atorvastatin 20 tablet - unexplained muscle pain  Losartan 100 mg tablet  - off balanced if move too quickly - cuff bp regularly - dr office has high values - usually high 120s to 120 / <80. Sometimes under 70. Had as low as 90/60    Hydrochlorothiazide 25 -   Spironolactone 12.5 tablet (25 mg tablet) -  Gabapentin 300 mg three times daily -- no tiredness  Algera - quit now didn't help - even when taking 4 per day.  Clobetasol - for itching spots as needed for spots  Triamcinolone didn't work   Thera tears otc  No hyd    Potassium when she remembers it gluconate 90 mg 2% -- 1 in the morning about     Albuterol when sick - not needed now    Fall - maybe broke some ribs while cleaning the shower -- dr zaldivar [place] on 4[place].     Next day was great day after date 2. 3 year long legal mix. Stress. Damage was done.  Itching increases with stress.        Medication Therapy Management (MTM)  Encounter    ASSESSMENT:                            Medication Adherence/Access: {adherencechoices:264536}    ***:  ***      PLAN:                            ***    Follow-up: {followuptest2:080590}    SUBJECTIVE/OBJECTIVE:                          Janette Anderson is a 82 year old female { :594034} for {mtmvisit:674008}     Reason for visit: ***.    Allergies/ADRs: Reviewed in chart  Past Medical History: Reviewed in chart  Tobacco: She reports that she has never smoked. She has never used smokeless tobacco.  Alcohol: 5-7 beverages / week per chart review    Medication Adherence/Access: no issues reported    Atopic dermatitis & chronic urticaria, dermatographism:   - Dupixent (dupilumab) 300 mg every 14 days  {DERM TOPICALS:799313}    Side effects: ***.    Symptoms: ***.    Specialist: Dr. Fam Hernandez MD, Dermatology. Last visit on 8/17/23. The following was recommended:   - Labs for further evaluation; anti-TPO antibodies, TSH, urticaria induced basophil activation and anti-IgE antibody  - Start fexofenadine 180 mg, up to 4 tablets per day  - Stop gabapentin  - diffuse itching often associated w/ immune senescence related to aging and natural TH2 shift  - will explore dupixent as treatment option     Previous treatment: ***    All patients on biologics should avoid live vaccines (varicella/VZV, intranasal influenza, MMR, or yellow fever vaccine (if traveling))      Immunization History   Administered Status   Covid-19 Booster {Status:838096}   Influenza (annual, 0500-8253) {Status:205403}, avoid live FluMist   Pneumococcal  Prevnar-13: ***  Pneumovax-23: ***   Prevnar-20: *** {Status:706909}     Tetanus/Tdap  {Status:189327}   Shingrix {Status:211198}   RSV (only for = 60 years old)  {Status:288341}          Today's Vitals: LMP  (LMP Unknown)   ----------------    I spent 40 minutes with this patient today. All changes were made via collaborative practice agreement with Dr. Fam Hernandez MD. A copy of the visit  note was provided to the patient's provider(s).    A summary of these recommendations was sent via [employer].    Kari Faust, Pharm.D.  Medication Therapy Management Pharmacist   [facility] Dermatology     Telemedicine Visit Details  Type of service:  Telephone visit  Start Time: 1:00 PM  End Time: 1:40 PM     Medication Therapy Recommendations  No medication therapy recommendations to display       Again, thank you for allowing me to participate in the care of your patient.      Sincerely,    Kari Faust [facility]

## 2023-10-17 NOTE — Clinical Note
DOMINIQUE -- spoke with Janette this week. First couple weeks have gone well for her Dupixent; Will touch base in a few more weeks and again at 3 months to see if she starts noticing effects. She was unable to stop the gabapentin, but willing to revisit this as Dupixent hopefully starts working. Thanks! LO

## 2023-10-19 NOTE — PATIENT INSTRUCTIONS
"Recommendations from today's MTM visit:                                                    MTM (medication therapy management) is a service provided by a clinical pharmacist designed to help you get the most of out of your medicines.      Order sent for Dupixent to [facility] (#[phone]). They will contact patient pending insurance coverage.      Follow-up: Via [employer] (or phone call) at about 1 month and 3 months into Dupixent use    It was great speaking with you today.  I value your experience and would be very thankful for your time in providing feedback in our clinic survey. In the next few days, you may receive an email or text message from [employer] with a link to a survey related to your  clinical pharmacist.\"     To schedule another MTM appointment, please call the clinic directly or you may call the MTM scheduling line at [phone] or toll-free at [phone].     My Clinical Pharmacist's contact information:                                                      Please feel free to contact me with any questions or concerns you have.      Kari Faust, Pharm.D.  Medication Therapy Management Pharmacist   [facility] Dermatology    "

## 2023-10-25 ENCOUNTER — TELEPHONE (OUTPATIENT)
Dept: INTERNAL MEDICINE | Facility: CLINIC | Age: 83
End: 2023-10-25
Payer: COMMERCIAL

## 2023-11-27 DIAGNOSIS — L29.9 ITCHING: ICD-10-CM

## 2023-11-28 ENCOUNTER — VIRTUAL VISIT (OUTPATIENT)
Dept: RHEUMATOLOGY | Facility: CLINIC | Age: 83
End: 2023-11-28
Attending: STUDENT IN AN ORGANIZED HEALTH CARE EDUCATION/TRAINING PROGRAM
Payer: COMMERCIAL

## 2023-11-28 DIAGNOSIS — I10 ESSENTIAL HYPERTENSION, BENIGN: ICD-10-CM

## 2023-11-28 DIAGNOSIS — L20.9 ATOPIC DERMATITIS: Primary | ICD-10-CM

## 2023-11-28 RX ORDER — BETAMETHASONE DIPROPIONATE 0.5 MG/G
LOTION TOPICAL 2 TIMES DAILY PRN
COMMUNITY
End: 2024-06-28

## 2023-11-28 NOTE — PROGRESS NOTES
Medication Therapy Management (MTM) Encounter    ASSESSMENT:                            Medication Adherence/Access: No issues identified    Atopic dermatitis & chronic urticaria, dermatographism: Improved. Would benefit from continued monitoring to assure muscle/joint pain subsides since it is possible it is an adverse effect of the Dupixent (arthralgia 2-3%). Reasonable to continue Dupixent at this time due to benefits > risks, although keep monitoring and consider trial off + restart to assess if pains may be related to Dupixent use.     Would benefit from assuring continued access to free drug program in 2024.     Hypertension: Stable, controlled.     PLAN:                            Continue Dupixent -Patient Agreed     Follow-up: ~1/7/24     SUBJECTIVE/OBJECTIVE:                          Janette Anderson is a 82 year old female called for a follow-up visit from 10/17/23.       Reason for visit: Dupixent 30 day check in.    Allergies/ADRs: Reviewed in chart  Past Medical History: Reviewed in chart  Tobacco: She reports that she has never smoked. She has never used smokeless tobacco.  Alcohol: 5-7 beverages / week per chart review     Medication Adherence/Access: no issues reported     Atopic dermatitis & chronic urticaria, dermatographism:   - Dupixent (dupilumab) 300 mg every 14 days (started early October 2023)  - Betamethasone 0.05% lotion: apply on scalp for itching as needed (partially effective) - not currently using  - Clobetasol 0.05% cream: apply to itching spots twice daily as needed (effective) - not currently using   - Gabapentin 300 mg capsule: Take 1 capsule by mouth three times daily (when held, flares/itching worsens, so recently restarted; hopes to try reducing again once Dupixent starts having effect)   - Vanicream topically multiple times daily      Patient denies adverse effects including conjunctivitis.  Patient is no longer using any topicals and has reported improvement in her symptoms  since starting the Dupixent.  Does state that her skin is feeling thin, and she will bruise or cut easily if she hits her skin.  Has noticed an increase in muscle and joint pain (right knee has given her problems for about 3 years now but has injections that are helpful; left knee is now giving her some trouble; has had procedures done on right shoulder and these pains are coming back).  Wants to continue Dupixent at this time.  Has tried to reduce her gabapentin, but when she has she has noted increased symptoms, therefore has continued at this time but will continue to wean off of it. Wonders if she will need to be on Dupixent long term since she knows her symptoms are flared with stress and she was recently highly stressed with wrapping up a 3 year long legal mix; hopes she won't need to be on Dupixent long term.     Wonders when she will find out about next free drug program approval.      Specialist: Dr. Fam Hernandez MD, Dermatology. Last visit on 8/17/23. The following was recommended:   - Labs for further evaluation; anti-TPO antibodies, TSH, urticaria induced basophil activation and anti-IgE antibody  - Start fexofenadine 180 mg, up to 4 tablets per day  - Stop gabapentin  - diffuse itching often associated w/ immune senescence related to aging and natural TH2 shift  - will explore dupixent as treatment option     Previous treatment: Allegra x 4 tablets daily was not effective therefore discontinued. Triamcinolone was not effective topically, therefore switched to clobetasol. Not currently taking betamethasone or clobetasol or Vanicream although does report she has all her topicals at home.      All patients on biologics should avoid live vaccines (varicella/VZV, intranasal influenza, MMR, or yellow fever vaccine (if traveling))           Immunization History   Administered Status   Covid-19 Booster Due to receive   Influenza (annual, 3020-3632) Due to receive, avoid live FluMist    Pneumococcal  Prevnar-13: 5/25/17, 5/3/16  Pneumovax-23: 12/18/08   Prevnar-20: none Up-to-date      Tetanus/Tdap  Up-to-date   Shingrix Due to receive   RSV (only for ? 60 years old)  Due to receive      Hypertension:   - Losartan 100 mg tablet: take 1 tablet by mouth once daily   - Hydrochlorothiazide 25 mg tablet: take 1 tablet by mouth once daily   - Spironolactone 25 mg tablet: take one-half tablet by mouth once daily     Patient reports no current medication side effects. No longer feels off balanced.   Patient self-monitors blood pressure. Home BP monitoring: yesterday was active 148/80 after movement; later after resting it was 100/72.    BP Readings from Last 3 Encounters:   09/14/23 (!) 166/88   06/16/23 (!) 172/91   05/18/23 (!) 152/77     Pulse Readings from Last 3 Encounters:   09/14/23 53   06/16/23 52   05/18/23 59     Today's Vitals: LMP  (LMP Unknown)   ----------------  I spent 25 minutes with this patient today. All changes were made via collaborative practice agreement with  Dr. Fam Hernandez MD. A copy of the visit note was provided to the patient's provider(s).    A summary of these recommendations was sent via [employer].    Kari Faust, Pharm.D.  Medication Therapy Management Pharmacist   [facility] Dermatology    Telemedicine Visit Details  Type of service:  Telephone visit  Start Time: 11:00 AM  End Time: 11:25 PM     Medication Therapy Recommendations  No medication therapy recommendations to display

## 2023-11-28 NOTE — Clinical Note
Corona Otto   Janette is doing well on her Dupixent (started early October). Noted improvement and is no longer needing to use her topicals. Has not been able to come off of gabapentin but is trying to wean as able. She has noted some worsened joint pain in her shoulders/knees but is unsure if this is medication related or just age/movement related (potentially could be an adverse effect of arthralgia), but she would rather continue Dupixent and see if these aches/pains change over next month. I plan to follow up with her in about a month to see how she is doing again.   Thanks,  Kari TODD

## 2023-11-28 NOTE — PATIENT INSTRUCTIONS
"Recommendations from today's MTM visit:                                                    MTM (medication therapy management) is a service provided by a clinical pharmacist designed to help you get the most of out of your medicines.      Continue Dupixent    Follow-up: 90 day follow up ~1/7/24    It was great speaking with you today.  I value your experience and would be very thankful for your time in providing feedback in our clinic survey. In the next few days, you may receive an email or text message from [employer] [facility] with a link to a survey related to your  clinical pharmacist.\"     To schedule another MTM appointment, please call the clinic directly or you may call the MTM scheduling line at [phone] or toll-free at [phone].     My Clinical Pharmacist's contact information:                                                      Please feel free to contact me with any questions or concerns you have.      Kari Faust, Pharm.D.  Medication Therapy Management Pharmacist   [facility]  MTM Scheduling Line: [phone]    " Nephrology Progress Note    Subjective    No acute complaints.   Denies urinary complaints.   Wants to go home soon.        Objective     I/O's    Intake/Output Summary (Last 24 hours) at 9/23/2022 1209  Last data filed at 9/22/2022 1900  Gross per 24 hour   Intake --   Output 200 ml   Net -200 ml       Last Recorded Vitals  Blood pressure (!) 157/77, pulse (!) 103, temperature 98.2 °F (36.8 °C), temperature source Oral, resp. rate 18, height 5' 6\" (1.676 m), weight 54.1 kg (119 lb 4.3 oz), SpO2 95 %.  Body mass index is 19.25 kg/m².    Physical Exam  Vitals reviewed.   Constitutional:       General: She is not in acute distress.  HENT:      Head: Normocephalic and atraumatic.      Nose: Nose normal.      Mouth/Throat:      Mouth: Mucous membranes are moist.   Eyes:      Conjunctiva/sclera: Conjunctivae normal.   Cardiovascular:      Rate and Rhythm: Normal rate.   Pulmonary:      Effort: Pulmonary effort is normal.      Breath sounds: No wheezing.      Comments: Diminished bs, coarse bs  Abdominal:      Palpations: Abdomen is soft.      Tenderness: There is no guarding or rebound.   Musculoskeletal:      Right lower leg: No edema.      Left lower leg: No edema.   Skin:     General: Skin is warm and dry.   Neurological:      Mental Status: She is alert. Mental status is at baseline.   Psychiatric:         Mood and Affect: Mood normal.         Behavior: Behavior normal.         Labs     Recent Labs     09/21/22 0220 09/21/22  1138 09/22/22  0431 09/22/22  1651 09/22/22  2347 09/23/22  0357   SODIUM 139  --  140  --   --  140   POTASSIUM 3.3*   < > 2.9* 3.4 3.6 3.4   CO2 25  --  25  --   --  24   ANIONGAP 10  --  14  --   --  12   GLUCOSE 80  --  81  --   --  87   BUN 20  --  15  --   --  13   CREATININE 1.28*  --  1.04*  --   --  1.03*   BCRAT 16  --  14  --   --  13   CALCIUM 8.7  --  8.8  --   --  9.0    < > = values in this interval not displayed.        Recent Labs     09/21/22 0220 09/22/22  0431   WBC 9.1 8.7    RBC 2.42* 2.70*   HGB 7.9* 8.7*   HCT 24.0* 26.4*    364   MCV 99.2 97.8   MCH 32.6 32.2   MCHC 32.9 33.0   NRBCRE 0 0         Imaging      Assessment & Plan   Patient Active Problem List   Diagnosis   • Multiple falls   • Fall   • Acute metabolic encephalopathy   •  (ventriculoperitoneal) shunt status   • History of CVA (cerebrovascular accident)   • HTN (hypertension)   • Alcohol abuse   • Encounter for palliative care   • Peripheral arterial disease (CMS/HCC)   • Critical limb ischemia of left lower extremity (CMS/HCC)   • Hypertensive urgency       # TYSON on CKD 2  - CKD stage 2 with baseline Cr <1 as of August 2022. Previous UA's showing subnephrotic range proteinuria.   - Cr 5.77 mg/dL on presentation   - UA 9/12 alkalotic and showing trace leukocytes  - CT A/P in ER showing right renal atrophy and compensatory hypertrophy of the left kidney - unclear if congenital or acquired in the setting of SANDIE. No hydronephrosis.   - ATN in setting of prolonged prerenal state due to several weeks of n/v/d and poor PO intake   - Urine pH 6.5 on admission, inconsistent with expected acidotic urine in state of prolonged diarrhea. Concurrent hypokalemia and HTN raise concern for mineralocorticoid excess.    - also possible renal artery stenosis in the setting of atherosclerosis and solitary atrophic kidney    - k/cr ratio fairly high at >13 meq/g (although in the setting of TYSON and may have gotten supplements at the time)      Plan:  - Renal indices improved- cr down to 1.0  - ok for lasix if needed     # Hypokalemia  # HypoMg   - Initially thought to be secondary to GI losses. However, persistent hypokalemia despite improvement in loose stool frequency and difficult to control HTN raise suspicion for mineralocorticoid excess  - Renin level very elevated. Aldosterone/renin ratio not elevated. Will monitor.    - elevated renin with significant HTN and  unilateral kidney in patient with known atherosclerotic disease  raises suspicion of atherosclerotic renal artery stenosis resulting in atrophic unilateral kidney    - will hold of ACEi/diuretic given recent TYSON for now    - will check renal dopplers outpatient.    - replace k and mag and recheck levels. Continue to replace as needed  - would discharge on 40 meq K daily     # Metabolic acidosis  - serum bicarb improved     # Hypertensive urgency  # Hydrocephalus s/p  shunt   - on nifedipine.        # Cholelithiasis  # Nausea and vomiting   - afebrile with leukocytosis, uptrending   - general surgery consult noted, HIDA showing normal filling and excretion of gallbladder wihtout cystic duct occlusion. No plans for cholecystectomy as of now.      # Diarrhea   - afebrile, WBC 20   - cdiff +   - on abx      # SOB  - pulmonary following  - on abx for pneumonia  - s/p thoracentesis for pleural effusion     # Hx CVA   - with residual weakness

## 2023-11-29 ENCOUNTER — MYC REFILL (OUTPATIENT)
Dept: INTERNAL MEDICINE | Facility: CLINIC | Age: 83
End: 2023-11-29
Payer: COMMERCIAL

## 2023-11-29 DIAGNOSIS — L29.9 ITCHING: ICD-10-CM

## 2023-11-29 RX ORDER — GABAPENTIN 300 MG/1
300 CAPSULE ORAL 3 TIMES DAILY
Qty: 270 CAPSULE | Refills: 0 | Status: CANCELLED | OUTPATIENT
Start: 2023-11-29

## 2023-11-30 NOTE — TELEPHONE ENCOUNTER
gabapentin (NEURONTIN) 300 MG capsule 270 capsule 0 9/5/2023  Last Office Visit : 6/16/2023  [facility] Alyssa Schmitt MD     Future Office visit:  6/28/2024     Routing refill request to provider for review/approval because:  Drug not on the [facility], [facility] or [facility] refill protocol or controlled substance

## 2023-11-30 NOTE — TELEPHONE ENCOUNTER
gabapentin (NEURONTIN) 300 MG capsule     2nd request 11-29-23 my chart, resending as high priority

## 2023-12-01 RX ORDER — GABAPENTIN 300 MG/1
300 CAPSULE ORAL 3 TIMES DAILY
Qty: 270 CAPSULE | Refills: 3 | Status: SHIPPED | OUTPATIENT
Start: 2023-12-01 | End: 2024-06-28

## 2023-12-10 ENCOUNTER — HEALTH MAINTENANCE LETTER (OUTPATIENT)
Age: 83
End: 2023-12-10

## 2023-12-18 ENCOUNTER — TELEPHONE (OUTPATIENT)
Dept: DERMATOLOGY | Facility: CLINIC | Age: 83
End: 2023-12-18
Payer: COMMERCIAL

## 2023-12-18 NOTE — TELEPHONE ENCOUNTER
FREE DRUG APPLICATION INITIATED    Medication: DUPIXENT 300 MG/2ML SC SOPN  Free Drug Program Name:  Dupixent [facility]  Date Submitted: 12/18/2023 12:04 PM  Phone #: [phone]  Fax #: [phone]  Additional Information: spoke with program and re-enrollment is pending. May not have outcome until 2024

## 2024-01-05 NOTE — TELEPHONE ENCOUNTER
FREE DRUG APPLICATION APPROVED    Medication: DUPIXENT 300 MG/2ML SC SOPN  Program Name:  Dupixent [facility]  Effective Date: 1/4/2024  Expiration Date: 12/31/2024  Pharmacy Filling the Rx: [facility] [address]  Patient Notified: yes  Additional Information:

## 2024-01-07 DIAGNOSIS — L20.89 OTHER ATOPIC DERMATITIS: ICD-10-CM

## 2024-01-08 ENCOUNTER — VIRTUAL VISIT (OUTPATIENT)
Dept: RHEUMATOLOGY | Facility: CLINIC | Age: 84
End: 2024-01-08
Attending: PHARMACIST
Payer: COMMERCIAL

## 2024-01-08 DIAGNOSIS — L20.9 ATOPIC DERMATITIS: Primary | ICD-10-CM

## 2024-01-08 DIAGNOSIS — I10 ESSENTIAL HYPERTENSION, BENIGN: ICD-10-CM

## 2024-01-08 NOTE — Clinical Note
Corie Lopez,   My name is Kari and I am an MTM pharmacist working with Dr. Hernandez in dermatology. Janette had some confusion about if she should be reducing her gabapentin dosing since her skin is improving; I recommended she touch base with you because she has noted she has tried to reduce it and had some returning tingling which makes me think she may be using it more for neuropathy. Would you be okay with touching base with her about why you are using that one so she feels comfortable with her next steps (wonders if she should be continuing or reducing her dose)?  Thank you for your time,  Kari TODD

## 2024-01-08 NOTE — Clinical Note
1/8/2024       RE: Janette Anderson  [address] [place] [address]     Dear Colleague,    Thank you for referring your patient, Janette Anderson, to the [facility] [place] [facility]. Please see a copy of my visit note below.    Medication Therapy Management (MTM) Encounter    ASSESSMENT:                            Medication Adherence/Access: {adherencechoices:496187}    Atopic dermatitis & chronic urticaria, dermatographism: ***    Hypertension: ***       PLAN:                            Continue Dupixent as prescribed.   Consider receiving the following vaccinations: COVID-19 booster, annual flu shot, shingles (Shingrix; 2 dose-series), and RSV.     Follow-up: every 6 months and as needed.    SUBJECTIVE/OBJECTIVE:                          Janette Anderson is a 83 year old female called for a follow-up visit from 11/28/23.       Reason for visit: Dupixent 90 day check in.    Allergies/ADRs: Reviewed in chart.  Past Medical History: Reviewed in chart.  Tobacco: She reports that she has never smoked. She has never used smokeless tobacco.  Alcohol: 5-7 beverages / week per chart review.    Medication Adherence/Access: {fumedadherence:266709}    Atopic dermatitis & chronic urticaria, dermatographism:   - Dupixent (dupilumab) 300 mg every 14 days (started early October 2023)  - Betamethasone 0.05% lotion: apply on scalp for itching as needed   - Clobetasol 0.05% cream: apply to itching spots twice daily as needed    - Gabapentin 300 mg capsule: Take 1 capsule by mouth three times daily  - Vanicream topically multiple times daily as needed      Patient denies adverse effects including conjunctivitis. Patient is no longer using any topicals and has reported improvement in her symptoms since starting the Dupixent; has supply of topicals at home.  Does state that her skin is feeling thin, and she will bruise or cut easily if she hits her  skin.    Has noticed an increase in muscle and joint pain (right knee has given her problems for about 3 years now but has injections that are helpful; left knee is now giving her some trouble; has had procedures done on right shoulder and these pains are coming back). Left bisep stapped so not when on dupixent. Right shoulder needs prelacement.     Injection on Saturday.   Has tried to reduce her gabapentin, but when she has she has noted increased symptoms, therefore has continued at this time but will continue to wean off of it. More tingles when coming off of it. Only don't it a day or two in a row, but wonders if she could do more. Never had the dryness; would have rashes on her back that would itch; nothing helped until Dupixent. Still no topicals.   Wonders if she will need to be on Dupixent long term since she knows her symptoms are flared with stress and she was recently highly stressed with wrapping up a 3 year long legal mix; hopes she won't need to be on Dupixent long term.   Stress is done now.     Wonders when she will find out about next free drug program approval.      Specialist: Dr. Fam Hernandez MD, Dermatology. Last visit on 8/17/23.      Previous treatment:   - Allegra x 4 tablets daily was not effective, therefore discontinued.   - Triamcinolone was not effective topically, therefore switched to clobetasol, which is more effective for her.   - Betamethasone lotion is only partially effective.   - Gabapentin has been helpful for her to reduce itching/flares.    Immunization History   Covid-19 vaccine (5112-6034 version)  Due to receive   Influenza (annual, 0879-8741) Due to receive, avoid live FluMist   Pneumococcal  Prevnar-13: 5/25/17, 5/3/16  Pneumovax-23: 12/18/08   Prevnar-20: none Up-to-date   Tetanus/Tdap  Up-to-date   Shingrix Due to receive   RSV (only for ? 60 years old)  Due to receive   All patients on biologics should avoid live vaccines (varicella/VZV, intranasal influenza, MMR,  or yellow fever vaccine (if traveling))       Hypertension:   - Losartan 100 mg tablet: take 1 tablet by mouth once daily   - Hydrochlorothiazide 25 mg tablet: take 1 tablet by mouth once daily   - Spironolactone 25 mg tablet: take one-half tablet by mouth once daily     Patient reports no current medication side effects.  Patient self-monitors blood pressure. Home BP monitoring 11/27 124/80 (55), 12/13 after being busy 152/81 (55), 25 minutes later on 12/13 was 107/68 (68), 12/18 130/75 (64).    BP Readings from Last 3 Encounters:   09/14/23 (!) 166/88   06/16/23 (!) 172/91   05/18/23 (!) 152/77     Pulse Readings from Last 3 Encounters:   09/14/23 53   06/16/23 52   05/18/23 59     Today's Vitals: LMP  (LMP Unknown)   ----------------  I spent *** minutes with this patient today. All changes were made via collaborative practice agreement with  Dr. Fam Hernandez MD. A copy of the visit note was provided to the patient's provider(s).    A summary of these recommendations was sent via [email or web address].    Kari Faust, Pharm.D.  Medication Therapy Management Pharmacist   [facility]    Telemedicine Visit Details  Type of service:  Telephone visit  Start Time: {video/phone visit start time:152948}  End Time: {video/phone visit end time:152948}     Medication Therapy Recommendations  No medication therapy recommendations to display        Again, thank you for allowing me to participate in the care of your patient.      Sincerely,    Kari Faust [facility]

## 2024-01-08 NOTE — PROGRESS NOTES
Medication Therapy Management (MTM) Encounter    ASSESSMENT:                            Medication Adherence/Access: No issues identified.    Atopic dermatitis & chronic urticaria, dermatographism: Stable, controlled. May benefit from following up with her primary care provider to discuss gabapentin use/dose.     Hypertension: Stable, controlled resting home blood pressures.        PLAN:                            Continue Dupixent as prescribed.   Consider receiving the following vaccinations: COVID-19 booster, annual flu shot, shingles (Shingrix; 2 dose-series), and RSV.   Writer will message Dr. Lopez to update with patients questions around gabapentin use.     Follow-up: every 6 months and as needed.    SUBJECTIVE/OBJECTIVE:                          Janette Anderson is a 83 year old female called for a follow-up visit from 11/28/23.       Reason for visit: Dupixent 90 day check in.    Allergies/ADRs: Reviewed in chart.  Past Medical History: Reviewed in chart.  Tobacco: She reports that she has never smoked. She has never used smokeless tobacco.  Alcohol: 5-7 beverages / week per chart review.    Medication Adherence/Access: no issues reported    Atopic dermatitis & chronic urticaria, dermatographism:   - Dupixent (dupilumab) 300 mg every 14 days (started early October 2023)  - Betamethasone 0.05% lotion: apply on scalp for itching as needed   - Clobetasol 0.05% cream: apply to itching spots twice daily as needed    - Gabapentin 300 mg capsule: Take 1 capsule by mouth three times daily  - Vanicream topically multiple times daily as needed      Patient denies adverse effects including conjunctivitis. Has noticed an increase in muscle and joint pain lately although contributes all pains to issues that preexisted Dupixent use (right knee, left knee, right shoulder, left shoulder). Patient is no longer using any topicals and has reported improvement in her symptoms since starting the Dupixent; has supply of  topicals at home if ever needed. Does state that her skin is feeling thin, and she will bruise or cut easily if she hits her skin. Last Dupixent injection was given on Saturday.     Has tried to reduce her gabapentin dosing (for 1-2 days) thinking she was using that to reduce her dermatologic symptoms. Notices an increase in tingling when reducing her dose. Open to discussing with the prescribing provider, her primary care doctor, since she is interested in reducing this dose if possible. Considers that she was maybe started it for neuropathy but she is unsure.    Previously wondered if she would need to be on Dupixent long term since she knows her symptoms are flared with stress and now notes her stress is gone (she recently wrapped up a 3 year long legal mix). Is happy with her current control and hopes to continue Dupixent.       Specialist: Dr. Fam Hernandez MD, Dermatology. Last visit on 8/17/23.      Previous treatment:   - Allegra x 4 tablets daily was not effective, therefore discontinued.   - Triamcinolone was not effective topically, therefore switched to clobetasol, which is more effective for her.   - Betamethasone lotion is only partially effective.   - Gabapentin has been helpful for her to reduce itching/flares.    Immunization History   Covid-19 vaccine (2686-5908 version)  Due to receive   Influenza (annual, 4615-3906) Due to receive, avoid live FluMist   Pneumococcal  Prevnar-13: 5/25/17, 5/3/16  Pneumovax-23: 12/18/08   Prevnar-20: none Up-to-date   Tetanus/Tdap  Up-to-date   Shingrix Due to receive   RSV (only for ? 60 years old)  Due to receive   All patients on biologics should avoid live vaccines (varicella/VZV, intranasal influenza, MMR, or yellow fever vaccine (if traveling))       Hypertension:   - Losartan 100 mg tablet: take 1 tablet by mouth once daily   - Hydrochlorothiazide 25 mg tablet: take 1 tablet by mouth once daily   - Spironolactone 25 mg tablet: take one-half tablet by mouth  once daily     Patient reports no current medication side effects. Patient self-monitors blood pressure. Home BP monitoring 11/27/23 was 124/80 (55), 12/13/23 after being busy was 152/81 (55), 25 minutes later the same day was 107/68 (68), 12/18 130/75 (64).    BP Readings from Last 3 Encounters:   09/14/23 (!) 166/88   06/16/23 (!) 172/91   05/18/23 (!) 152/77     Pulse Readings from Last 3 Encounters:   09/14/23 53   06/16/23 52   05/18/23 59     Today's Vitals: LMP  (LMP Unknown)   ----------------  I spent 20 minutes with this patient today. All changes were made via collaborative practice agreement with  Dr. Fam Hernandez MD. A copy of the visit note was provided to the patient's provider(s).    A summary of these recommendations was sent via [employer]    Kari Faust, Pharm.D.  Medication Therapy Management Pharmacist   [facility]at[facility]    Telemedicine Visit Details  Type of service:  Telephone visit  Start Time: 11:00 AM  End Time: 11:20 AM     Medication Therapy Recommendations  No medication therapy recommendations to display

## 2024-01-08 NOTE — Clinical Note
DOMINIQUE -- Janette is doing great! I will now just check in with her about every 6 months (or as needed if something comes up with her) to assure her continued Dupixent access/improvement.

## 2024-01-09 NOTE — TELEPHONE ENCOUNTER
dupilumab (DUPIXENT) 300 MG/2ML prefilled pen        Last Written Prescription Date:  10/19/23  Last Fill Quantity: 4 ml ,   # refills: 2  Last Office Visit : 8/17/23  Future Office visit:  None    Routing refill request to provider for review/approval because:  Referred to MT for Dupixent   Last ordered by   Kari Faust RPH  ADULT RHEUMATOLOGY   Last visit MTM 1/8/24

## 2024-01-10 NOTE — PATIENT INSTRUCTIONS
"Recommendations from today's MTM visit:                                                    MTM (medication therapy management) is a service provided by a clinical pharmacist designed to help you get the most of out of your medicines.      Continue your current medications. I will update both your dermatologist and your primary care providers.      Follow-up: every 6 months and as needed     It was great speaking with you today.  I value your experience and would be very thankful for your time in providing feedback in our clinic survey. In the next few days, you may receive an email or text message from [employer] with a link to a survey related to your  clinical pharmacist.\"     To schedule another MTM appointment, please call the clinic directly or you may call the MTM scheduling line at [phone].    My Clinical Pharmacist's contact information:                                                      Please feel free to contact me with any questions or concerns you have.      Kari Faust, Pharm.D.  Medication Therapy Management Pharmacist   [facility] Dermatology  MTM Scheduling Line: [phone]    "

## 2024-01-18 DIAGNOSIS — L20.89 OTHER ATOPIC DERMATITIS: ICD-10-CM

## 2024-04-03 DIAGNOSIS — R60.9 EDEMA, UNSPECIFIED TYPE: Primary | ICD-10-CM

## 2024-04-11 RX ORDER — HYDROCHLOROTHIAZIDE 25 MG/1
25 TABLET ORAL DAILY
Qty: 90 TABLET | Refills: 0 | Status: SHIPPED | OUTPATIENT
Start: 2024-04-11 | End: 2024-06-28

## 2024-06-06 ENCOUNTER — OFFICE VISIT (OUTPATIENT)
Dept: INTERNAL MEDICINE | Facility: CLINIC | Age: 84
End: 2024-06-06
Payer: COMMERCIAL

## 2024-06-06 VITALS
BODY MASS INDEX: 27.35 KG/M2 | WEIGHT: 139.3 LBS | OXYGEN SATURATION: 99 % | DIASTOLIC BLOOD PRESSURE: 82 MMHG | HEIGHT: 60 IN | HEART RATE: 57 BPM | SYSTOLIC BLOOD PRESSURE: 170 MMHG

## 2024-06-06 DIAGNOSIS — N30.01 ACUTE CYSTITIS WITH HEMATURIA: Primary | ICD-10-CM

## 2024-06-06 DIAGNOSIS — I10 ESSENTIAL HYPERTENSION, BENIGN: ICD-10-CM

## 2024-06-06 DIAGNOSIS — E78.5 HYPERLIPIDEMIA, UNSPECIFIED HYPERLIPIDEMIA TYPE: ICD-10-CM

## 2024-06-06 DIAGNOSIS — Z29.11 NEED FOR VACCINATION AGAINST RESPIRATORY SYNCYTIAL VIRUS: ICD-10-CM

## 2024-06-06 DIAGNOSIS — R30.0 DYSURIA: ICD-10-CM

## 2024-06-06 DIAGNOSIS — Z51.81 MEDICATION MONITORING ENCOUNTER: ICD-10-CM

## 2024-06-06 DIAGNOSIS — Z12.31 VISIT FOR SCREENING MAMMOGRAM: ICD-10-CM

## 2024-06-06 DIAGNOSIS — R09.89 LABILE BLOOD PRESSURE: ICD-10-CM

## 2024-06-06 LAB
ALBUMIN UR-MCNC: NEGATIVE MG/DL
APPEARANCE UR: ABNORMAL
BACTERIA #/AREA URNS HPF: ABNORMAL /HPF
BILIRUB UR QL STRIP: NEGATIVE
COLOR UR AUTO: ABNORMAL
GLUCOSE UR STRIP-MCNC: NEGATIVE MG/DL
HGB UR QL STRIP: ABNORMAL
KETONES UR STRIP-MCNC: NEGATIVE MG/DL
LEUKOCYTE ESTERASE UR QL STRIP: ABNORMAL
NITRATE UR QL: NEGATIVE
PH UR STRIP: 7 [PH] (ref 5–7)
RBC URINE: 9 /HPF
SP GR UR STRIP: 1.01 (ref 1–1.03)
UROBILINOGEN UR STRIP-MCNC: NORMAL MG/DL
WBC CLUMPS #/AREA URNS HPF: PRESENT /HPF
WBC URINE: >182 /HPF

## 2024-06-06 PROCEDURE — 87086 URINE CULTURE/COLONY COUNT: CPT | Performed by: PEDIATRICS

## 2024-06-06 PROCEDURE — 99213 OFFICE O/P EST LOW 20 MIN: CPT | Mod: GC

## 2024-06-06 PROCEDURE — 99000 SPECIMEN HANDLING OFFICE-LAB: CPT | Performed by: PATHOLOGY

## 2024-06-06 PROCEDURE — 81001 URINALYSIS AUTO W/SCOPE: CPT | Performed by: PATHOLOGY

## 2024-06-06 PROCEDURE — 87186 SC STD MICRODIL/AGAR DIL: CPT | Performed by: PEDIATRICS

## 2024-06-06 RX ORDER — RESPIRATORY SYNCYTIAL VIRUS VACCINE 120MCG/0.5
0.5 KIT INTRAMUSCULAR ONCE
Qty: 1 EACH | Refills: 0 | Status: CANCELLED | OUTPATIENT
Start: 2024-06-06 | End: 2024-06-06

## 2024-06-06 RX ORDER — SODIUM PHOSPHATE,MONO-DIBASIC 19G-7G/118
1000 ENEMA (ML) RECTAL DAILY
COMMUNITY

## 2024-06-06 RX ORDER — VITS A,C,E/LUTEIN/MINERALS 300MCG-200
1 TABLET ORAL DAILY
COMMUNITY

## 2024-06-06 NOTE — PROGRESS NOTES
Assessment & Plan     Dysuria  Patient with ongoing abdominal pain and dysuria, despite being treated for a presumed lower urinary tract infection with a course of Keflex at  an urgent care facility in [place].  Differential includes urinary tract infection involving a multidrug-resistant organism versus potential bladder spasms/irritation driven by recent infection. No fevers/chills and no CVA tenderness, so less c/f upper UTI or pyelonephritis  - UA Macroscopic with reflex to Microscopic and Culture - Clinic Collect   -If UA is c/f UTI, then will check culture and prescribe course of antibiotics accordingly   -If UA is unremarkable, then will prescribe a course of pyridium    Urinary stasis, chronic  - Adult Urology  Referral; Future    Essential hypertension, benign  Labile blood pressure  Patient with fluctuating blood pressures ranging from as low as 90/62 155/80.  Patient is on a relatively complex anti-hypertensive regimen.  Patient does not measure her blood pressure prior to taking her medications.  She endorses having hypotensive episodes in the past.  - Encourage patient to invest in a blood pressure cuff around the arm as opposed to the forearm  - Counseled patient to take blood pressure measurements in the morning prior to taking her antihypertensive and follow these instructions based on the readings   -If SBP <=100, then do not take BP medications at that time and recheck BP in the afternoon   -If SBP > 100, then okay to take BP medications but then recheck BP in the afternoon  -Requested patient to keep a daily log of her blood pressure measurements and indicate whether or not she had taken her medications that day  -Patient's PCP (Dr. Lopez) to follow-up in several weeks   -Discussed performing a 24-hour ambulatory blood pressure check however unlikely to be covered by Medicaid      Post Medication Reconciliation Status:     BMI  Estimated body mass index is 27.21 kg/m  as calculated  from the following:    Height as of this encounter: 1.524 m (5').    Weight as of this encounter: 63.2 kg (139 lb 4.8 oz).         Subjective     Janette Anderson is a 83-year-old female, history significant for hypertension, who presents for for follow-up after recent urgent care visit in [place] for a suspected lower urinary tract infection.    Patient spends the winter months in [place]; back in middle of May, the patient reports having sudden onset painful abdominal cramps along with dysuria.  She tried to tolerate the symptoms for approximately 10 days before ultimately presenting to urgent care on May 22, 2024; there, her urine showed some large leukocyte esterases but no bacteria.  Given her symptoms, the patient was prescribed a course of Macrobid.  Patient had an acute allergic reaction to the Macrobid causing her to break out in hives in her lower extremities so she returned to the urgent care facility on May 23 at which point, she was given a course of Keflex.  She completed the antibiotic course without any difficulty however she felt that her urinary symptoms continue to persist.  Patient returned to [place] at the start of June and spoke with her primary care provider who she is scheduled to see at the end of the month but recommended that she be evaluated by someone else in the primary care clinic for her recent visit and ongoing symptoms before hand.  Patient reports that her urine was cloudy and somewhat foul-smelling this morning.  Patient denies any fevers or chills, back pain, abdominal pain, nausea, vomiting.  Patient also is worried about difficulty with starting a urinary stream, which has been an issue that she has been dealing with for years and but has not sought medical attention on.    Additionally, the patient is on various medications for her blood pressure.  Patient reports that her blood pressure is relatively labile and which sometimes her blood pressure is as low as 90/60 and  sometimes as high as 155/80.  Patient uses a blood pressure cuff around her risked to measure her blood pressure.  Patient does endorse occasionally having symptoms of hypotension and has previously noted when working with therapy, she had passed out and and route to being transported to the emergency room, she was found to have a blood pressure of 55/40.    Review of Systems  Constitutional, neuro, ENT, endocrine, pulmonary, cardiac, gastrointestinal, genitourinary, musculoskeletal, integument and psychiatric systems are negative, except as otherwise noted.      Objective    BP (!) 170/82 (BP Location: Right arm, Patient Position: Sitting, Cuff Size: Adult Regular)   Pulse 57   Ht 1.524 m (5')   Wt 63.2 kg (139 lb 4.8 oz)   LMP  (LMP Unknown)   SpO2 99%   BMI 27.21 kg/m    Body mass index is 27.21 kg/m .    Physical Exam   GENERAL: alert and no distress  NECK: no adenopathy, no asymmetry, masses, or scars  RESP: lungs clear to auscultation - no rales, rhonchi or wheezes  CV: regular rate and rhythm, normal S1 S2, no S3 or S4, no murmur, click or rub, no peripheral edema  ABDOMEN: soft, nontender, no hepatosplenomegaly, no masses and bowel sounds normal. No CVA tenderness  MS: no gross musculoskeletal defects noted, no edema      Signed Electronically by: Jenn Mendoza MD

## 2024-06-06 NOTE — PATIENT INSTRUCTIONS
Janette Anderson, it was a pleasure seeing you in clinic today (June 6, 2024). To briefly summarize our plan moving forward:    1) We will check your urine to make sure you don't have an infection.    2) I will give you a referral to Urology to help with your urinary stasis.    3) Regarding your blood pressure, I would like you to keep a log of your daily blood pressures between now and when you see your PCP. Please check your BP first thing in the AM before taking your medications; if the upper number is <= 100, then please don't take your medications and write it down in your log. For the days you take your medications, please measure your blood pressure again in the afternoon to determine whether or not your current BP regimen.     If you have any further questions or concerns, please call the clinic ([phone]) or send a message via the Mob[employer] feature.   Thank you for entrusting me with your care, and I look forward to seeing you at your next clinic visit.    Jenn Mendoza MD  Internal Medicine  PGY-1  [facility]

## 2024-06-06 NOTE — PROGRESS NOTES
I, Isaac Ruiz MD saw the patient with the resident, and agree with the resident's findings and plan of care as documented in the resident's note.  BP (!) 170/82 (BP Location: Right arm, Patient Position: Sitting, Cuff Size: Adult Regular)   Pulse 57   Ht 1.524 m (5')   Wt 63.2 kg (139 lb 4.8 oz)   LMP  (LMP Unknown)   SpO2 99%   BMI 27.21 kg/m    I personally reviewed vital signs and past record.  Key findings: HTN. Would like to do amb BP but this will not be covered by her insurance. Will try to acquire data on her own.  Still having dysuria after antibiotics prescribed in [place].

## 2024-06-07 ENCOUNTER — MYC MEDICAL ADVICE (OUTPATIENT)
Dept: INTERNAL MEDICINE | Facility: CLINIC | Age: 84
End: 2024-06-07
Payer: COMMERCIAL

## 2024-06-07 DIAGNOSIS — B96.5 PSEUDOMONAS URINARY TRACT INFECTION: Primary | ICD-10-CM

## 2024-06-07 DIAGNOSIS — N39.0 PSEUDOMONAS URINARY TRACT INFECTION: Primary | ICD-10-CM

## 2024-06-07 RX ORDER — CIPROFLOXACIN 750 MG/1
750 TABLET, FILM COATED ORAL 2 TIMES DAILY
Qty: 20 TABLET | Refills: 0 | Status: SHIPPED | OUTPATIENT
Start: 2024-06-07 | End: 2024-06-17

## 2024-06-07 NOTE — CONFIDENTIAL NOTE
coordinators please call patient to get her in Monday in-person for ECG (for QT prolongation)

## 2024-06-08 LAB — BACTERIA UR CULT: ABNORMAL

## 2024-06-08 NOTE — PROGRESS NOTES
Preliminary UA results c/f possible UTI, so urine sample reflexed to urine culture, now found to be growing Pseudomonas aeruginosa. Given this type of infection, need to rule out possible obstructive causes that may have caused it. Patient without history of diabetes, no indwelling catheters or tubes. Will need to consider possible obstructive kidney stones. Patient recently treated with antibiotics which may contributed to infection    Discussed case with Dr. Ruiz. Plan to prescribe a course of Ciprofloxacin 750 mg BID x 10 days. EKG study to be done Monday (6/10) to ensure no Qtc prolongation while on medications. Will also obtain kidney US to evaluate for any kidney stones.    I personsally called the patient to update her on the findings and next steps; she expresses understanding and is amenable to the treatment/workup course.    Jenn Mendoza MD  Internal Medicine  PGY-1

## 2024-06-10 DIAGNOSIS — Z51.81 MEDICATION MONITORING ENCOUNTER: ICD-10-CM

## 2024-06-10 LAB
ATRIAL RATE - MUSE: 59 BPM
DIASTOLIC BLOOD PRESSURE - MUSE: NORMAL MMHG
INTERPRETATION ECG - MUSE: NORMAL
P AXIS - MUSE: 35 DEGREES
PR INTERVAL - MUSE: 174 MS
QRS DURATION - MUSE: 84 MS
QT - MUSE: 410 MS
QTC - MUSE: 405 MS
R AXIS - MUSE: -3 DEGREES
SYSTOLIC BLOOD PRESSURE - MUSE: NORMAL MMHG
T AXIS - MUSE: 17 DEGREES
VENTRICULAR RATE- MUSE: 59 BPM

## 2024-06-10 PROCEDURE — 93000 ELECTROCARDIOGRAM COMPLETE: CPT | Performed by: INTERNAL MEDICINE

## 2024-06-23 SDOH — HEALTH STABILITY: PHYSICAL HEALTH: ON AVERAGE, HOW MANY DAYS PER WEEK DO YOU ENGAGE IN MODERATE TO STRENUOUS EXERCISE (LIKE A BRISK WALK)?: 3 DAYS

## 2024-06-23 SDOH — HEALTH STABILITY: PHYSICAL HEALTH: ON AVERAGE, HOW MANY MINUTES DO YOU ENGAGE IN EXERCISE AT THIS LEVEL?: 50 MIN

## 2024-06-23 ASSESSMENT — SOCIAL DETERMINANTS OF HEALTH (SDOH): HOW OFTEN DO YOU GET TOGETHER WITH FRIENDS OR RELATIVES?: MORE THAN THREE TIMES A WEEK

## 2024-06-27 ENCOUNTER — MYC MEDICAL ADVICE (OUTPATIENT)
Dept: DERMATOLOGY | Facility: CLINIC | Age: 84
End: 2024-06-27
Payer: COMMERCIAL

## 2024-06-27 NOTE — PROGRESS NOTES
SUBJECTIVE:  Janette Anderson is a 83 year old female who is seen in consult at the request of Alyssa Lopez MD for right shoulder pain.  She has had issues with this since following right reverse total shoulder arthroplasty done at [facility]. 'CYN TEMPLE, ZOILA PARKER 2/19/24    HPI: Right shoulder replacement surgery on February 19th  - Recovery from surgery was good until a stretch caused pain  She was reaching up for a lamp switch. Had sudden pain.     Present symptoms: pain anterior shoulder  Feels some new lumps under the skin anteriorly  Some pain down arm  Range of motion about the same as it was before the episode..  Trouble sleeping laying flat now.     Op note documents biceps tenodesis to the pec tendon. Subscap was intact, and taken down for the approach, and repaired.     Treatment up to this point: nsaids, tylenol    Past Medical History:   Past Medical History:   Diagnosis Date    HLD (hyperlipidaemia)     HTN (hypertension)     Osteoporosis     Primary osteoarthritis of right knee      Past Surgical History:   Past Surgical History:   Procedure Laterality Date    ADENOIDECTOMY      BUNIONECTOMY      CARPAL TUNNEL RELEASE RT/LT      meniscal tear knee Right 12/2020    ROTATOR CUFF REPAIR RT/LT  2/2006    SHOULDER SURGERY      impingment    TONSILLECTOMY       Family History:   Family History   Problem Relation Age of Onset    Coronary Artery Disease Mother     Hypertension Mother     Cerebrovascular Disease Mother         60-70's    Heart Disease Father         valve replacement in 80's    Cancer No family hx of         no skin cancer     Social History:   Social History     Tobacco Use    Smoking status: Never    Smokeless tobacco: Never   Substance Use Topics    Alcohol use: Not Currently     Alcohol/week: 1.7 - 2.5 standard drinks of alcohol     Comment: 3 glasses per week       Review of Systems:  Constitutional:  NEGATIVE for fever, chills, change in weight  Integumentary/Skin:   NEGATIVE for worrisome rashes, moles or lesions  Eyes:  NEGATIVE for vision changes or irritation  ENT/Mouth:  NEGATIVE for ear, mouth and throat problems  Resp:  NEGATIVE for significant cough or SOB  Breast:  NEGATIVE for masses, tenderness or discharge  CV:  NEGATIVE for chest pain, palpitations or peripheral edema  GI:  NEGATIVE for nausea, abdominal pain, heartburn, or change in bowel habits  :  Negative   Musculoskeletal:  See HPI above  Neuro:  NEGATIVE for weakness, dizziness or paresthesias  Endocrine:  NEGATIVE for temperature intolerance, skin/hair changes  Heme/allergy/immune:  NEGATIVE for bleeding problems  Psychiatric:  NEGATIVE for changes in mood or affect    OBJECTIVE:  Physical Exam:  BP (!) 152/75 (BP Location: Left arm)   Pulse 58   LMP  (LMP Unknown)   SpO2 99%   General Appearance: healthy, alert and in no distress   Skin: no suspicious lesions or rashes  Neuro: Normal strength and tone, mentation intact and speech normal  Vascular: good pulses, and cappillary refill   Lymph: no lymphadenopathy   Psych:  mentation appears normal and affect normal/bright  Resp: no increased work of breathing      Right Shoulder Exam:  Shoulder Inspection: she has a priya biceps deformity, which she says has been there since the surgery.   Tender: anterior shoulder, along the biceps groove.  Some small nodules palpable under the skin, likely suture knots.  No redness, fluctuance, or bogginess.   Range of Motion:   Active: forward flexion: 80, internal rotation: back pocket   Passive: forward flexion: 100, external rotation: 50  Strength: forward flexion: pretty good. 5-/5, external rotation: 5-/5, Liftoff: Unable, belly press weak    X-rays:  Obtained today,7/1/2024 are reviewed in the office with the patient:  Components in good alignment, well fixed. No complications. No scapular notching.      ASSESSMENT:  Encounter Diagnoses   Name Primary?    Acute postoperative pain of right shoulder Yes    Status  post total shoulder arthroplasty, right       I suspect biceps or subscap repair failure     PLAN:  I suggested physical therapy, I reassured her that her function should still be pretty good, and the pain should resolve  Reminded her about antibiotics for dental procedures for 1 year  Topical nsaids and deep massage of the area may help the pain as well..    Return to clinic: as needed     GIANNA Schultz MD  Dept. Orthopedic Surgery  [facility]

## 2024-06-28 ENCOUNTER — LAB (OUTPATIENT)
Dept: LAB | Facility: CLINIC | Age: 84
End: 2024-06-28
Payer: COMMERCIAL

## 2024-06-28 ENCOUNTER — OFFICE VISIT (OUTPATIENT)
Dept: INTERNAL MEDICINE | Facility: CLINIC | Age: 84
End: 2024-06-28
Payer: COMMERCIAL

## 2024-06-28 VITALS
DIASTOLIC BLOOD PRESSURE: 80 MMHG | OXYGEN SATURATION: 99 % | WEIGHT: 137 LBS | BODY MASS INDEX: 26.76 KG/M2 | HEART RATE: 59 BPM | SYSTOLIC BLOOD PRESSURE: 163 MMHG

## 2024-06-28 DIAGNOSIS — I10 ESSENTIAL HYPERTENSION, BENIGN: Primary | ICD-10-CM

## 2024-06-28 DIAGNOSIS — E78.5 HYPERLIPIDEMIA, UNSPECIFIED HYPERLIPIDEMIA TYPE: ICD-10-CM

## 2024-06-28 DIAGNOSIS — Z12.31 VISIT FOR SCREENING MAMMOGRAM: ICD-10-CM

## 2024-06-28 DIAGNOSIS — I10 ESSENTIAL HYPERTENSION, BENIGN: ICD-10-CM

## 2024-06-28 DIAGNOSIS — R60.9 EDEMA, UNSPECIFIED TYPE: ICD-10-CM

## 2024-06-28 DIAGNOSIS — L29.9 ITCHING: ICD-10-CM

## 2024-06-28 DIAGNOSIS — M25.511 ACUTE PAIN OF RIGHT SHOULDER: ICD-10-CM

## 2024-06-28 DIAGNOSIS — E78.5 HLD (HYPERLIPIDEMIA): ICD-10-CM

## 2024-06-28 LAB
ANION GAP SERPL CALCULATED.3IONS-SCNC: 10 MMOL/L (ref 7–15)
BUN SERPL-MCNC: 11.5 MG/DL (ref 8–23)
CALCIUM SERPL-MCNC: 9.9 MG/DL (ref 8.8–10.2)
CHLORIDE SERPL-SCNC: 95 MMOL/L (ref 98–107)
CHOLEST SERPL-MCNC: 302 MG/DL
CREAT SERPL-MCNC: 0.6 MG/DL (ref 0.51–0.95)
DEPRECATED HCO3 PLAS-SCNC: 26 MMOL/L (ref 22–29)
EGFRCR SERPLBLD CKD-EPI 2021: 90 ML/MIN/1.73M2
FASTING STATUS PATIENT QL REPORTED: ABNORMAL
FASTING STATUS PATIENT QL REPORTED: ABNORMAL
GLUCOSE SERPL-MCNC: 107 MG/DL (ref 70–99)
HDLC SERPL-MCNC: 67 MG/DL
LDLC SERPL CALC-MCNC: 217 MG/DL
NONHDLC SERPL-MCNC: 235 MG/DL
POTASSIUM SERPL-SCNC: 4 MMOL/L (ref 3.4–5.3)
SODIUM SERPL-SCNC: 131 MMOL/L (ref 135–145)
TRIGL SERPL-MCNC: 89 MG/DL

## 2024-06-28 PROCEDURE — 80048 BASIC METABOLIC PNL TOTAL CA: CPT | Performed by: PATHOLOGY

## 2024-06-28 PROCEDURE — 80061 LIPID PANEL: CPT | Performed by: PATHOLOGY

## 2024-06-28 PROCEDURE — G0439 PPPS, SUBSEQ VISIT: HCPCS | Performed by: INTERNAL MEDICINE

## 2024-06-28 PROCEDURE — 36415 COLL VENOUS BLD VENIPUNCTURE: CPT | Performed by: PATHOLOGY

## 2024-06-28 RX ORDER — LOSARTAN POTASSIUM 100 MG/1
100 TABLET ORAL DAILY
Qty: 90 TABLET | Refills: 4 | Status: SHIPPED | OUTPATIENT
Start: 2024-06-28

## 2024-06-28 RX ORDER — GABAPENTIN 300 MG/1
300 CAPSULE ORAL 3 TIMES DAILY
Qty: 270 CAPSULE | Refills: 4 | Status: SHIPPED | OUTPATIENT
Start: 2024-06-28

## 2024-06-28 RX ORDER — SPIRONOLACTONE 25 MG/1
12.5 TABLET ORAL DAILY
Qty: 90 TABLET | Refills: 4 | Status: SHIPPED | OUTPATIENT
Start: 2024-06-28 | End: 2024-09-04

## 2024-06-28 RX ORDER — HYDROCHLOROTHIAZIDE 25 MG/1
25 TABLET ORAL DAILY
Qty: 90 TABLET | Refills: 4 | Status: SHIPPED | OUTPATIENT
Start: 2024-06-28

## 2024-06-28 RX ORDER — RESPIRATORY SYNCYTIAL VIRUS VACCINE 120MCG/0.5
0.5 KIT INTRAMUSCULAR ONCE
Qty: 1 EACH | Refills: 0 | Status: CANCELLED | OUTPATIENT
Start: 2024-06-28 | End: 2024-06-28

## 2024-06-28 NOTE — PROGRESS NOTES
Preventive Care Visit  [facility]  Alyssa Lopez MD, Internal Medicine  Jun 28, 2024      Assessment & Plan     Assessment & Plan     Shoulder pain  - Assessment: Pain in right shoulder after a stretch, recent shoulder replacement surgery  - Plan: Appointment with Dr. Jayme Sahu for further evaluation    Hypertension  - Assessment: Regularly checks blood pressure at home, noticed high readings. Blood pressure sometimes drops low, causing blurry vision.  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 Essential hypertension, benign  I10 BASIC METABOLIC PANEL     losartan (COZAAR) 100 MG tablet     spironolactone (ALDACTONE) 25 MG tablet      2. Acute pain of right shoulder  M25.511       3. HLD (hyperlipidemia)  E78.5 Lipid panel reflex to direct LDL Fasting      4. Edema, unspecified type  R60.9 hydrochlorothiazide (HYDRODIURIL) 25 MG tablet      5. Itching  L29.9 gabapentin (NEURONTIN) 300 MG capsule      6. Visit for screening mammogram  Z12.31 MA Screening Bilateral w/ Daniel          Counseling  Appropriate preventive services were discussed with this patient, including applicable screening as appropriate for fall prevention, nutrition, physical activity, Tobacco-use cessation, weight loss and cognition.  Checklist reviewing preventive services available has been given to the patient.  Reviewed patient's diet, addressing concerns and/or questions.   She is at risk for lack of exercise and has been provided  with information to increase physical activity for the benefit of her well-being.           Return in about 1 year (around 6/28/2025).    Subjective   Janette is a 83 year old, presenting for the following:  Physical        6/28/2024     8:31 AM   Additional Questions   Roomed by MR         Via the Health Maintenance questionnaire, the patient has reported the following services have been completed -Mammogram: [place] 1921-07-18, this information has not been sent to the abstraction team.  Health Care Directive  Patient does not have a Health Care Directive or Living Will: Patient states has Advance Directive and will bring in a copy to clinic.    HPI  History of Present Illness    - Janette Anderson, female, 83 years old  - Right shoulder replacement surgery on February 19th  - Recovery from surgery was good until a stretch caused pain, suspecting a slip  - Appointment with Dr. 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 Excellent   Feel stress (tense, anxious, or unable to sleep) Not at all            6/23/2024   Nutrition   Diet: Regular (no restrictions)            6/23/2024   Exercise   Days per week of moderate/strenous exercise 3 days   Average minutes spent exercising at this level 50 min            6/23/2024   Social Factors    Frequency of gathering with friends or relatives More than three times a week   Worry food won't last until get money to buy more No   Food not last or not have enough money for food? No   Do you have housing? (Housing is defined as stable permanent housing and does not include staying ouside in a car, in a tent, in an abandoned building, in an overnight shelter, or couch-surfing.) Yes   Are you worried about losing your housing? No   Lack of transportation? No   Unable to get utilities (heat,electricity)? No            6/23/2024   Fall Risk   Fallen 2 or more times in the past year? No    No   Trouble with walking or balance? No    No       Multiple values from one day are sorted in reverse-chronological order          6/23/2024   Activities of Daily Living- Home Safety   Needs help with the following daily activites None of the above   Safety concerns in the home None of the above            6/23/2024   Dental   Dentist two times every year? Yes            6/23/2024   Hearing Screening   Hearing concerns? None of the above            6/23/2024   Driving Risk Screening   Patient/family members have concerns about driving No            6/23/2024   General Alertness/Fatigue Screening   Have you been more tired than usual lately? No            6/23/2024   Urinary Incontinence Screening   Bothered by leaking urine in past 6 months No            6/23/2024   TB Screening   Were you born outside of the [place]? No            Today's PHQ-2 Score:       6/27/2024     9:42 AM   PHQ-2 ( 1999 [employer])   Q1: Little interest or pleasure in doing things 0   Q2: Feeling down, depressed or hopeless 0   PHQ-2 Score 0   Q1: Little interest or pleasure in doing things Not at all   Q2: Feeling down, depressed or hopeless Not at all   PHQ-2 Score 0           6/23/2024   Substance Use   Alcohol more than 3/day or more than 7/wk No   Do you have a current opioid prescription? No   How severe/bad is pain from 1 to 10? 0/10 (No Pain)   Do you  use any other substances recreationally? No    (!) PRESCRIPTION DRUGS       Multiple values from one day are sorted in reverse-chronological order     Social History     Tobacco Use    Smoking status: Never    Smokeless tobacco: Never   Vaping Use    Vaping status: Never Used   Substance Use Topics    Alcohol use: Not Currently     Alcohol/week: 1.7 - 2.5 standard drinks of alcohol     Comment: 3 glasses per week    Drug use: No           9/6/2022   LAST FHS-7 RESULTS   1st degree relative breast or ovarian cancer No   Any relative bilateral breast cancer No   Any male have breast cancer No   Any ONE woman have BOTH breast AND ovarian cancer No   Any woman with breast cancer before 50yrs No   2 or more relatives with breast AND/OR ovarian cancer No   2 or more relatives with breast AND/OR bowel cancer No                         Reviewed and updated as needed this visit by Provider                      Current providers sharing in care for this patient include:  Patient Care Team:  Alyssa Lopez MD as PCP - General (Internal Medicine)  Kuldip Yeh MD as Medical Student (Student in organized health care education/training program)  Radha Enamorado MD as Medical Student (Student in organized health care education/training program)  Shaan Root MD as MD (Dermapathology)  Alyssa Lopez MD as MD (Internal Medicine)  Nnamdi Bullard MD as MD (Colon and Rectal Surgery)  Alyssa Lopez MD as Assigned PCP  Fam Hernandez MD as MD (Dermatology)  Fam Hernandez MD as Assigned Surgical Provider  Kari Faust formerly [facility] as Pharmacist (Pharmacist)  Angelito Mandujano MD as Assigned Musculoskeletal Provider  Kari Faust formerly [facility] as Assigned [facility] Pharmacist  Gracy Goodwin PA-C as Physician Assistant    The following health maintenance items are reviewed in Epic and correct as of today:  Health Maintenance   Topic Date Due    RSV VACCINE (Pregnancy & 60+) (1 - 1-dose 60+ series)  Never done    IPV IMMUNIZATION (2 of 3 - Adult catch-up series) 10/30/2007    ZOSTER IMMUNIZATION (2 of 3) 12/31/2013    COVID-19 Vaccine (1 - 2023-24 season) Never done    MAMMO SCREENING  09/06/2023    BMP  06/16/2024    LIPID  06/16/2024    MEDICARE ANNUAL WELLNESS VISIT  06/16/2024    INFLUENZA VACCINE (Season Ended) 09/01/2024    ANNUAL REVIEW OF HM ORDERS  06/28/2025    FALL RISK ASSESSMENT  06/28/2025    DEXA  06/23/2026    DTAP/TDAP/TD IMMUNIZATION (4 - Td or Tdap) 06/08/2028    ADVANCE CARE PLANNING  06/28/2029    PHQ-2 (once per calendar year)  Completed    Pneumococcal Vaccine: 65+ Years  Completed    HPV IMMUNIZATION  Aged Out    MENINGITIS IMMUNIZATION  Aged Out    RSV MONOCLONAL ANTIBODY  Aged Out            Objective    Exam  BP (!) 163/80 (BP Location: Left arm, Patient Position: Sitting, Cuff Size: Adult Regular)   Pulse 59   Wt 62.1 kg (137 lb)   LMP  (LMP Unknown)   SpO2 99%   BMI 26.76 kg/m     Estimated body mass index is 26.76 kg/m  as calculated from the following:    Height as of 6/6/24: 1.524 m (5').    Weight as of this encounter: 62.1 kg (137 lb).    Physical Exam          6/28/2024   Mini Cog   Clock Draw Score 2 Normal   3 Item Recall 3 objects recalled   Mini Cog Total Score 5                 Signed Electronically by: Alyssa Lopez MD

## 2024-07-01 ENCOUNTER — ANCILLARY PROCEDURE (OUTPATIENT)
Dept: GENERAL RADIOLOGY | Facility: CLINIC | Age: 84
End: 2024-07-01
Attending: ORTHOPAEDIC SURGERY
Payer: COMMERCIAL

## 2024-07-01 ENCOUNTER — OFFICE VISIT (OUTPATIENT)
Dept: ORTHOPEDICS | Facility: CLINIC | Age: 84
End: 2024-07-01
Payer: COMMERCIAL

## 2024-07-01 VITALS — HEART RATE: 58 BPM | OXYGEN SATURATION: 99 % | DIASTOLIC BLOOD PRESSURE: 75 MMHG | SYSTOLIC BLOOD PRESSURE: 152 MMHG

## 2024-07-01 DIAGNOSIS — G89.18 ACUTE POSTOPERATIVE PAIN OF RIGHT SHOULDER: Primary | ICD-10-CM

## 2024-07-01 DIAGNOSIS — G89.18 ACUTE POSTOPERATIVE PAIN OF RIGHT SHOULDER: ICD-10-CM

## 2024-07-01 DIAGNOSIS — Z96.611 STATUS POST TOTAL SHOULDER ARTHROPLASTY, RIGHT: ICD-10-CM

## 2024-07-01 DIAGNOSIS — M25.511 ACUTE POSTOPERATIVE PAIN OF RIGHT SHOULDER: ICD-10-CM

## 2024-07-01 DIAGNOSIS — M25.511 ACUTE POSTOPERATIVE PAIN OF RIGHT SHOULDER: Primary | ICD-10-CM

## 2024-07-01 PROCEDURE — 73030 X-RAY EXAM OF SHOULDER: CPT | Mod: TC | Performed by: RADIOLOGY

## 2024-07-01 PROCEDURE — 99213 OFFICE O/P EST LOW 20 MIN: CPT | Performed by: ORTHOPAEDIC SURGERY

## 2024-07-01 ASSESSMENT — PAIN SCALES - GENERAL: PAINLEVEL: MODERATE PAIN (5)

## 2024-07-01 NOTE — LETTER
7/1/2024      Janette Anderson  [address]  [place] [address]      Dear Colleague,    Thank you for referring your patient, Janette Anderson, to the [facility]. Please see a copy of my visit note below.    SUBJECTIVE:  Janette Anderson is a 83 year old female who is seen in consult at the request of Alyssa Lopez MD for right shoulder pain.  She has had issues with this since following right reverse total shoulder arthroplasty done at [facility]. 'CYN TEMPLE, ZOILA PARKER 2/19/24    HPI: Right shoulder replacement surgery on February 19th  - Recovery from surgery was good until a stretch caused pain  She was reaching up for a lamp switch. Had sudden pain.     Present symptoms: pain anterior shoulder  Feels some new lumps under the skin anteriorly  Some pain down arm  Range of motion about the same as it was before the episode..  Trouble sleeping laying flat now.     Op note documents biceps tenodesis to the pec tendon. Subscap was intact, and taken down for the approach, and repaired.     Treatment up to this point: nsaids, tylenol    Past Medical History:   Past Medical History:   Diagnosis Date     HLD (hyperlipidaemia)      HTN (hypertension)      Osteoporosis      Primary osteoarthritis of right knee      Past Surgical History:   Past Surgical History:   Procedure Laterality Date     ADENOIDECTOMY       BUNIONECTOMY       CARPAL TUNNEL RELEASE RT/LT       meniscal tear knee Right 12/2020     ROTATOR CUFF REPAIR RT/LT  2/2006     SHOULDER SURGERY      impingment     TONSILLECTOMY       Family History:   Family History   Problem Relation Age of Onset     Coronary Artery Disease Mother      Hypertension Mother      Cerebrovascular Disease Mother         60-70's     Heart Disease Father         valve replacement in 80's     Cancer No family hx of         no skin cancer     Social History:   Social History     Tobacco Use     Smoking status: Never     Smokeless tobacco: Never    Substance Use Topics     Alcohol use: Not Currently     Alcohol/week: 1.7 - 2.5 standard drinks of alcohol     Comment: 3 glasses per week       Review of Systems:  Constitutional:  NEGATIVE for fever, chills, change in weight  Integumentary/Skin:  NEGATIVE for worrisome rashes, moles or lesions  Eyes:  NEGATIVE for vision changes or irritation  ENT/Mouth:  NEGATIVE for ear, mouth and throat problems  Resp:  NEGATIVE for significant cough or SOB  Breast:  NEGATIVE for masses, tenderness or discharge  CV:  NEGATIVE for chest pain, palpitations or peripheral edema  GI:  NEGATIVE for nausea, abdominal pain, heartburn, or change in bowel habits  :  Negative   Musculoskeletal:  See HPI above  Neuro:  NEGATIVE for weakness, dizziness or paresthesias  Endocrine:  NEGATIVE for temperature intolerance, skin/hair changes  Heme/allergy/immune:  NEGATIVE for bleeding problems  Psychiatric:  NEGATIVE for changes in mood or affect    OBJECTIVE:  Physical Exam:  BP (!) 152/75 (BP Location: Left arm)   Pulse 58   LMP  (LMP Unknown)   SpO2 99%   General Appearance: healthy, alert and in no distress   Skin: no suspicious lesions or rashes  Neuro: Normal strength and tone, mentation intact and speech normal  Vascular: good pulses, and cappillary refill   Lymph: no lymphadenopathy   Psych:  mentation appears normal and affect normal/bright  Resp: no increased work of breathing      Right Shoulder Exam:  Shoulder Inspection: she has a priya biceps deformity, which she says has been there since the surgery.   Tender: anterior shoulder, along the biceps groove.  Some small nodules palpable under the skin, likely suture knots.  No redness, fluctuance, or bogginess.   Range of Motion:   Active: forward flexion: 80, internal rotation: back pocket   Passive: forward flexion: 100, external rotation: 50  Strength: forward flexion: pretty good. 5-/5, external rotation: 5-/5, Liftoff: Unable, belly press weak    X-rays:  Obtained  today,7/1/2024 are reviewed in the office with the patient:  Components in good alignment, well fixed. No complications. No scapular notching.      ASSESSMENT:  Encounter Diagnoses   Name Primary?     Acute postoperative pain of right shoulder Yes     Status post total shoulder arthroplasty, right       I suspect biceps or subscap repair failure     PLAN:  I suggested physical therapy, I reassured her that her function should still be pretty good, and the pain should resolve  Reminded her about antibiotics for dental procedures for 1 year  Topical nsaids and deep massage of the area may help the pain as well..    Return to clinic: as needed     GIANNA Schultz MD  Dept. Orthopedic Surgery  [facility]      Again, thank you for allowing me to participate in the care of your patient.        Sincerely,        David Schultz MD

## 2024-07-02 ENCOUNTER — OFFICE VISIT (OUTPATIENT)
Dept: DERMATOLOGY | Facility: CLINIC | Age: 84
End: 2024-07-02
Payer: COMMERCIAL

## 2024-07-02 DIAGNOSIS — L29.9 PRURITUS: ICD-10-CM

## 2024-07-02 DIAGNOSIS — L23.89 ALLERGIC CONTACT DERMATITIS DUE TO OTHER AGENTS: Primary | ICD-10-CM

## 2024-07-02 DIAGNOSIS — L20.89 OTHER ATOPIC DERMATITIS: ICD-10-CM

## 2024-07-02 PROCEDURE — 99214 OFFICE O/P EST MOD 30 MIN: CPT | Performed by: STUDENT IN AN ORGANIZED HEALTH CARE EDUCATION/TRAINING PROGRAM

## 2024-07-02 PROCEDURE — G2211 COMPLEX E/M VISIT ADD ON: HCPCS | Performed by: STUDENT IN AN ORGANIZED HEALTH CARE EDUCATION/TRAINING PROGRAM

## 2024-07-02 RX ORDER — DESONIDE 0.5 MG/G
CREAM TOPICAL 2 TIMES DAILY
Qty: 60 G | Refills: 3 | Status: SHIPPED | OUTPATIENT
Start: 2024-07-02 | End: 2024-08-16

## 2024-07-02 ASSESSMENT — PAIN SCALES - GENERAL: PAINLEVEL: NO PAIN (0)

## 2024-07-02 NOTE — NURSING NOTE
Dermatology Rooming Note    Janette Anderson's goals for this visit include:   Chief Complaint   Patient presents with    Derm Problem     Atopic dermatitis recheck- continues on dupixent which has helped resolve her atopic derm. She takes advil as needed.    Spot of concern on her left temple- it is itchy and she would like it checked out     Tariq QUINTANA CMA

## 2024-07-02 NOTE — LETTER
7/2/2024       RE: Janette Anderson  [address] [place] [address]     Dear Colleague,    Thank you for referring your patient, Janette Anderson, to the [facility] [place] at [facility]. Please see a copy of my visit note below.    [facility] Dermatology Note  Encounter Date: Jul 2, 2024  Office Visit     Dermatology Problem List:  1. Atopic dermatitis  - Current tx: Dupixent, desonide 0.05% cream bid prn    ____________________________________________    Assessment & Plan:     # Atopic dermatitis, chronic, improved and near/at-goal. (Continuing focal point for medical care services related to serious/complex condition)  #Pruritus   Patient with significant improvement on dupixent with mild to no SE endorsed. Patient would like to continue taking this medication given significant improvement to quality-of-life. For breakthrough eczematous lesions, patient was instructed to use desonide cream.  - Continue dupixent as prescribed  - Apply desonide 0.05% cream bid prn for breakthrough lesions    # Seborheic keratosis, left scalp.  - Provided reassurance of its benign nature  - Offered cryotherapy, patient declined    Follow-up: 1 year(s) in-person, or earlier for new or changing lesions    Staff and Medical Student:     Mary Puri on 7/2/2024 at 9:55 AM    Staffed with: Dr. David MD  ____________________________________________    CC: No chief complaint on file.    HPI:  Ms. Janette Anderson is a(n) 83 year old female who presents today as a return patient for atopic dermatitis. Endorses infrequent episodes of itchy burning sensation in torso. Has also noticed a spot on her left temple and scalp that have increased in size and associated with occasional itching. Says she was previously interested in going off of dupixent but no longer wants to do that.    Patient is otherwise feeling well, without additional skin  concerns.    Labs:  None reviewed.    Physical Exam:  Vitals: LMP  (LMP Unknown)   SKIN: Focused examination of face and scalp was performed.  - Left temple scaly red papule  - Left scalp waxy tan papule  - No other lesions of concern on areas examined.     Medications:  Current Outpatient Medications   Medication Sig Dispense Refill     dupilumab (DUPIXENT) 300 MG/2ML prefilled pen Inject 2 mLs (300 mg) Subcutaneous every 14 days 4 mL 5     gabapentin (NEURONTIN) 300 MG capsule Take 1 capsule (300 mg) by mouth 3 times daily 270 capsule 4     glucosamine 500 MG CAPS capsule Take 500 mg by mouth daily Sometimes 2-3       hydrochlorothiazide (HYDRODIURIL) 25 MG tablet Take 1 tablet (25 mg) by mouth daily 90 tablet 4     ibuprofen (ADVIL/MOTRIN) 200 MG tablet Take 400 mg by mouth every 4 hours as needed for mild pain or moderate pain       losartan (COZAAR) 100 MG tablet Take 1 tablet (100 mg) by mouth daily 90 tablet 4     multivitamin (OCUVITE) TABS tablet Take 1 tablet by mouth daily       spironolactone (ALDACTONE) 25 MG tablet Take 0.5 tablets (12.5 mg) by mouth daily 90 tablet 4     No current facility-administered medications for this visit.      Past Medical History:   Patient Active Problem List   Diagnosis     Essential hypertension, benign     HLD (hyperlipidemia)     Inflamed seborrheic keratosis     Osteopenia     Past Medical History:   Diagnosis Date     HLD (hyperlipidaemia)      HTN (hypertension)      Osteoporosis      Primary osteoarthritis of right knee         CC Referred Self, MD  No address on file on close of this encounter.      Attestation with edits by Fam Hernandez MD at 7/2/2024 10:55 AM:  I have personally examined this patient and agree with the medical student's documentation and plan of care. I have reviewed and amended the medical student's note as necessary.The documentation accurately reflects my clinical observations, diagnoses, treatment and follow-up plans.     Fam Hernandez,  MD  Dermatology Staff      Again, thank you for allowing me to participate in the care of your patient.      Sincerely,    Fam Hernandez MD

## 2024-07-02 NOTE — PROGRESS NOTES
[facility] Dermatology Note  Encounter Date: Jul 2, 2024  Office Visit     Dermatology Problem List:  1. Atopic dermatitis  - Current tx: Dupixent, desonide 0.05% cream bid prn    ____________________________________________    Assessment & Plan:     # Atopic dermatitis, chronic, improved and near/at-goal. (Continuing focal point for medical care services related to serious/complex condition)  #Pruritus   Patient with significant improvement on dupixent with mild to no SE endorsed. Patient would like to continue taking this medication given significant improvement to quality-of-life. For breakthrough eczematous lesions, patient was instructed to use desonide cream.  - Continue dupixent as prescribed  - Apply desonide 0.05% cream bid prn for breakthrough lesions    # Seborheic keratosis, left scalp.  - Provided reassurance of its benign nature  - Offered cryotherapy, patient declined    Follow-up: 1 year(s) in-person, or earlier for new or changing lesions    Staff and Medical Student:     Mary Puri on 7/2/2024 at 9:55 AM    Staffed with: Dr. David MD  ____________________________________________    CC: No chief complaint on file.    HPI:  Ms. Janette Anderson is a(n) 83 year old female who presents today as a return patient for atopic dermatitis. Endorses infrequent episodes of itchy burning sensation in torso. Has also noticed a spot on her left temple and scalp that have increased in size and associated with occasional itching. Says she was previously interested in going off of dupixent but no longer wants to do that.    Patient is otherwise feeling well, without additional skin concerns.    Labs:  None reviewed.    Physical Exam:  Vitals: LMP  (LMP Unknown)   SKIN: Focused examination of face and scalp was performed.  - Left temple scaly red papule  - Left scalp waxy tan papule  - No other lesions of concern on areas examined.     Medications:  Current Outpatient Medications    Medication Sig Dispense Refill    dupilumab (DUPIXENT) 300 MG/2ML prefilled pen Inject 2 mLs (300 mg) Subcutaneous every 14 days 4 mL 5    gabapentin (NEURONTIN) 300 MG capsule Take 1 capsule (300 mg) by mouth 3 times daily 270 capsule 4    glucosamine 500 MG CAPS capsule Take 500 mg by mouth daily Sometimes 2-3      hydrochlorothiazide (HYDRODIURIL) 25 MG tablet Take 1 tablet (25 mg) by mouth daily 90 tablet 4    ibuprofen (ADVIL/MOTRIN) 200 MG tablet Take 400 mg by mouth every 4 hours as needed for mild pain or moderate pain      losartan (COZAAR) 100 MG tablet Take 1 tablet (100 mg) by mouth daily 90 tablet 4    multivitamin (OCUVITE) TABS tablet Take 1 tablet by mouth daily      spironolactone (ALDACTONE) 25 MG tablet Take 0.5 tablets (12.5 mg) by mouth daily 90 tablet 4     No current facility-administered medications for this visit.      Past Medical History:   Patient Active Problem List   Diagnosis    Essential hypertension, benign    HLD (hyperlipidemia)    Inflamed seborrheic keratosis    Osteopenia     Past Medical History:   Diagnosis Date    HLD (hyperlipidaemia)     HTN (hypertension)     Osteoporosis     Primary osteoarthritis of right knee         CC Referred Self, MD  No address on file on close of this encounter.

## 2024-07-03 DIAGNOSIS — I10 ESSENTIAL HYPERTENSION, BENIGN: ICD-10-CM

## 2024-07-08 ENCOUNTER — TELEPHONE (OUTPATIENT)
Dept: DERMATOLOGY | Facility: CLINIC | Age: 84
End: 2024-07-08
Payer: COMMERCIAL

## 2024-07-08 NOTE — TELEPHONE ENCOUNTER
Prior Authorization Retail Medication Request    Medication/Dose: Desonide (DESOWEN) 0.05 % external cream  Diagnosis and ICD code (if different than what is on RX):    New/renewal/insurance change PA/secondary ins. PA:  Previously Tried and Failed:    Rationale:      Insurance   Primary: [employer]   Insurance ID:  [id number]     Pharmacy Information (if different than what is on RX)  Name:    [id number] [place], [place] - [address]     Phone:  [phone]   Fax:[phone]

## 2024-07-09 RX ORDER — LOSARTAN POTASSIUM 100 MG/1
100 TABLET ORAL DAILY
Qty: 90 TABLET | Refills: 4 | OUTPATIENT
Start: 2024-07-09

## 2024-07-11 NOTE — TELEPHONE ENCOUNTER
Retail Pharmacy Prior Authorization Team   Phone: [phone]    PA Initiation    Medication: Desonide (DESOWEN) 0.05 % external cream  Insurance Company: [employer] ([facility]) - Phone [phone] Fax [phone]  Pharmacy Filling the Rx: [id number] - [place], [place] - [address]  Filling Pharmacy Phone: [phone]  Filling Pharmacy Fax: [phone]  Start Date: 7/11/2024

## 2024-07-15 ENCOUNTER — MYC MEDICAL ADVICE (OUTPATIENT)
Dept: DERMATOLOGY | Facility: CLINIC | Age: 84
End: 2024-07-15
Payer: COMMERCIAL

## 2024-07-15 DIAGNOSIS — R21 RASH AND NONSPECIFIC SKIN ERUPTION: Primary | ICD-10-CM

## 2024-07-16 RX ORDER — DESONIDE 0.5 MG/G
OINTMENT TOPICAL 2 TIMES DAILY
Qty: 60 G | Refills: 4 | Status: SHIPPED | OUTPATIENT
Start: 2024-07-16 | End: 2024-08-16

## 2024-07-16 NOTE — TELEPHONE ENCOUNTER
Patient already informed of prescription change in 7/16/2024 mychart encounter. Closing this encounter.    Naif Hebert, EMT

## 2024-07-16 NOTE — TELEPHONE ENCOUNTER
Insurance states PA was denied.  PA team did not receive the determination fax.  Insurance rep is faxing.  I will document when received.

## 2024-07-16 NOTE — TELEPHONE ENCOUNTER
PRIOR AUTHORIZATION DENIED     Medication: Desonide (DESOWEN) 0.05 % external cream - DENIED     Denial Date: 7/11/2024     Denial Rational: INSURANCE STATES PATIENT MUST TRY/FAIL TWO FORMULARY ALTERNATIVES -           Appeal Information:  IF PATIENT IS UNABLE TO TRY/FAIL ALTERNATIVE(S) PLEASE SUPPLY PA TEAM WITH A LETTER OF MEDICAL NECESSITY WITH CLINICAL REASON.

## 2024-08-12 DIAGNOSIS — L20.89 OTHER ATOPIC DERMATITIS: Primary | ICD-10-CM

## 2024-08-12 RX ORDER — FLUOCINONIDE 0.5 MG/G
CREAM TOPICAL 2 TIMES DAILY
Qty: 30 G | Refills: 4 | Status: SHIPPED | OUTPATIENT
Start: 2024-08-12

## 2024-08-15 DIAGNOSIS — L20.89 OTHER ATOPIC DERMATITIS: ICD-10-CM

## 2024-08-16 ENCOUNTER — VIRTUAL VISIT (OUTPATIENT)
Dept: DERMATOLOGY | Facility: CLINIC | Age: 84
End: 2024-08-16
Attending: PHARMACIST
Payer: COMMERCIAL

## 2024-08-16 DIAGNOSIS — L50.1 CHRONIC IDIOPATHIC URTICARIA: ICD-10-CM

## 2024-08-16 DIAGNOSIS — I10 ESSENTIAL HYPERTENSION, BENIGN: ICD-10-CM

## 2024-08-16 DIAGNOSIS — R52 PAIN: ICD-10-CM

## 2024-08-16 DIAGNOSIS — L20.89 OTHER ATOPIC DERMATITIS: Primary | ICD-10-CM

## 2024-08-16 DIAGNOSIS — Z78.9 TAKES DIETARY SUPPLEMENTS: ICD-10-CM

## 2024-08-16 NOTE — PROGRESS NOTES
Medication Therapy Management (MTM) Encounter    ASSESSMENT:                            Medication Adherence/Access: No issues identified.    Atopic dermatitis & chronic urticaria, dermatographism: Stable, controlled.     Hypertension: Defer to primary care provider for continued management of condition. Would benefit from continued home blood pressure monitoring for continued assessment of control of blood pressure.    Pain: Stable, controlled.     Supplements: Stable, controlled.     PLAN:                            Continue current regimen as prescribed.   Consider receiving the following vaccination(s): shingles (Shingrix; 2 dose-series) and RSV.    Follow-up: Hernandez 7/8/25; MTM 6/6/25    SUBJECTIVE/OBJECTIVE:                          Janette Anderson is a 83 year old female called for a follow-up visit.      Reason for visit: Dupixent check in.    Allergies/ADRs: Reviewed in chart.  Past Medical History: Reviewed in chart.  Tobacco: She reports that she has never smoked. She has never used smokeless tobacco.  Alcohol: Reviewed in chart.    Medication Adherence/Access: no issues reported.    Atopic dermatitis & chronic urticaria, dermatographism:   - Dupixent (dupilumab) 300 mg every 14 days (started early October 2023).  - Gabapentin 300 mg three times daily.  - Vanicream topically multiple times daily as needed   - Fluocinonide 0.05% cream twice daily.     08/16/24: Patient notes psoriasis-like symptoms on arms and thighs, although started fluocinonide cream twice daily and took it away immediately. Was [place] Plans to return to [place] mid-October. Patient reports current control of symptoms.    01/08/24: Patient denies adverse effects including conjunctivitis. Has noticed an increase in muscle and joint pain lately although contributes all pains to issues that preexisted Dupixent use (right knee, left knee, right shoulder, left shoulder). Patient is  no longer using any topicals and has reported improvement in her symptoms since starting the Dupixent; has supply of topicals at home if ever needed. Does state that her skin is feeling thin, and she will bruise or cut easily if she hits her skin. Last Dupixent injection was given on Saturday. Has tried to reduce her gabapentin dosing (for 1-2 days) thinking she was using that to reduce her dermatologic symptoms. Notices an increase in tingling when reducing her dose. Open to discussing with the prescribing provider, her primary care doctor, since she is interested in reducing this dose if possible. Considers that she was maybe started it for neuropathy but she is unsure. Previously wondered if she would need to be on Dupixent long term since she knows her symptoms are flared with stress and now notes her stress is gone (she recently wrapped up a 3 year long legal mix). Is happy with her current control and hopes to continue Dupixent.      Specialist: Dr. Fam Hernandez MD, Dermatology. Last visit on 7/2/24.   # Atopic dermatitis, chronic, improved and near/at-goal. (Continuing focal point for medical care services related to serious/complex condition)  #Pruritus   Patient with significant improvement on dupixent with mild to no SE endorsed. Patient would like to continue taking this medication given significant improvement to quality-of-life. For breakthrough eczematous lesions, patient was instructed to use desonide cream.  - Continue dupixent as prescribed  - Apply desonide 0.05% cream bid prn for breakthrough lesions  # Seborheic keratosis, left scalp.  - Provided reassurance of its benign nature  - Offered cryotherapy, patient declined  Follow-up: 1 year(s) in-person, or earlier for new or changing lesions    Previous treatment:   - Allegra x 4 tablets daily was not effective, therefore discontinued.   - Triamcinolone was not effective topically, therefore switched to clobetasol, which is more effective for  her although still ended up discontinuing due to lack of adequate effect.   - Betamethasone lotion is only partially effective.   - Gabapentin has been helpful for her to reduce itching/flares.     Immunization History   Covid-19 vaccine  Consider vaccine in 2024-25 season   Influenza (annual) Consider vaccine in 2024-25 season, avoid live FluMist   Tetanus/Tdap Up-to-date   Pneumococcal  Prevnar-13: 5/25/17, 5/3/16  Pneumovax-23: 12/18/08   Prevnar-20: none Complete   Shingrix Due to receive   RSV (only for ? 60 years old)  Due to receive   All patients on biologics should avoid live vaccines (varicella/VZV, intranasal influenza, MMR, or yellow fever vaccine (if traveling))       Hypertension:   - Losartan 100 mg once daily.  - Hydrochlorothiazide 25 mg once daily.  - Spironolactone 25 mg x one-half tablet (12.5 mg) once daily.  - Atorvastatin 20 mg once daily.      08/16/24: Was on a vacation in [place] for a while so has not taken blood pressure it in a while. Does feel her pressures have been more stable. Had one blood pressure of 128-130/70s. Pain resolved from shoulder replacement.     1/8/24: Patient reports no current medication side effects. Patient self-monitors blood pressure. Home BP monitoring 11/27/23 was 124/80 (55), 12/13/23 after being busy was 152/81 (55), 25 minutes later the same day was 107/68 (68), 12/18 130/75 (64).    Pain:    - Glucosamine 500 mg x 2 (1000 mg) once daily.  - Ibuprofen 200 mg x 2 every 4 hours as needed.    Patient feels that current therapy is effective. Patient reports no current medication side effects. Notes Tylenol is not effective for her.    Supplements:   - Multivitamin once daily.    No reported issues at this time.       Today's Vitals: LMP  (LMP Unknown)   ----------------  I spent 20 minutes with this patient today. All changes were made via collaborative practice agreement with Dr. Fam Hernandez MD. A copy of the visit note was provided to the patient's  provider(s).    A summary of these recommendations was sent via [employer].    Kari Faust, Pharm.D.  Medication Therapy Management Pharmacist   [facility]    Telemedicine Visit Details  Type of service:  Telephone visit  Start Time: 9:00 AM  End Time: 9:20 AM     Medication Therapy Recommendations  No medication therapy recommendations to display

## 2024-08-16 NOTE — Clinical Note
8/16/2024       RE: Janette Anderson  [address]  [place] [address]     Dear Colleague,    Thank you for referring your patient, Janette Anderson, to the [facility] RHEUMATOLOGY CLINIC [place] at [facility]. Please see a copy of my visit note below.    Medication Therapy Management (MTM) Encounter    ASSESSMENT:                            Medication Adherence/Access: {adherencechoices:456928}.    ***: ***    Referred, Arrived, CC, Nicotine/Allergies, Meds, Diagnoses, Carrier, Follow-up, Remind me, Remove letter, AVS/MyChart, Flag, LOS, Note, MTPs, Orders, Route    PLAN:                            ***.  Consider receiving the following vaccination(s) now: {Vaccines:773797}.  Consider receiving the following vaccination(s) once you start your ***: {Vaccines:953056}.     Follow-up: ***    SUBJECTIVE/OBJECTIVE:                          Janette Anderson is a 83 year old female called for a follow-up visit.      Reason for visit: Dupixent check in.    Allergies/ADRs: Reviewed in chart.  Past Medical History: Reviewed in chart.  Tobacco: She reports that she has never smoked. She has never used smokeless tobacco.  Alcohol: Reviewed in chart.    Medication Adherence/Access: no issues reported.    Atopic dermatitis & chronic urticaria, dermatographism:   - Dupixent (dupilumab) 300 mg every 14 days (started early October 2023).  - Gabapentin 300 mg three times daily.  - Vanicream topically multiple times daily as needed   - Fluocinonide 0.05% cream twice daily. Just finished clearing up new rash.      08/16/24: *** arms and thighs, psoriasis, fluocinonide took away immediately.     1/8/24: Patient denies adverse effects including conjunctivitis. Has noticed an increase in muscle and joint pain lately although contributes all pains to issues that preexisted Dupixent use (right knee, left knee, right shoulder, left shoulder). Patient is no longer using any topicals  and has reported improvement in her symptoms since starting the Dupixent; has supply of topicals at home if ever needed. Does state that her skin is feeling thin, and she will bruise or cut easily if she hits her skin. Last Dupixent injection was given on Saturday. Has tried to reduce her gabapentin dosing (for 1-2 days) thinking she was using that to reduce her dermatologic symptoms. Notices an increase in tingling when reducing her dose. Open to discussing with the prescribing provider, her primary care doctor, since she is interested in reducing this dose if possible. Considers that she was maybe started it for neuropathy but she is unsure. Previously wondered if she would need to be on Dupixent long term since she knows her symptoms are flared with stress and now notes her stress is gone (she recently wrapped up a 3 year long legal mix). Is happy with her current control and hopes to continue Dupixent.      - Betamethasone 0.05% lotion: apply on scalp for itching as needed. Didn't work.  - Clobetasol 0.05% cream: apply to itching spots twice daily as needed.  didn't work.   - Desonide 0.05% cream and ointment twice daily. -- [place] - will go back mid October    Coni rickie [place] etc.     Specialist: Dr. Fam Hernandez MD, Dermatology. Last visit on 7/2/24.   # Atopic dermatitis, chronic, improved and near/at-goal. (Continuing focal point for medical care services related to serious/complex condition)  #Pruritus   Patient with significant improvement on dupixent with mild to no SE endorsed. Patient would like to continue taking this medication given significant improvement to quality-of-life. For breakthrough eczematous lesions, patient was instructed to use desonide cream.  - Continue dupixent as prescribed  - Apply desonide 0.05% cream bid prn for breakthrough lesions  # Seborheic keratosis, left scalp.  - Provided reassurance of its benign nature  - Offered cryotherapy, patient declined  Follow-up:  1 year(s) in-person, or earlier for new or changing lesions    Previous treatment:   - Allegra x 4 tablets daily was not effective, therefore discontinued.   - Triamcinolone was not effective topically, therefore switched to clobetasol, which is more effective for her.   - Betamethasone lotion is only partially effective.   - Gabapentin has been helpful for her to reduce itching/flares.     Immunization History   Covid-19 vaccine (3175-0567 version)  Due to receive   Influenza (annual, 2978-0317) Due to receive, avoid live FluMist   Pneumococcal  Prevnar-13: 5/25/17, 5/3/16  Pneumovax-23: 12/18/08   Prevnar-20: none Up-to-date   Tetanus/Tdap  Up-to-date   Shingrix Due to receive   RSV (only for ? 60 years old)  Due to receive   All patients on biologics should avoid live vaccines (varicella/VZV, intranasal influenza, MMR, or yellow fever vaccine (if traveling))        Hypertension:   - Losartan 100 mg once daily.  - Hydrochlorothiazide 25 mg once daily.  - Spironolactone 25 mg x one-half tablet (12.5 mg) once daily.  - Atorvastatin 20 mg once daily.      08/16/24: Was on a vacation in [place] for a while so havent taken it in a while. She seemed to be more level and higher than normal. Had one blood pross 128-130/70s. shoulder replaced; pain resolved.     1/8/24: Patient reports no current medication side effects. Patient self-monitors blood pressure. Home BP monitoring 11/27/23 was 124/80 (55), 12/13/23 after being busy was 152/81 (55), 25 minutes later the same day was 107/68 (68), 12/18 130/75 (64).      - Glucosamine 500 mg *** -- helps?   - Advil and ibuprofen two in the morning and two at night - tylenol doesn't work.     - Ibuprofen 200 mg x 2 every 4 hours as needed.  - Multivitamin once daily.     Today's Vitals: LMP  (LMP Unknown)   ----------------  I spent *** minutes with this patient today. All changes were made via collaborative practice agreement with {Dermatology Providers:603185}. A copy of the  visit note was provided to the patient's provider(s).    A summary of these recommendations was sent via [employer].    Kari Faust, Pharm.D.  Medication Therapy Management Pharmacist   [facility] Dermatology    Telemedicine Visit Details  Type of service:  Telephone visit  Start Time: {video/phone visit start time:152948}  End Time: {video/phone visit end time:152948}     Medication Therapy Recommendations  No medication therapy recommendations to display        Again, thank you for allowing me to participate in the care of your patient.      Sincerely,    Kari Faust [facility]

## 2024-08-19 NOTE — TELEPHONE ENCOUNTER
Established with MTM. Refills for Dupixent authorized.     Kari Faust, Pharm.D.  Medication Therapy Management Pharmacist   [facility]

## 2024-08-31 DIAGNOSIS — I10 ESSENTIAL HYPERTENSION, BENIGN: ICD-10-CM

## 2024-09-03 NOTE — PATIENT INSTRUCTIONS
"Recommendations from today's MTM visit:                                                    MTM (medication therapy management) is a service provided by a clinical pharmacist designed to help you get the most of out of your medicines.      Continue current regimen as prescribed.   Consider receiving the following vaccination(s): shingles (Shingrix; 2 dose-series) and RSV.    Follow-up: Hernandez 7/8/25; MTM 6/6/25    It was great speaking with you today.  I value your experience and would be very thankful for your time in providing feedback in our clinic survey. In the next few days, you may receive an email or text message from [facility] [employer] with a link to a survey related to your  clinical pharmacist.\"     To schedule another MTM appointment, please call the clinic directly or you may call the MTM scheduling line at [phone].    My Clinical Pharmacist's contact information:                                                      Please feel free to contact me with any questions or concerns you have.      Kari Faust, Pharm.D.  Medication Therapy Management Pharmacist   [facility] Dermatology  MTM Scheduling Line: [phone]    "

## 2024-09-04 RX ORDER — SPIRONOLACTONE 25 MG/1
25 TABLET ORAL DAILY
Qty: 90 TABLET | Refills: 3 | Status: SHIPPED | OUTPATIENT
Start: 2024-09-04

## 2024-09-04 NOTE — TELEPHONE ENCOUNTER
LVD:  6/28/2024  [facility] Internal Medicine Alyssa Schmitt MD  Internal Medicine   ALDACTONE  Refilled per protocol.

## 2024-11-08 ENCOUNTER — MYC MEDICAL ADVICE (OUTPATIENT)
Dept: DERMATOLOGY | Facility: CLINIC | Age: 84
End: 2024-11-08
Payer: COMMERCIAL

## 2024-11-08 DIAGNOSIS — L20.89 OTHER ATOPIC DERMATITIS: ICD-10-CM

## 2024-11-11 NOTE — TELEPHONE ENCOUNTER
Patient followed by [facility] pharmacy team. Appropriate to authorize Dupixent refills at this time and continue following-up with MT and dermatology providers going forward.     Kari Faust [facility]

## 2024-12-19 ENCOUNTER — PATIENT OUTREACH (OUTPATIENT)
Dept: CARE COORDINATION | Facility: CLINIC | Age: 84
End: 2024-12-19
Payer: COMMERCIAL

## 2024-12-28 DIAGNOSIS — E78.5 HYPERLIPIDEMIA LDL GOAL <100: ICD-10-CM

## 2025-01-02 RX ORDER — ATORVASTATIN CALCIUM 20 MG/1
20 TABLET, FILM COATED ORAL DAILY
Qty: 90 TABLET | Refills: 4 | OUTPATIENT
Start: 2025-01-02

## 2025-01-09 ENCOUNTER — MYC MEDICAL ADVICE (OUTPATIENT)
Dept: DERMATOLOGY | Facility: CLINIC | Age: 85
End: 2025-01-09
Payer: COMMERCIAL

## 2025-01-09 DIAGNOSIS — L20.89 OTHER ATOPIC DERMATITIS: ICD-10-CM

## 2025-01-11 ENCOUNTER — HEALTH MAINTENANCE LETTER (OUTPATIENT)
Age: 85
End: 2025-01-11

## 2025-01-14 DIAGNOSIS — L20.89 OTHER ATOPIC DERMATITIS: ICD-10-CM

## 2025-01-14 NOTE — PROGRESS NOTES
Resending order in MTM encounter; see note from 1/9/25 Tink encounter:     Patient reports no longer qualifying for Dupixent PAP. Sending order forward to [facility] for liaison team to assess which specialty pharmacy her insurance approves / consider FPAP if patient agrees (message sent via [employer] to see if patient interested/income assessment). Will route to liaison team as FYI.      Kari Faust RP

## 2025-01-14 NOTE — TELEPHONE ENCOUNTER
Patient reports no longer qualifying for Dupixent PAP. Sending order forward to [facility] for liaison team to assess which specialty pharmacy her insurance approves / consider FPAP if patient agrees (message sent via [employer] to see if patient interested/income assessment). Will route to liaison team as DOMINIQUE.     Kari Faust [facility]

## 2025-07-01 DIAGNOSIS — I10 ESSENTIAL HYPERTENSION, BENIGN: ICD-10-CM

## 2025-07-01 DIAGNOSIS — R60.9 EDEMA, UNSPECIFIED TYPE: ICD-10-CM

## 2025-07-07 ENCOUNTER — MYC REFILL (OUTPATIENT)
Dept: INTERNAL MEDICINE | Facility: CLINIC | Age: 85
End: 2025-07-07
Payer: COMMERCIAL

## 2025-07-07 DIAGNOSIS — I10 ESSENTIAL HYPERTENSION, BENIGN: ICD-10-CM

## 2025-07-07 RX ORDER — HYDROCHLOROTHIAZIDE 25 MG/1
25 TABLET ORAL DAILY
Qty: 90 TABLET | Refills: 4 | OUTPATIENT
Start: 2025-07-07

## 2025-07-07 RX ORDER — LOSARTAN POTASSIUM 100 MG/1
100 TABLET ORAL DAILY
Qty: 90 TABLET | Refills: 4 | OUTPATIENT
Start: 2025-07-07

## 2025-07-08 ENCOUNTER — OFFICE VISIT (OUTPATIENT)
Dept: DERMATOLOGY | Facility: CLINIC | Age: 85
End: 2025-07-08
Attending: STUDENT IN AN ORGANIZED HEALTH CARE EDUCATION/TRAINING PROGRAM
Payer: COMMERCIAL

## 2025-07-08 DIAGNOSIS — L29.9 PRURITUS: Primary | ICD-10-CM

## 2025-07-08 DIAGNOSIS — L20.89 OTHER ATOPIC DERMATITIS: ICD-10-CM

## 2025-07-08 ASSESSMENT — PAIN SCALES - GENERAL: PAINLEVEL_OUTOF10: NO PAIN (0)

## 2025-07-08 NOTE — PROGRESS NOTES
[facility] Dermatology Note  Encounter Date: Jul 8, 2025  Office Visit     Dermatology Problem List:  1. Atopic dermatitis  - Current tx: Dupixent, desonide 0.05% cream bid prn  2. Chronic urticaria (resolved)  - Prior tx: Fexofenadine  ____________________________________________    Assessment & Plan:     # Pruritus  # Atopic Dermatitis  Chronic, stable. No active disease today. Given stability on dupilumab, we will continue this today.  - Continue dupilumab q2 weeks (renewed)    The longitudinal plan of care for the diagnosis(es)/condition(s) as documented were addressed during this visit. Due to the added complexity in care, I will continue to support Janette in the subsequent management and with ongoing continuity of care for atopic dermatitis.      Procedures Performed:   None    Follow-up: 1 year(s) in-person, or earlier for new or changing lesions    Staff and Resident:     Juancho Louis MD  Internal Medicine - Dermatology (PGY-5)   ____________________________________________    CC: No chief complaint on file.    HPI:  Ms. Janetet Anderson is a(n) 84 year old female who presents today as a return patient for atopic dermatitis. Overall doing very well with dupilumab, without any adverse effects. Noted some mild flushing of skin with wine, reassured this is normal physiologic response. Patient is otherwise feeling well, without additional skin concerns.    Labs Reviewed:  N/A    Physical Exam:  Vitals: LMP  (LMP Unknown)   SKIN: Focused examination of extremities was performed.  - No active eczematous patches on exam today  - No other lesions of concern on areas examined.     Medications:  Current Outpatient Medications   Medication Sig Dispense Refill    amLODIPine (NORVASC) 5 MG tablet Take 1 tablet by mouth daily at 2 pm.      amoxicillin (AMOXIL) 500 MG tablet TAKE 4 TABLETS BY MOUTH 1 HOUR BEFORE PROCEDURE      atorvastatin (LIPITOR) 20 MG tablet Take 1 tablet (20 mg) by mouth  daily 90 tablet 4    dupilumab (DUPIXENT) 300 MG/2ML prefilled pen Inject 2 mLs (300 mg) subcutaneously every 14 days. 12 mL 1    fluocinonide (LIDEX) 0.05 % external cream Apply topically 2 times daily 30 g 4    ginkgo biloba 60 MG CAPS capsule Take 60 mg by mouth daily.      glucosamine 500 MG CAPS capsule Take 1,000 mg by mouth daily Sometimes 2-3      hydrochlorothiazide (HYDRODIURIL) 25 MG tablet Take 1 tablet (25 mg) by mouth daily (Patient taking differently: Take 12.5 mg by mouth daily.) 90 tablet 4    ibuprofen (ADVIL/MOTRIN) 200 MG tablet Take 400 mg by mouth every 4 hours as needed for mild pain or moderate pain      losartan (COZAAR) 100 MG tablet Take 1 tablet (100 mg) by mouth daily 90 tablet 4    multivitamin (OCUVITE) TABS tablet Take 1 tablet by mouth daily      Omega-3 Fatty Acids (FISH OIL CONCENTRATE PO) Take 1 capsule by mouth daily.      polyethylene glycol-propylene glycol (SYSTANE ULTRA) 0.4-0.3 % SOLN ophthalmic solution Place 1 drop into both eyes every hour as needed for dry eyes.      spironolactone (ALDACTONE) 25 MG tablet TAKE 1 TABLET(25 MG) BY MOUTH DAILY (Patient taking differently: Take 12.5 mg by mouth daily.) 90 tablet 3    vitamin B-12 (CYANOCOBALAMIN) 2500 MCG sublingual tablet Take 2,500 mcg by mouth daily.       No current facility-administered medications for this visit.      Past Medical History:   Patient Active Problem List   Diagnosis    Essential hypertension, benign    HLD (hyperlipidemia)    Inflamed seborrheic keratosis    Osteopenia     Past Medical History:   Diagnosis Date    HLD (hyperlipidaemia)     HTN (hypertension)     Osteoporosis     Primary osteoarthritis of right knee        CC Fam Hernandez MD  [address] [place] [address] on close of this encounter.

## 2025-07-08 NOTE — NURSING NOTE
Dermatology Rooming Note    Janette Anderson's goals for this visit include:   Chief Complaint   Patient presents with    Derm Problem     Dermatitis- doing well on Dupixent     MILLI Santamaria

## 2025-07-08 NOTE — LETTER
7/8/2025       RE: Janette Anderson  [address]  [place] [address]     Dear Colleague,    Thank you for referring your patient, Janette Anderson, to the [facility] DERMATOLOGY CLINIC [place] at [facility]. Please see a copy of my visit note below.    [facility] Dermatology Note  Encounter Date: Jul 8, 2025  Office Visit     Dermatology Problem List:  1. Atopic dermatitis  - Current tx: Dupixent, desonide 0.05% cream bid prn  2. Chronic urticaria (resolved)  - Prior tx: Fexofenadine  ____________________________________________    Assessment & Plan:     # Pruritus  # Atopic Dermatitis  Chronic, stable. No active disease today. Given stability on dupilumab, we will continue this today.  - Continue dupilumab q2 weeks (renewed)    The longitudinal plan of care for the diagnosis(es)/condition(s) as documented were addressed during this visit. Due to the added complexity in care, I will continue to support Janette in the subsequent management and with ongoing continuity of care for atopic dermatitis.      Procedures Performed:   None    Follow-up: 1 year(s) in-person, or earlier for new or changing lesions    Staff and Resident:     Juancho Louis MD  Internal Medicine - Dermatology (PGY-5)   ____________________________________________    CC: No chief complaint on file.    HPI:  Ms. Janette Anderson is a(n) 84 year old female who presents today as a return patient for atopic dermatitis. Overall doing very well with dupilumab, without any adverse effects. Noted some mild flushing of skin with wine, reassured this is normal physiologic response. Patient is otherwise feeling well, without additional skin concerns.    Labs Reviewed:  N/A    Physical Exam:  Vitals: LMP  (LMP Unknown)   SKIN: Focused examination of extremities was performed.  - No active eczematous patches on exam today  - No other lesions of concern on areas examined.      Medications:  Current Outpatient Medications   Medication Sig Dispense Refill     amLODIPine (NORVASC) 5 MG tablet Take 1 tablet by mouth daily at 2 pm.       amoxicillin (AMOXIL) 500 MG tablet TAKE 4 TABLETS BY MOUTH 1 HOUR BEFORE PROCEDURE       atorvastatin (LIPITOR) 20 MG tablet Take 1 tablet (20 mg) by mouth daily 90 tablet 4     dupilumab (DUPIXENT) 300 MG/2ML prefilled pen Inject 2 mLs (300 mg) subcutaneously every 14 days. 12 mL 1     fluocinonide (LIDEX) 0.05 % external cream Apply topically 2 times daily 30 g 4     ginkgo biloba 60 MG CAPS capsule Take 60 mg by mouth daily.       glucosamine 500 MG CAPS capsule Take 1,000 mg by mouth daily Sometimes 2-3       hydrochlorothiazide (HYDRODIURIL) 25 MG tablet Take 1 tablet (25 mg) by mouth daily (Patient taking differently: Take 12.5 mg by mouth daily.) 90 tablet 4     ibuprofen (ADVIL/MOTRIN) 200 MG tablet Take 400 mg by mouth every 4 hours as needed for mild pain or moderate pain       losartan (COZAAR) 100 MG tablet Take 1 tablet (100 mg) by mouth daily 90 tablet 4     multivitamin (OCUVITE) TABS tablet Take 1 tablet by mouth daily       Omega-3 Fatty Acids (FISH OIL CONCENTRATE PO) Take 1 capsule by mouth daily.       polyethylene glycol-propylene glycol (SYSTANE ULTRA) 0.4-0.3 % SOLN ophthalmic solution Place 1 drop into both eyes every hour as needed for dry eyes.       spironolactone (ALDACTONE) 25 MG tablet TAKE 1 TABLET(25 MG) BY MOUTH DAILY (Patient taking differently: Take 12.5 mg by mouth daily.) 90 tablet 3     vitamin B-12 (CYANOCOBALAMIN) 2500 MCG sublingual tablet Take 2,500 mcg by mouth daily.       No current facility-administered medications for this visit.      Past Medical History:   Patient Active Problem List   Diagnosis     Essential hypertension, benign     HLD (hyperlipidemia)     Inflamed seborrheic keratosis     Osteopenia     Past Medical History:   Diagnosis Date     HLD (hyperlipidaemia)      HTN (hypertension)       Osteoporosis      Primary osteoarthritis of right knee        CC Fam Hernandez MD  [address] [place] [address] on close of this encounter.    Attestation with edits by Fam Hernandez MD at 7/8/2025  2:18 PM:  I have personally examined this patient and agree with the resident doctor's documentation and plan of care. I have reviewed and amended the resident's note. The documentation accurately reflects my clinical observations, diagnoses, treatment and follow-up plans.     Fam Hernandez MD  Dermatology Staff      Again, thank you for allowing me to participate in the care of your patient.      Sincerely,    Fam Hernandez MD

## 2025-07-10 RX ORDER — LOSARTAN POTASSIUM 100 MG/1
100 TABLET ORAL DAILY
Qty: 90 TABLET | Refills: 4 | OUTPATIENT
Start: 2025-07-10

## 2025-07-10 NOTE — TELEPHONE ENCOUNTER
Last Written Prescription:  losartan (COZAAR) 100 MG tablet   90 tablet 4 6/28/2024     ----------------------6-  Future Visit Date: 8-  ----------------------    Refill decision:   [] Medication refilled per  Medication Refill in Ambulatory Care  policy.  [x] Medication unable to be refilled by RN due to: Other:  asked for refill to early,    Request from pharmacy:  Requested Prescriptions   Pending Prescriptions Disp Refills    losartan (COZAAR) 100 MG tablet 90 tablet 4     Sig: Take 1 tablet (100 mg) by mouth daily.       Angiotensin-II Receptors Failed - 7/10/2025 11:28 AM        Failed - Most recent blood pressure under 140/90 in past 12 months     BP Readings from Last 3 Encounters:   07/01/24 (!) 152/75   06/28/24 (!) 163/80   06/06/24 (!) 170/82           Failed - Has GFR on file in past 12 months and most recent value is normal        Failed - Normal serum potassium on file in past 12 months     Recent Labs   Lab Test 06/28/24  0922   POTASSIUM 4.0            Passed - Medication is active on med list and the sig matches. RN to manually verify dose and sig if red X/fail.             Passed - Medication indicated for associated diagnosis             Passed - Recent (12 month) or future (90 days) visit with authorizing provider's specialty (provided they have been seen in the past 15 months)     The patient must have completed an in-person or virtual visit within the past 12 months or has a future visit scheduled within the next 90 days with the authorizing provider s specialty.  Urgent care and e-visits do not qualify as an office visit for this protocol.          Passed - Patient is age 18 or older        Passed - No active pregnancy on record        Passed - No positive pregnancy test in past 12 months

## 2025-07-18 DIAGNOSIS — I10 ESSENTIAL HYPERTENSION, BENIGN: ICD-10-CM

## 2025-07-18 NOTE — TELEPHONE ENCOUNTER
Patient states pharmacy is telling her that they need a new prescription for this medication and patient thought she had one more refill. Pharmacy states the prescription  at the end of . Please send new prescription to pharmacy and call patient to let her know.

## 2025-07-21 ENCOUNTER — MYC REFILL (OUTPATIENT)
Dept: INTERNAL MEDICINE | Facility: CLINIC | Age: 85
End: 2025-07-21
Payer: COMMERCIAL

## 2025-07-21 DIAGNOSIS — I10 ESSENTIAL HYPERTENSION, BENIGN: ICD-10-CM

## 2025-07-21 RX ORDER — LOSARTAN POTASSIUM 100 MG/1
100 TABLET ORAL DAILY
Qty: 90 TABLET | Refills: 4 | Status: SHIPPED | OUTPATIENT
Start: 2025-07-21

## 2025-07-21 NOTE — TELEPHONE ENCOUNTER
Last Written Prescription:   Disp Refills Start End GLADYS   losartan (COZAAR) 100 MG tablet 90 tablet 4 6/28/2024 -- No   Sig - Route: Take 1 tablet (100 mg) by mouth daily - Oral     ----------------------  Last Visit Date:   6/28/2024  [facility] Internal Medicine [place]    Future Visit Date:    8/29/2025 11:30 AM (30 min)  [place]   Arrive by: 11:15 AM   [facility] PHYSICAL   [facility]   Alyssa Lopez MD     ----------------------      Refill decision:   [x] Medication unable to be refilled by RN due to: Overdue labs/test:  BMP, BP    Last Comprehensive Metabolic Panel:  Lab Results   Component Value Date     (L) 06/28/2024    POTASSIUM 4.0 06/28/2024    CHLORIDE 95 (L) 06/28/2024    CO2 26 06/28/2024    ANIONGAP 10 06/28/2024     (H) 06/28/2024    BUN 11.5 06/28/2024    CR 0.60 06/28/2024    GFRESTIMATED 90 06/28/2024    GENESIS 9.9 06/28/2024     BP Readings from Last 3 Encounters:   07/01/24 (!) 152/75   06/28/24 (!) 163/80   06/06/24 (!) 170/82       Request from pharmacy:  Requested Prescriptions   Pending Prescriptions Disp Refills    losartan (COZAAR) 100 MG tablet [Pharmacy Med Name: LOSARTAN 100MG TABLETS] 90 tablet 4     Sig: TAKE 1 TABLET(100 MG) BY MOUTH DAILY       Angiotensin-II Receptors Failed - 7/21/2025  2:12 PM        Failed - Most recent blood pressure under 140/90 in past 12 months     BP Readings from Last 3 Encounters:   07/01/24 (!) 152/75   06/28/24 (!) 163/80   06/06/24 (!) 170/82       No data recorded            Failed - Has GFR on file in past 12 months and most recent value is normal        Failed - Normal serum potassium on file in past 12 months     Recent Labs   Lab Test 06/28/24  0922   POTASSIUM 4.0                    Passed - Medication is active on med list and the sig matches. RN to manually verify dose and sig if red X/fail.     If the protocol passes (green check), you do not need to verify med dose and sig.    A prescription matches if they  are the same clinical intention.    For Example: once daily and every morning are the same.    The protocol can not identify upper and lower case letters as matching and will fail.     For Example: Take 1 tablet (50 mg) by mouth daily     TAKE 1 TABLET (50 MG) BY MOUTH DAILY    For all fails (red x), verify dose and sig.    If the refill does match what is on file, the RN can still proceed to approve the refill request.       If they do not match, route to the appropriate provider.             Passed - Medication indicated for associated diagnosis     The medication is prescribed for one or more of the following conditions:    Chronic Kidney Disease (CDK)    Heart Failure (HF)    Diabetes, Nephropathy   Hypertension    Coronary Artery Disease (CAD)   Raynaud's Disease   Ischemic cardiomyopathy   Cardiomyopathy   Pulmonary Hypertension          Passed - Recent (12 month) or future (90 days) visit with authorizing provider's specialty (provided they have been seen in the past 15 months)     The patient must have completed an in-person or virtual visit within the past 12 months or has a future visit scheduled within the next 90 days with the authorizing provider s specialty.  Urgent care and e-visits do not qualify as an office visit for this protocol.          Passed - Patient is age 18 or older        Passed - No active pregnancy on record        Passed - No positive pregnancy test in past 12 months

## 2025-07-23 RX ORDER — LOSARTAN POTASSIUM 100 MG/1
100 TABLET ORAL DAILY
Qty: 90 TABLET | Refills: 4 | OUTPATIENT
Start: 2025-07-23

## 2025-07-28 ENCOUNTER — MYC REFILL (OUTPATIENT)
Dept: INTERNAL MEDICINE | Facility: CLINIC | Age: 85
End: 2025-07-28
Payer: COMMERCIAL

## 2025-07-28 DIAGNOSIS — I10 ESSENTIAL HYPERTENSION, BENIGN: Primary | ICD-10-CM

## 2025-07-29 ENCOUNTER — TELEPHONE (OUTPATIENT)
Dept: DERMATOLOGY | Facility: CLINIC | Age: 85
End: 2025-07-29
Payer: COMMERCIAL

## 2025-07-29 NOTE — TELEPHONE ENCOUNTER
7/29 Patient confirmed scheduled appointment:  Date: 7/9/26  Time: 12:15pm  Visit type: Return Dermatology  Provider: David  Location: [facility]  Testing/imaging: na  Additional notes: na

## 2025-07-30 RX ORDER — AMLODIPINE BESYLATE 5 MG/1
5 TABLET ORAL
Qty: 90 TABLET | Refills: 4 | Status: SHIPPED | OUTPATIENT
Start: 2025-07-30

## 2025-07-30 NOTE — TELEPHONE ENCOUNTER
Sent [employer] message to patient informing her Dr. Lopez has not previously prescribed this medication.  Medication is historical - Last prescribed by VICKY RAUSCH per outside med tab.     Donna Mackey RN on 7/30/2025 at 12:10 PM

## 2025-07-30 NOTE — TELEPHONE ENCOUNTER
Last Written Prescription:  amLODIPine (NORVASC) 5 MG tablet -- -- 5/16/2025 -- --   Sig - Route: Take 1 tablet by mouth daily at 2 pm. - Oral   Class: Historical     ----------------------  Last Visit Date: 06/28/2024 Office Visit [facility]   Future Visit Date: 8/29/25  ----------------------      Refill decision:   [] Medication refilled per  Medication Refill in Ambulatory Care  policy.  [x] Medication unable to be refilled by RN due to: Overdue labs/test:   and Medication - Last script is Reported/Historical/Transitional  Calcium Channel Blockers Protocol  [id number]/28/2025 10:34 AM   Protocol Details Most recent blood pressure under 140/90 in past 12 months    Medication is active on med list and the sig matches. RN to manually verify dose and sig if red X/fail.    Medication is indicated for associated diagnosis     BP Readings from Last 3 Encounters:   07/01/24 (!) 152/75   06/28/24 (!) 163/80   06/06/24 (!) 170/82         Request from pharmacy:  Requested Prescriptions   Pending Prescriptions Disp Refills    amLODIPine (NORVASC) 5 MG tablet       Sig: Take 1 tablet (5 mg) by mouth daily at 2 pm.       Calcium Channel Blockers Protocol  Failed - 7/30/2025 11:32 AM        Failed - Most recent blood pressure under 140/90 in past 12 months     BP Readings from Last 3 Encounters:   07/01/24 (!) 152/75   06/28/24 (!) 163/80   06/06/24 (!) 170/82       No data recorded            Failed - Medication is active on med list and the sig matches. RN to manually verify dose and sig if red X/fail.     If the protocol passes (green check), you do not need to verify med dose and sig.    A prescription matches if they are the same clinical intention.    For Example: once daily and every morning are the same.    The protocol can not identify upper and lower case letters as matching and will fail.     For Example: Take 1 tablet (50 mg) by mouth daily     TAKE 1 TABLET (50 MG) BY MOUTH DAILY    For all fails (red x),  verify dose and sig.    If the refill does match what is on file, the RN can still proceed to approve the refill request.       If they do not match, route to the appropriate provider.             Failed - Medication is indicated for associated diagnosis        Passed - Recent (12 month) or future (90 days) visit with authorizing provider's specialty (provided they have been seen in the past 15 months)     The patient must have completed an in-person or virtual visit within the past 12 months or has a future visit scheduled within the next 90 days with the authorizing provider s specialty.  Urgent care and e-visits do not qualify as an office visit for this protocol.          Passed - Patient is age 18 or older        Passed - No active pregnancy on record        Passed - No positive pregnancy test in past 12 months           Mckenzie B, RN  [facility]/Red Flag Triage & Med Refill Team

## 2025-08-03 ENCOUNTER — HEALTH MAINTENANCE LETTER (OUTPATIENT)
Age: 85
End: 2025-08-03

## 2025-08-29 PROCEDURE — 82607 VITAMIN B-12: CPT | Performed by: INTERNAL MEDICINE

## 2025-08-29 PROCEDURE — 99000 SPECIMEN HANDLING OFFICE-LAB: CPT | Performed by: PATHOLOGY
